# Patient Record
Sex: FEMALE | Race: WHITE | NOT HISPANIC OR LATINO | Employment: OTHER | URBAN - METROPOLITAN AREA
[De-identification: names, ages, dates, MRNs, and addresses within clinical notes are randomized per-mention and may not be internally consistent; named-entity substitution may affect disease eponyms.]

---

## 2017-11-29 ENCOUNTER — CONVERSION ENCOUNTER (OUTPATIENT)
Dept: MAMMOGRAPHY | Facility: HOSPITAL | Age: 77
End: 2017-11-29

## 2018-01-24 ENCOUNTER — OFFICE (OUTPATIENT)
Dept: URBAN - METROPOLITAN AREA CLINIC 66 | Facility: CLINIC | Age: 78
End: 2018-01-24
Payer: COMMERCIAL

## 2018-01-24 VITALS
WEIGHT: 5 LBS | DIASTOLIC BLOOD PRESSURE: 77 MMHG | HEIGHT: 72 IN | SYSTOLIC BLOOD PRESSURE: 117 MMHG | HEART RATE: 74 BPM | TEMPERATURE: 97.7 F

## 2018-01-24 DIAGNOSIS — K59.00 CONSTIPATION, UNSPECIFIED: ICD-10-CM

## 2018-01-24 DIAGNOSIS — R11.0 NAUSEA: ICD-10-CM

## 2018-01-24 PROCEDURE — 99214 OFFICE O/P EST MOD 30 MIN: CPT

## 2018-01-24 RX ORDER — ONDANSETRON 4 MG/1
12 TABLET, ORALLY DISINTEGRATING ORAL
Qty: 20 | Refills: 0 | Status: ACTIVE
Start: 2018-01-24

## 2018-01-29 ENCOUNTER — OFFICE (OUTPATIENT)
Dept: URBAN - METROPOLITAN AREA PATHOLOGY 4 | Facility: PATHOLOGY | Age: 78
End: 2018-01-29
Payer: COMMERCIAL

## 2018-01-29 ENCOUNTER — AMBULATORY SURGICAL CENTER (OUTPATIENT)
Dept: URBAN - METROPOLITAN AREA SURGERY 20 | Facility: SURGERY | Age: 78
End: 2018-01-29
Payer: COMMERCIAL

## 2018-01-29 DIAGNOSIS — R19.7 DIARRHEA, UNSPECIFIED: ICD-10-CM

## 2018-01-29 DIAGNOSIS — K64.1 SECOND DEGREE HEMORRHOIDS: ICD-10-CM

## 2018-01-29 DIAGNOSIS — D12.0 BENIGN NEOPLASM OF CECUM: ICD-10-CM

## 2018-01-29 DIAGNOSIS — K63.89 OTHER SPECIFIED DISEASES OF INTESTINE: ICD-10-CM

## 2018-01-29 PROCEDURE — 88305 TISSUE EXAM BY PATHOLOGIST: CPT

## 2018-01-29 PROCEDURE — 45380 COLONOSCOPY AND BIOPSY: CPT

## 2019-03-18 ENCOUNTER — HOSPITAL ENCOUNTER (OUTPATIENT)
Dept: MAMMOGRAPHY | Facility: HOSPITAL | Age: 79
Discharge: HOME | End: 2019-03-18
Attending: OBSTETRICS & GYNECOLOGY

## 2019-03-26 ENCOUNTER — HOSPITAL ENCOUNTER (OUTPATIENT)
Dept: ULTRASOUND IMAGING | Facility: HOSPITAL | Age: 79
Discharge: HOME | End: 2019-03-26
Attending: OBSTETRICS & GYNECOLOGY

## 2019-04-30 ENCOUNTER — HOSPITAL ENCOUNTER (OUTPATIENT)
Dept: NUCLEAR MEDICINE | Facility: HOSPITAL | Age: 79
Discharge: HOME | End: 2019-04-30

## 2020-06-10 ENCOUNTER — OFFICE VISIT CONVERTED (OUTPATIENT)
Dept: OTOLARYNGOLOGY | Facility: CLINIC | Age: 80
End: 2020-06-10
Attending: OTOLARYNGOLOGY

## 2020-06-10 ENCOUNTER — CONVERSION ENCOUNTER (OUTPATIENT)
Dept: OTOLARYNGOLOGY | Facility: CLINIC | Age: 80
End: 2020-06-10

## 2020-07-18 ENCOUNTER — HOSPITAL ENCOUNTER (OUTPATIENT)
Dept: MAMMOGRAPHY | Facility: HOSPITAL | Age: 80
Discharge: HOME | End: 2020-07-18
Attending: OBSTETRICS & GYNECOLOGY

## 2020-07-23 ENCOUNTER — HOSPITAL ENCOUNTER (OUTPATIENT)
Dept: MAMMOGRAPHY | Facility: HOSPITAL | Age: 80
Discharge: HOME | End: 2020-07-23
Attending: OBSTETRICS & GYNECOLOGY

## 2021-03-15 ENCOUNTER — OFFICE VISIT CONVERTED (OUTPATIENT)
Dept: PULMONOLOGY | Facility: CLINIC | Age: 81
End: 2021-03-15
Attending: INTERNAL MEDICINE

## 2021-03-17 ENCOUNTER — HOSPITAL ENCOUNTER (OUTPATIENT)
Dept: SLEEP MEDICINE | Facility: HOSPITAL | Age: 81
Discharge: HOME | End: 2021-03-17
Attending: PSYCHIATRY & NEUROLOGY

## 2021-03-29 ENCOUNTER — HOSPITAL ENCOUNTER (OUTPATIENT)
Dept: PET IMAGING | Facility: HOSPITAL | Age: 81
Discharge: HOME | End: 2021-03-29
Attending: INTERNAL MEDICINE

## 2021-04-05 ENCOUNTER — HOSPITAL ENCOUNTER (OUTPATIENT)
Dept: SLEEP MEDICINE | Facility: HOSPITAL | Age: 81
Discharge: HOME | End: 2021-04-05
Attending: PSYCHIATRY & NEUROLOGY

## 2021-05-10 NOTE — H&P
"   History and Physical      Patient Name: Kathryn More   Patient ID: [id number]   Sex: Female   YOB: 1940    Primary Care Provider: Ren Calhoun MD   Referring Provider: Ren Calhoun MD    Visit Date: Shirley 10, 2020    Provider: Victor Manuel Cisse MD   Location: [facility]   Location Address: [address] [place] [address], [address]  [id number]   Location Phone: [phone]          Chief Complaint     \"Dr. Weinberg retired.\"       History Of Present Illness  Kathryn More is a 80 year old /White female with past medical history significant for hyperparathyroidism, hypertension, hyperlipidemia, and GERD who presents to the office today as a consult from Ren Calhoun MD for evaluation of hyperparathyroidism. She tells me that she has been seeing an endocrinologist, Dr. Weinberg, for a number of years. She tells me that her calcium was initially noted to be elevated on routine laboratory evaluation. She has been getting this followed since. Her most recent testing occurred on 1/17/2020 and revealed an intact PTH of 80 with a calcium of 10.6 and a creatinine of 0.92. She has previously undergone 24-hour urine calcium on 1/1/2017 which was 296. She denies any prior issues with her vitamin D level. She is not taking any thiazide diuretics. She denies any issues with hip or vertebral fractures. She does report a history of kidney stones. She denies any issues with fatigue, brain fog, or constipation. She does report a history of thyroid nodules but denies any family history of thyroid cancer or personal history of radiation exposure. She was last seen by Dr. Farley on 12/17/2019 at which time they continue to pursue observation. The record shows that she underwent a DEXA scan in February 2019 which demonstrated worsening osteopenia. I do not have those results available to me today.       Past Medical History  Cataract; Chest Pain; Coronary artery disease; Edema; GERD; High " cholesterol; Hypertension; Kidney stone; Macular degeneration; Parathyroid abnormality; Skin cancer; Sleep apnea; Vertigo         Past Surgical History  Breast biopsy, both breasts; Cholecystecomy; Cryosurgery of lesion; Dilatation and curettage; Skin Cancer Excision         Medication List  Aspirin Low Dose 81 mg oral tablet,delayed release (DR/EC); Crestor 5 mg oral tablet; Fioricet -40 mg oral capsule; magnesium oxide 400 mg magnesium oral tablet; meclizine 25 mg oral tablet; nitroglycerin 50 mg/10 mL (5 mg/mL) intravenous solution; omeprazole 20 mg oral capsule,delayed release(DR/EC); PreserVision AREDS 14,320-226-200 unit-mg-unit oral capsule; Probiotic 15 billion cell oral capsule; sertraline 50 mg oral tablet; spironolactone 25 mg oral tablet; tramadol 50 mg oral tablet; valsartan 320 mg oral tablet         Allergy List  Biaxin; Doxazosin-induced syncope; epinephrine; Lidocaine         Family Medical History  Family history of skin cancer; Family history of brain cancer; Family history of stroke; Family history of heart disease; Family history of throat cancer         Social History  Tobacco (Never)         Review of Systems  · Constitutional  o Denies  o : fever, night sweats, weight loss  · Eyes  o Admits  o : discharge from eye, impaired vision  · HENT  o Admits  o : *See HPI  · Cardiovascular  o Denies  o : chest pain, irregular heart beats  · Respiratory  o Admits  o : shortness of breath  o Denies  o : wheezing, coughing up blood  · Gastrointestinal  o Admits  o : reflux  o Denies  o : heartburn, vomiting blood  · Genitourinary  o Denies  o : frequency  · Integument  o Admits  o : skin dryness  o Denies  o : rash  · Neurologic  o Admits  o : loss of balance  o Denies  o : seizures, loss of consciousness, dizziness  · Endocrine  o Denies  o : cold intolerance, heat intolerance  · Heme-Lymph  o Denies  o : easy bleeding, anemia      Vitals  Date Time BP Position Site L\R Cuff Size HR RR TEMP (F) WT   "HT  BMI kg/m2 BSA m2 O2 Sat        06/10/2020 10:02 AM        97.5 166lbs 6oz 5'  2\" 30.43 1.82           Physical Examination  · Constitutional  o Appearance  o : well developed, well-nourished, alert and in no acute distress, voice clear and strong  · Head and Face  o Head  o :   § Inspection  § : no deformities or lesions  o Face  o :   § Inspection  § : No facial lesions; House-Brackmann I/VI bilaterally  § Palpation  § : No TMJ crepitus nor  muscle tenderness bilaterally  · Eyes  o Vision  o :   § Visual Fields  § : Extraocular movements are intact. No spontaneous or gaze-induced nystagmus.  o Conjunctivae  o : clear  o Sclerae  o : clear  o Pupils and Irises  o : pupils equal, round, and reactive to light.   · Ears, Nose, Mouth and Throat  o Ears  o :   § External Ears  § : appearance within normal limits, no lesions present  § Otoscopic Examination  § : tympanic membrane appearance within normal limits bilaterally without perforations, well-aerated middle ears  § Hearing  § : intact to conversational voice both ears  o Nose  o :   § External Nose  § : appearance normal  § Intranasal Exam  § : mucosa within normal limits, vestibules normal, no intranasal lesions present, septum midline, sinuses non tender to percussion  o Oral Cavity  o :   § Oral Mucosa  § : oral mucosa normal without pallor or cyanosis  § Lips  § : lip appearance normal  § Teeth  § : normal dentition for age  § Gums  § : gums pink, non-swollen, no bleeding present  § Tongue  § : tongue appearance normal; normal mobility  § Palate  § : hard palate normal, soft palate appearance normal with symmetric mobility  o Throat  o :   § Oropharynx  § : no inflammation or lesions present, tonsils within normal limits  § Hypopharynx  § : Deferred secondary to gag reflex  § Larynx  § : Deferred secondary to gag reflex  · Neck  o Inspection/Palpation  o : normal appearance, no masses or tenderness, trachea midline; thyroid size normal, " nontender, no nodules or masses present on palpation  · Respiratory  o Respiratory Effort  o : breathing unlabored  o Inspection of Chest  o : normal appearance, no retractions  · Cardiovascular  o Heart  o : regular rate and rhythm  · Lymphatic  o Neck  o : no lymphadenopathy present  o Supraclavicular Nodes  o : no lymphadenopathy present  o Preauricular Nodes  o : no lymphadenopathy present  · Skin and Subcutaneous Tissue  o General Inspection  o : Regarding face and neck - there are no rashes present, no lesions present, and no areas of discoloration  · Neurologic  o Cranial Nerves  o : cranial nerves II-XII are grossly intact bilaterally  o Gait and Station  o : normal gait, able to stand without diffculty  · Psychiatric  o Judgement and Insight  o : judgment and insight intact  o Mood and Affect  o : mood normal, affect appropriate              Assessment  · Hypercalcemia     275.42/E83.52  · Hyperparathyroidism     252.00/E21.3    Problems Reconciled  Plan  · Orders  o PTH intact + calcium (12507, 22241) - 275.42/E83.52, 252.00/E21.3 - 01/19/2021  · Medications  o Medications have been Reconciled  o Transition of Care or Provider Policy  · Instructions  o Impressions and findings were discussed with Mrs. More at great length. Currently, she is seen for evaluation of hyperparathyroidism and hypercalcemia. We reviewed her most recent laboratory evaluation from 1/17/2020 which revealed an elevated intact PTH at 80 and calcium at 10.6. Her creatinine was normal at 0.92. We discussed that this is likely consistent with primary hyperparathyroidism. Currently, she is asymptomatic aside from rare kidney stones although her most recent DEXA in February 2019 demonstrated worsening of her osteopenia. We discussed the pathophysiology and natural history of this condition. Options for management were discussed including continued observation versus parathyroidectomy. At this time, we will continue with observation and  will therefore see her back in 6 months with a repeat PTH and calcium.  · Correspondence  o ENT Letter to Referring MD (Ren Calhoun MD) - 06/19/2020            Electronically Signed by: Victor Manuel Cisse MD -Author on June 19, 2020 08:47:01 AM

## 2021-05-15 VITALS — TEMPERATURE: 97.5 F | WEIGHT: 166.37 LBS | HEIGHT: 62 IN | BODY MASS INDEX: 30.61 KG/M2

## 2021-05-17 ENCOUNTER — OFFICE VISIT (OUTPATIENT)
Dept: ENDOCRINOLOGY | Age: 81
End: 2021-05-17

## 2021-05-17 VITALS
HEIGHT: 64 IN | HEART RATE: 68 BPM | BODY MASS INDEX: 28.89 KG/M2 | WEIGHT: 169.2 LBS | DIASTOLIC BLOOD PRESSURE: 80 MMHG | OXYGEN SATURATION: 97 % | SYSTOLIC BLOOD PRESSURE: 142 MMHG

## 2021-05-17 DIAGNOSIS — E21.3 HYPERPARATHYROIDISM (HCC): Primary | ICD-10-CM

## 2021-05-17 DIAGNOSIS — M85.89 OSTEOPENIA OF MULTIPLE SITES: ICD-10-CM

## 2021-05-17 PROCEDURE — 99204 OFFICE O/P NEW MOD 45 MIN: CPT | Performed by: INTERNAL MEDICINE

## 2021-05-17 NOTE — PROGRESS NOTES
"Chief Complaint  Chief Complaint   Patient presents with   • hyperparathryroidism       Subjective          History of Present Illness    Kathryn More,81 y.o.  is here for elevated Ca levels. Consulted by Dr. Calhoun    Patient used to see Dr. Weinberg in the past, she was diagnosed with calcium and parathyroid abnormality 23 years ago.    Pt ca was noted to be elevated on routine blood work up with ?  Elevated PTH levels.   No hx of kidney stones, no hx of fragility fractures, does have hx of osteopenia. No family hx of kidney stones and no family hx of osteoporosis.   Not on Ca and Vitamin D supplementation.  Does take multivitamin  No c/o occasional abdominal pain, does c/o bone pains and joint pain, no increased urination and no increased thirst.   Pt is not on HCTZ . Currently on no steroids. Drinks milk twice a week, eats cheese daily and doesn't eat yogurt.   Does not take regular Tums.    No DEXA scan in the system and no 24 hr urine calcium levels.     Thyroid ultrasound-showed multiple tiny subcentimeter nodules.    Reviewed primary care physician's/consulting physician documentation and lab results         I have reviewed the patient's allergies, medicines, past medical hx, family hx and social hx in detail.    Objective   Vital Signs:   /80 (BP Location: Left arm, Patient Position: Sitting, Cuff Size: Large Adult)   Pulse 68   Ht 161.9 cm (63.74\")   Wt 76.7 kg (169 lb 3.2 oz)   SpO2 97%   BMI 29.28 kg/m²   Physical Exam   General appearance - no distress  Eyes- anicteric sclera  Ear nose and throat-external ears and nose normal.    Respiratory-normal chest on inspection.  No respiratory distress noted.  Skin-no rashes.  Neuro-alert and oriented x3          Result Review :   The following data was reviewed by: Herve Srivastava MD on 05/17/2021:  No results found for any previous visit.     Data reviewed: Referral documentation and outside lab reports       Results Review:    I reviewed the " patient's new clinical results.     Assessment and Plan {CC Problem List  Visit Diagnosis  ROS  Review (Popup)  Health Maintenance  Quality  BestPractice  Medications  SmartSets  SnapShot Encounters  Media :23}   Problem List Items Addressed This Visit     None      Visit Diagnoses     Hyperparathyroidism (CMS/[facility])    -  Primary    Relevant Orders    DEXA Bone Density Axial    TSH    T4, Free    Basic Metabolic Panel    PTH, Intact & Calcium    Protein Elec + Interp, Serum    Phosphorus    Vitamin D 25 Hydroxy    Magnesium    Osteopenia of multiple sites        Relevant Orders    DEXA Bone Density Axial    TSH    T4, Free    Basic Metabolic Panel    PTH, Intact & Calcium    Protein Elec + Interp, Serum    Phosphorus    Vitamin D 25 Hydroxy    Magnesium        Primary hyperparathyroidism  Patient is not very comfortable with parathyroid surgery.  She does not even want to consider bone medications given the risk of the side effects.    Proceed with a bone density scan.  Check calcium, parathyroid levels.    Based on these findings would either continue to monitor her levels or might consider oral bisphosphonates to help improve the calcium levels.    Explained to the patient that the surgery is not aggressive but patient at this time does not want to proceed with that plan.    Osteopenia  Proceed with a bone density scan.    Reviewed the ultrasound report of the patient for the thyroid-was noted to have subcentimeter thyroid nodules.    Interpreted the blood work-up/imaging results performed by the primary care/consulting physician -    Refills sent to pharmacy    Follow Up     Patient was given instructions and counseling regarding her condition or for health maintenance advice. Please see specific information pulled into the AVS if appropriate.       Thank you for asking me to see your patient, Kathryn More in consultation.         Herve Srivastava MD  05/17/21      EMR Dragon / transcription  "disclaimer:     \"Dictated utilizing Dragon dictation\".                     "

## 2021-05-20 LAB
25(OH)D3+25(OH)D2 SERPL-MCNC: 42.8 NG/ML (ref 30–100)
ALBUMIN SERPL ELPH-MCNC: 3.7 G/DL (ref 2.9–4.4)
ALBUMIN/GLOB SERPL: 1.2 {RATIO} (ref 0.7–1.7)
ALPHA1 GLOB SERPL ELPH-MCNC: 0.3 G/DL (ref 0–0.4)
ALPHA2 GLOB SERPL ELPH-MCNC: 0.9 G/DL (ref 0.4–1)
B-GLOBULIN SERPL ELPH-MCNC: 1.2 G/DL (ref 0.7–1.3)
BUN SERPL-MCNC: 20 MG/DL (ref 8–23)
BUN/CREAT SERPL: 20.8 (ref 7–25)
CALCIUM SERPL-MCNC: 11.1 MG/DL (ref 8.6–10.5)
CHLORIDE SERPL-SCNC: 105 MMOL/L (ref 98–107)
CO2 SERPL-SCNC: 24.8 MMOL/L (ref 22–29)
CREAT SERPL-MCNC: 0.96 MG/DL (ref 0.57–1)
GAMMA GLOB SERPL ELPH-MCNC: 0.6 G/DL (ref 0.4–1.8)
GLOBULIN SER CALC-MCNC: 3 G/DL (ref 2.2–3.9)
GLUCOSE SERPL-MCNC: 91 MG/DL (ref 65–99)
INTACT PTH: ABNORMAL
LABORATORY COMMENT REPORT: NORMAL
M PROTEIN SERPL ELPH-MCNC: NORMAL G/DL
MAGNESIUM SERPL-MCNC: 2.1 MG/DL (ref 1.6–2.4)
PHOSPHATE SERPL-MCNC: 3.5 MG/DL (ref 2.5–4.5)
POTASSIUM SERPL-SCNC: 5.1 MMOL/L (ref 3.5–5.2)
PROT PATTERN SERPL ELPH-IMP: NORMAL
PROT SERPL-MCNC: 6.7 G/DL (ref 6–8.5)
PTH-INTACT SERPL-MCNC: 78 PG/ML (ref 15–65)
SODIUM SERPL-SCNC: 141 MMOL/L (ref 136–145)
T4 FREE SERPL-MCNC: 1.03 NG/DL (ref 0.93–1.7)
TSH SERPL DL<=0.005 MIU/L-ACNC: 1.73 UIU/ML (ref 0.27–4.2)

## 2021-05-21 ENCOUNTER — TELEPHONE (OUTPATIENT)
Dept: ENDOCRINOLOGY | Age: 81
End: 2021-05-21

## 2021-05-21 DIAGNOSIS — E21.3 HYPERPARATHYROIDISM (HCC): Primary | ICD-10-CM

## 2021-05-22 ENCOUNTER — TRANSCRIBE ORDERS (OUTPATIENT)
Dept: ADMINISTRATIVE | Facility: HOSPITAL | Age: 81
End: 2021-05-22

## 2021-05-22 DIAGNOSIS — R91.1 PULMONARY NODULE: Primary | ICD-10-CM

## 2021-05-28 VITALS
WEIGHT: 166.5 LBS | DIASTOLIC BLOOD PRESSURE: 68 MMHG | SYSTOLIC BLOOD PRESSURE: 145 MMHG | BODY MASS INDEX: 29.5 KG/M2 | HEART RATE: 71 BPM | OXYGEN SATURATION: 97 % | TEMPERATURE: 97 F | RESPIRATION RATE: 16 BRPM | HEIGHT: 63 IN

## 2021-05-28 NOTE — PROGRESS NOTES
Patient: MIRLANDE ECHOLS     Acct: [id number]     Report: #[id number]  UNIT #: [id number]     : 1940    Encounter Date:03/15/2021  PRIMARY CARE: BRIANA GAINES  ***Signed***  --------------------------------------------------------------------------------------------------------------------  Chief Complaint      Encounter Date      Mar 15, 2021            Primary Care Provider      BRIANA GAINES            Referring Provider      BRIANA GAINES            Patient Complaint      Patient is complaining of      New Pt here for 1cm RLL. Hypertension            VITALS      Height 5 ft 3 in / 160.02 cm      Weight 166 lbs 8 oz / 75.799065 kg      BSA 1.79 m2      BMI 29.5 kg/m2      Temperature 97.0 F / 36.11 C - Tympanic      Pulse 71      Respirations 16      Blood Pressure 145/68 Sitting, Left Arm      Pulse Oximetry 97%, room air      Initial Exhaled Nitrous Oxide      Date:  Mar 15, 2021      Exhaled Nitrous Oxide Results:  17            HPI      The patient is a pleasant 80 year old female here today to be evaluated for an     abnormal CT scan of the chest.  The patient had been having some cough now which    has been present over the course of the past several months. The patient     subsequently underwent a chest CT scan to further assess the cough. CT scan was     performed on 2021. CT scan showed the presence of a 1 cm spiculated lesion    in the right lower lobe recommending a PET scan for follow up.  The patient's     course is worse at night. It is associated with acid reflux and is not     associated with any sputum production.  No other aggravating factors or     relieving factors, severe at times, very sparsely present during the day. No     chest pains, heart palpitations or lower extremity edema. Denies any swollen or     enlarged lymph nodes and no weight loss. She denies having any hemoptysis.  The     patient has no significant smoking history.            ROS       Constitutional:  Complains of: Fatigue; Denies: Fever, Weight gain, Weight loss,    Chills, Insomnia, Other      Respiratory/Breathing:  Complains of: Shortness of air, Wheezing, Cough; Denies:    Hemoptysis, Pleuritic pain, Other      Endocrine:  Denies: Polydipsia, Polyuria, Heat/cold intolerance, Abnorml me    nstrual pattern, Diabetes, Other      Eyes:  Denies: Blurred vision, Vision Changes, Other      Ears, nose, mouth, throat:  Complains of: Headaches, Dry Mouth, Nasal discharge,    Nasal congestion; Denies: Mouth lesions, Thrush, Throat pain, Hoarseness,     Allergies/Hay Fever, Post Nasal Drip, Recent Head Injury, Nose Bleeding, Neck     Stiffness, Thyroid Mass, Hearing Loss, Ear Fullness, Nasal or Sinus Pain, Dry     Lips, Other      Cardiovascular:  Complains of: Chest Pain, Dyspnea on Exertion, Other (Coronary     heart disease); Denies: Palpitations, Syncope, Claudication, Wake up Gasping for    air, Leg Swelling, Irregular Heart Rate, Cyanosis      Gastrointestinal:  Complains of: Reflux/Heartburn; Denies: Nausea, Constipation,    Diarrhea, Abdominal pain, Vomiting, Difficulty Swallowing, Dysphagia, Jaundice,     Bloating, Melena, Bloody stools, Other      Genitourinary:  Denies: Urinary frequency, Incontinence, Hematuria, Urgency,     Nocturia, Dysuria, Testicular problems, Other      Musculoskeletal:  Complains of: Joint Pain, Joint Stiffness; Denies: Joint     Swelling, Myalgias, Other      Hematologic/lymphatic:  DENIES: Lymphadenopathy, Bruising, Bleeding tendencies,     Other      Neurological:  Complains of: Weakness; Denies: Headache, Numbness, Seizures,     Other      Psychiatric:  Denies: Anxiety, Appropriate Effect, Depression, Other      Sleep:  Yes: Other (Sleep apnea); No: Excessive daytime sleep, Morning     Headache?, Snoring, Insomnia?, Stop breathing at sleep?      Integumentary:  Complains of: Dry skin; Denies: Rash, Skin Warm to Touch, Other      Immunologic/Allergic:  Denies:  "Latex allergy, Seasonal allergies, Asthma,     Urticaria, Eczema, Other      Immunization status:  Up to date            FAMILY/SOCIAL/MEDICAL HX      Surgical History:  Yes: Breast Surgery (Jonatan braest biopsy), Cholecystectomy, Eye    Surgery (Bilateral Cataract), Other Surgeries (DNC x 2,,, skin cancer removed     off face)      Stroke - Family Hx:  Mother      Heart - Family Hx:  Father      Cancer/Type - Family Hx:  Mother (skin cancer), Father (throat cancer), Brother     (Brain cancer)      Is Father Still Living?:  No      Is Mother Still Living?:  No      Social History:  No Tobacco Use, No Alcohol Use, No Recreational Drug use      Smoking status:  Never smoker       Section:  No      Tubal Ligation:  No      Hysterectomy:  No      Anticoagulation Therapy:  No      Antibiotic Prophylaxis:  Yes      Medical History:  Yes: Chemotherapy/Cancer (SKIN CANCER REMOVED),     Hemorrhoids/Rectal Prob (REFLUX), High Blood Pressure (CONTROLLED WITH MEDS ),     High Cholesterol, Reflux Disease, Shortness Of Breath, Thyroid Problem     (hyperparathyroidism); No: Blood Disease, Deafness or Ringing Ears,     Miscellaneous Medical/oth      Psychiatric History      None            PREVENTION      Hx Influenza Vaccination:  Yes      Date Influenza Vaccine Given:  2020      Influenza Vaccine Declined:  No      2 or More Falls in Past Year?:  No      Fall Past Year with Injury?:  Yes (right shoulder)      Hx Pneumococcal Vaccination:  Yes      Encouraged to follow-up with:  PCP regarding preventative exams.      Chart initiated by      Theresa Prado MA            ALLERGIES/MEDICATIONS      Allergies:        Coded Allergies:             EPINEPHRINE (Verified  Allergy, Unknown, \"MAKES MY HEART FEEL FUNNY,     SENSATION OF PASSING OUT\", 3/15/21)           CLARITHROMYCIN (Verified  Adverse Reaction, Unknown, UPSET STOMACH,     3/15/21)      Medications      Gluc Bautista/Chondro Bautista A/Vit C/Mn (Osteo Biflex) 1 Each Tablet    "   1 TAB PO QDAY, #30 TAB         Reported         3/15/21       Ubidecarenone (Co Q-10) 1 Each Capsule      100 MG PO QDAY, CAP         Reported         3/15/21       Lactobac Cmb #3/Fos/Pantethine (Probiotic   1 EACH PO, CAP         Reported         3/15/21       (eylea)   No Conflict Check               Reported         3/15/21       Nitroglycerin (NITROSTAT) 0.4 Mg Tablet      0.4 MG SL ASDIR, #25 TAB.SL         Reported         3/15/21       traMADol HCl (traMADol HCl) 50 Mg Tablet      50 MG PO Q4H PRN for PAIN, TAB 0 Refills         Reported         3/15/21       Meclizine Hcl (Meclizine*) 25 Mg Tablet      25 MG PO prn, #60 TAB 0 Refills         Reported         3/15/21       (persvision areds)   No Conflict Check               Reported         3/15/21       Ofloxacin (Ofloxacin Op Drops) 5 Ml Drops      1 DROPS EYE LT ASDIR, BOTTLE         Reported         11/4/16       prednisoLONE Acetate (Pred-Forte 1% Ophth) 10 Ml Drops.susp      1 DROPS EYE EACH ASDIR, #1 BOTTLE 0 Refills         Reported         11/4/16       Magnesium Oxide (Magnesium Oxide*) 400 Mg Tablet      400 MG PO BID, #60 TAB 0 Refills         Reported         11/4/16       Multivitamins (Multi-Vitamin) 1 Each Tablet      1 TAB PO QDAY, #30 TAB 0 Refills         Reported         11/4/16       Omeprazole (priLOSEC) 20 Mg Capsule.dr      20 MG PO Q2D, #60 CAP 0 Refills         Reported         11/4/16       Sertraline HCl (Sertraline*) 50 Mg Tablet      50 MG PO HS, #30 TAB 0 Refills         Reported         11/4/16       Rosuvastatin Calcium (Crestor*) 5 Mg Tablet      5 MG PO Q2D, #30 TAB 0 Refills         Reported         11/4/16       Spironolactone (Spironolactone*) 25 Mg Tablet      25 MG PO HS, #60 TAB 0 Refills         Reported         11/4/16       Aspirin Chew (Aspirin Chew) 81 Mg Tab.chew      81 MG PO QDAY, #30 TAB.CHEW 0 Refills         Reported         11/4/16       Valsartan (Diovan) 160 Mg Tablet      240 MG PO HS, TAB          Reported         11/4/16       Butalbital/APAP/Caffeine 50/325/40 MG (Fioricet) 1 Each Capsule      1 EACH PO Q4-6H PRN for HEADACHE, TAB 0 Refills         Reported         11/4/16      Current Medications      Current Medications Reviewed 3/15/21            EXAM      Vital Signs Reviewed.      General:  WDWN, Alert, NAD.      HEENT: PERRL, EOMI.  OP, nares clear, no sinus tenderness.      Neck: Supple, no JVD, no thyromegaly.      Lymph: No axillary, cervical, supraclavicular lymphadenopathy noted bilaterally.      Chest: Good aeration, clear to auscultation bilaterally, tympanic to percussion     bilaterally, no work of breathing noted.      CV: RRR, no MGR, pulses 2+, equal.        Abd: Soft, NT, ND, +BS, no HSM.      EXT: No clubbing, no cyanosis, no edema, no joint tenderness.        Neuro:  A  Skin: No rashes or lesions.      Vtials      Vitals:             Height 5 ft 3 in / 160.02 cm           Weight 166 lbs 8 oz / 75.321032 kg           BSA 1.79 m2           BMI 29.5 kg/m2           Temperature 97.0 F / 36.11 C - Tympanic           Pulse 71           Respirations 16           Blood Pressure 145/68 Sitting, Left Arm           Pulse Oximetry 97%, room air            REVIEW      Results Reviewed      PCCS Results Reviewed?:  Yes Prev Lab Results, Yes Prev Radiology Results, Yes     Previous [facility] Records            Assessment      Solitary pulmonary nodule - R91.1            Notes      New Medications      * (persvision areds):       * Meclizine Hcl (Meclizine*) 25 MG TABLET: 25 MG PO prn #60      * traMADol HCl 50 MG TABLET: 50 MG PO Q4H PRN PAIN      * NITROGLYCERIN (NITROSTAT) 0.4 MG TABLET: 0.4 MG SL ASDIR #25         Instructions: Take 1 tab      to 3 tabs in a 15-min period.      * (eylea):          Instructions: Injection into right eye every 2 months      * LACTOBAC CMB #3/FOS/PANTETHINE (Probiotic      EACH PO      * Ubidecarenone (Co Q-10) 1 EACH CAPSULE: 100 MG PO QDAY      * Gluc Bautista/Chondro Bautista  A/Vit C/Mn (Osteo Biflex) 1 EACH TABLET: 1 TAB PO QDAY #30      Changed Medications      * Valsartan (Diovan) 160 MG TABLET: 240 MG PO HS         Instructions: OTC      New Diagnostics      * PET [facility] IN [facility], SCHEDULED PROCEDURE         Dx: Solitary pulmonary nodule - R91.1      * PFT-Comp, PrePost,DLCO,BodyBox         Dx: Solitary pulmonary nodule - R91.1      ASSESSMENT:       1.  Right lower lobe pulmonary nodule.      2. Chronic cough.      3. Untreated sleep apnea.            PLAN:      1. Suspect that this nodule is scarring in the right lower lobe.  We will obtain     PET scan given the worrisome morphology.      2. Discussed with the patient that I believe her cough at this time to b     secondary to uncontrolled reflux which is worse at night because her sleep apnea     is untreated. FENO obtained today in the office was not suggestive of asthma.      3. Recommend continuing PPI.  The patient has a follow up appointment with a     sleep specialist.      4. No need for bronchodilator therapies at this time.      5. I have personally reviewed laboratory data, imaging as well as previous     medical records.       6. We will see patient back in the office after PET scan to discuss results. We     will also obtain pulmonary function tests if the PET scan is abnormal.            Patient Education      Education resources provided:  Yes      Patient Education Provided:  Sleep Apnea            Electronically signed by Braxton Church  03/24/2021 14:16       Disclaimer: Converted document may not contain table formatting or lab diagrams. Please see [facility] for the authenticated document.

## 2021-06-01 ENCOUNTER — TELEPHONE (OUTPATIENT)
Dept: ENDOCRINOLOGY | Age: 81
End: 2021-06-01

## 2021-06-01 NOTE — TELEPHONE ENCOUNTER
Patient left voice mail  She is suppose to have a parathyroid scan done  But has not heard anything  Please call patient to let her know what going on with scheduling

## 2021-06-04 ENCOUNTER — TELEPHONE (OUTPATIENT)
Dept: ENDOCRINOLOGY | Age: 81
End: 2021-06-04

## 2021-06-04 NOTE — TELEPHONE ENCOUNTER
Patient stated that she still has not heard anything about her scheduling  Her parathyroid scan at [facility]

## 2021-06-22 ENCOUNTER — TELEPHONE (OUTPATIENT)
Dept: ENDOCRINOLOGY | Age: 81
End: 2021-06-22

## 2021-06-22 NOTE — TELEPHONE ENCOUNTER
Patient stated that she never got  A call from [facility] to scheduled her parathyroid scan please call patient

## 2021-06-24 DIAGNOSIS — E21.3 HYPERPARATHYROIDISM (HCC): Primary | ICD-10-CM

## 2021-06-25 ENCOUNTER — TELEPHONE (OUTPATIENT)
Dept: ENDOCRINOLOGY | Age: 81
End: 2021-06-25

## 2021-06-25 NOTE — TELEPHONE ENCOUNTER
Called and spoke with patient let patient know that she has a appt schedule for a parathyroid scan on July 6 th at 8:00 she had s to be at [facility] at 7:30 am   Patient voice understanding  And was given the number to scheduling if she needed to change the date  [phone]

## 2021-07-02 ENCOUNTER — TELEPHONE (OUTPATIENT)
Dept: ENDOCRINOLOGY | Age: 81
End: 2021-07-02

## 2021-07-02 NOTE — TELEPHONE ENCOUNTER
[facility]    refaxed  Order to  The number [phone]

## 2021-07-06 ENCOUNTER — TELEPHONE (OUTPATIENT)
Dept: ENDOCRINOLOGY | Age: 81
End: 2021-07-06

## 2021-07-06 NOTE — TELEPHONE ENCOUNTER
Patient left voice mail stating that  She had to cancel her appt for her parathyroid scan for [facility]  Because they did not get the order that was faxed on 7/02/21 to the phone number  [phone]      Spoke with [facility] scheduling   Patient has appt at [facility] on 7/14/21 at 7:00 am checked to make sure that they has order she did have them in the system    Scheduling gave me gave me 2 number to fax the order to   [phone] and [phone] Patient notified

## 2021-07-20 ENCOUNTER — TELEPHONE (OUTPATIENT)
Dept: ENDOCRINOLOGY | Age: 81
End: 2021-07-20

## 2021-07-20 NOTE — TELEPHONE ENCOUNTER
SPOKE WITH PATIENT IN REGARDS TO HER PARATHYROID SCAN SHE HAD DONE AT [facility]   SHE VOICE UNDERSTANDING OF THE RESULTS

## 2021-07-21 ENCOUNTER — TELEPHONE (OUTPATIENT)
Dept: ENDOCRINOLOGY | Age: 81
End: 2021-07-21

## 2021-07-21 DIAGNOSIS — E21.3 HYPERPARATHYROIDISM (HCC): Primary | ICD-10-CM

## 2021-07-21 NOTE — TELEPHONE ENCOUNTER
Patient called and stated that she had a parathyroid scan on 7/17/21 and wanted call about the results

## 2021-07-21 NOTE — TELEPHONE ENCOUNTER
Spoke with Patient on 7/20/21  in regards to patient parathyroid scan from [facility]

## 2021-07-23 ENCOUNTER — TRANSCRIBE ORDERS (OUTPATIENT)
Dept: ADMINISTRATIVE | Facility: HOSPITAL | Age: 81
End: 2021-07-23

## 2021-07-23 DIAGNOSIS — Z12.31 ENCOUNTER FOR SCREENING MAMMOGRAM FOR MALIGNANT NEOPLASM OF BREAST: Primary | ICD-10-CM

## 2021-07-29 DIAGNOSIS — E21.3 HYPERPARATHYROIDISM (HCC): Primary | ICD-10-CM

## 2021-08-11 ENCOUNTER — HOSPITAL ENCOUNTER (OUTPATIENT)
Dept: MAMMOGRAPHY | Facility: HOSPITAL | Age: 81
Discharge: HOME | End: 2021-08-11
Admitting: NURSE PRACTITIONER

## 2021-08-11 DIAGNOSIS — Z12.31 ENCOUNTER FOR SCREENING MAMMOGRAM FOR MALIGNANT NEOPLASM OF BREAST: ICD-10-CM

## 2021-08-11 PROCEDURE — 77063 BREAST TOMOSYNTHESIS BI: CPT

## 2021-08-11 PROCEDURE — 77067 SCR MAMMO BI INCL CAD: CPT

## 2021-08-11 PROCEDURE — 77067 SCR MAMMO BI INCL CAD: CPT | Performed by: RADIOLOGY

## 2021-08-11 PROCEDURE — 77063 BREAST TOMOSYNTHESIS BI: CPT | Performed by: RADIOLOGY

## 2021-09-23 ENCOUNTER — PREP FOR SURGERY (OUTPATIENT)
Dept: OTHER | Facility: HOSPITAL | Age: 81
End: 2021-09-23

## 2021-09-23 DIAGNOSIS — E04.2 MULTIPLE THYROID NODULES: ICD-10-CM

## 2021-09-23 DIAGNOSIS — D68.9 COAGULATION DEFECT, UNSPECIFIED (HCC): ICD-10-CM

## 2021-09-23 DIAGNOSIS — E83.52 HYPERCALCEMIA: ICD-10-CM

## 2021-09-23 DIAGNOSIS — Z01.818 PREOP TESTING: Primary | ICD-10-CM

## 2021-09-23 DIAGNOSIS — D35.1 PARATHYROID ADENOMA: ICD-10-CM

## 2021-09-23 DIAGNOSIS — D68.8 OTHER SPECIFIED COAGULATION DEFECTS (HCC): ICD-10-CM

## 2021-09-23 DIAGNOSIS — E21.0 PRIMARY HYPERPARATHYROIDISM (HCC): Primary | ICD-10-CM

## 2021-09-30 ENCOUNTER — HOSPITAL ENCOUNTER (OUTPATIENT)
Dept: CT IMAGING | Facility: HOSPITAL | Age: 81
Discharge: HOME | End: 2021-09-30
Admitting: INTERNAL MEDICINE

## 2021-09-30 DIAGNOSIS — R91.1 PULMONARY NODULE: ICD-10-CM

## 2021-09-30 PROBLEM — E21.0 PRIMARY HYPERPARATHYROIDISM: Status: ACTIVE | Noted: 2021-09-30

## 2021-09-30 PROBLEM — E04.2 MULTIPLE THYROID NODULES: Status: ACTIVE | Noted: 2021-09-30

## 2021-09-30 PROBLEM — D35.1 PARATHYROID ADENOMA: Status: ACTIVE | Noted: 2021-09-30

## 2021-09-30 PROBLEM — E83.52 HYPERCALCEMIA: Status: ACTIVE | Noted: 2021-09-30

## 2021-09-30 PROCEDURE — 71250 CT THORAX DX C-: CPT

## 2021-10-04 ENCOUNTER — OFFICE VISIT (OUTPATIENT)
Dept: PULMONOLOGY | Facility: CLINIC | Age: 81
End: 2021-10-04

## 2021-10-04 VITALS
RESPIRATION RATE: 14 BRPM | SYSTOLIC BLOOD PRESSURE: 127 MMHG | DIASTOLIC BLOOD PRESSURE: 64 MMHG | HEART RATE: 79 BPM | HEIGHT: 62 IN | WEIGHT: 168 LBS | TEMPERATURE: 97.7 F | BODY MASS INDEX: 30.91 KG/M2

## 2021-10-04 DIAGNOSIS — R91.1 LUNG NODULE: Primary | ICD-10-CM

## 2021-10-04 DIAGNOSIS — R05.3 CHRONIC COUGH: ICD-10-CM

## 2021-10-04 DIAGNOSIS — G47.30 SLEEP APNEA, UNSPECIFIED TYPE: ICD-10-CM

## 2021-10-04 DIAGNOSIS — R93.89 ABNORMAL CHEST CT: ICD-10-CM

## 2021-10-04 PROBLEM — F41.9 ANXIETY: Status: ACTIVE | Noted: 2021-08-18

## 2021-10-04 PROBLEM — I10 ESSENTIAL HYPERTENSION: Status: ACTIVE | Noted: 2021-06-29

## 2021-10-04 PROBLEM — K21.9 GASTROESOPHAGEAL REFLUX DISEASE: Status: ACTIVE | Noted: 2021-10-04

## 2021-10-04 PROCEDURE — 99214 OFFICE O/P EST MOD 30 MIN: CPT | Performed by: NURSE PRACTITIONER

## 2021-10-04 RX ORDER — DOXYCYCLINE HYCLATE 100 MG/1
100 CAPSULE ORAL 2 TIMES DAILY
Qty: 20 CAPSULE | Refills: 0 | Status: SHIPPED | OUTPATIENT
Start: 2021-10-04 | End: 2021-10-14

## 2021-10-07 ENCOUNTER — PRE-ADMISSION TESTING (OUTPATIENT)
Dept: PREADMISSION TESTING | Facility: HOSPITAL | Age: 81
End: 2021-10-07

## 2021-10-07 ENCOUNTER — HOSPITAL ENCOUNTER (OUTPATIENT)
Dept: GENERAL RADIOLOGY | Facility: HOSPITAL | Age: 81
Discharge: HOME | End: 2021-10-07

## 2021-10-07 VITALS
HEART RATE: 68 BPM | DIASTOLIC BLOOD PRESSURE: 76 MMHG | BODY MASS INDEX: 31.68 KG/M2 | OXYGEN SATURATION: 96 % | WEIGHT: 172.18 LBS | HEIGHT: 62 IN | TEMPERATURE: 98 F | SYSTOLIC BLOOD PRESSURE: 132 MMHG

## 2021-10-07 DIAGNOSIS — D68.9 COAGULATION DEFECT, UNSPECIFIED (HCC): ICD-10-CM

## 2021-10-07 DIAGNOSIS — Z01.818 PREOP TESTING: ICD-10-CM

## 2021-10-07 DIAGNOSIS — D68.8 OTHER SPECIFIED COAGULATION DEFECTS (HCC): ICD-10-CM

## 2021-10-07 LAB
ANION GAP SERPL CALCULATED.3IONS-SCNC: 10.2 MMOL/L (ref 5–15)
APTT PPP: 23.2 SECONDS (ref 22.2–34.2)
BASOPHILS # BLD AUTO: 0.07 10*3/MM3 (ref 0–0.2)
BASOPHILS NFR BLD AUTO: 0.9 % (ref 0–1.5)
BUN SERPL-MCNC: 25 MG/DL (ref 8–23)
BUN/CREAT SERPL: 24.8 (ref 7–25)
CALCIUM SPEC-SCNC: 10.7 MG/DL (ref 8.6–10.5)
CHLORIDE SERPL-SCNC: 102 MMOL/L (ref 98–107)
CO2 SERPL-SCNC: 22.8 MMOL/L (ref 22–29)
CREAT SERPL-MCNC: 1.01 MG/DL (ref 0.57–1)
DEPRECATED RDW RBC AUTO: 42.3 FL (ref 37–54)
EOSINOPHIL # BLD AUTO: 0.14 10*3/MM3 (ref 0–0.4)
EOSINOPHIL NFR BLD AUTO: 1.8 % (ref 0.3–6.2)
ERYTHROCYTE [DISTWIDTH] IN BLOOD BY AUTOMATED COUNT: 13.3 % (ref 12.3–15.4)
GFR SERPL CREATININE-BSD FRML MDRD: 53 ML/MIN/1.73
GLUCOSE SERPL-MCNC: 99 MG/DL (ref 65–99)
HCT VFR BLD AUTO: 39.2 % (ref 34–46.6)
HGB BLD-MCNC: 12.9 G/DL (ref 12–15.9)
IMM GRANULOCYTES # BLD AUTO: 0.02 10*3/MM3 (ref 0–0.05)
IMM GRANULOCYTES NFR BLD AUTO: 0.3 % (ref 0–0.5)
INR PPP: 0.94 (ref 2–3)
LYMPHOCYTES # BLD AUTO: 1.85 10*3/MM3 (ref 0.7–3.1)
LYMPHOCYTES NFR BLD AUTO: 24 % (ref 19.6–45.3)
MCH RBC QN AUTO: 28.8 PG (ref 26.6–33)
MCHC RBC AUTO-ENTMCNC: 32.9 G/DL (ref 31.5–35.7)
MCV RBC AUTO: 87.5 FL (ref 79–97)
MONOCYTES # BLD AUTO: 0.68 10*3/MM3 (ref 0.1–0.9)
MONOCYTES NFR BLD AUTO: 8.8 % (ref 5–12)
NEUTROPHILS NFR BLD AUTO: 4.95 10*3/MM3 (ref 1.7–7)
NEUTROPHILS NFR BLD AUTO: 64.2 % (ref 42.7–76)
NRBC BLD AUTO-RTO: 0 /100 WBC (ref 0–0.2)
PLATELET # BLD AUTO: 279 10*3/MM3 (ref 140–450)
PMV BLD AUTO: 9.6 FL (ref 6–12)
POTASSIUM SERPL-SCNC: 4.7 MMOL/L (ref 3.5–5.2)
PROTHROMBIN TIME: 10.5 SECONDS (ref 9.4–12)
QT INTERVAL: 375 MS
RBC # BLD AUTO: 4.48 10*6/MM3 (ref 3.77–5.28)
SODIUM SERPL-SCNC: 135 MMOL/L (ref 136–145)
WBC # BLD AUTO: 7.71 10*3/MM3 (ref 3.4–10.8)

## 2021-10-07 PROCEDURE — 85730 THROMBOPLASTIN TIME PARTIAL: CPT

## 2021-10-07 PROCEDURE — 93005 ELECTROCARDIOGRAM TRACING: CPT

## 2021-10-07 PROCEDURE — 85025 COMPLETE CBC W/AUTO DIFF WBC: CPT

## 2021-10-07 PROCEDURE — 93010 ELECTROCARDIOGRAM REPORT: CPT | Performed by: INTERNAL MEDICINE

## 2021-10-07 PROCEDURE — 85610 PROTHROMBIN TIME: CPT

## 2021-10-07 PROCEDURE — 80048 BASIC METABOLIC PNL TOTAL CA: CPT

## 2021-10-07 PROCEDURE — 71046 X-RAY EXAM CHEST 2 VIEWS: CPT

## 2021-10-07 PROCEDURE — 36415 COLL VENOUS BLD VENIPUNCTURE: CPT

## 2021-10-07 RX ORDER — OXYMETAZOLINE HYDROCHLORIDE 0.05 G/100ML
2 SPRAY NASAL 2 TIMES DAILY
COMMUNITY
End: 2021-10-13 | Stop reason: HOSPADM

## 2021-10-07 RX ORDER — IBUPROFEN 600 MG/1
600 TABLET ORAL EVERY 6 HOURS PRN
Status: ON HOLD | COMMUNITY
End: 2021-10-13 | Stop reason: SDUPTHER

## 2021-10-07 RX ORDER — ACETAMINOPHEN 325 MG/1
650 TABLET ORAL EVERY 6 HOURS PRN
COMMUNITY
End: 2021-10-13 | Stop reason: HOSPADM

## 2021-10-11 ENCOUNTER — ANESTHESIA EVENT (OUTPATIENT)
Dept: PERIOP | Facility: HOSPITAL | Age: 81
End: 2021-10-11

## 2021-10-12 ENCOUNTER — HOSPITAL ENCOUNTER (OUTPATIENT)
Facility: HOSPITAL | Age: 81
Discharge: HOME | End: 2021-10-13
Attending: OTOLARYNGOLOGY | Admitting: OTOLARYNGOLOGY

## 2021-10-12 ENCOUNTER — ANESTHESIA (OUTPATIENT)
Dept: PERIOP | Facility: HOSPITAL | Age: 81
End: 2021-10-12

## 2021-10-12 DIAGNOSIS — E04.2 MULTIPLE THYROID NODULES: ICD-10-CM

## 2021-10-12 DIAGNOSIS — E83.52 HYPERCALCEMIA: ICD-10-CM

## 2021-10-12 DIAGNOSIS — E21.0 PRIMARY HYPERPARATHYROIDISM (HCC): ICD-10-CM

## 2021-10-12 DIAGNOSIS — D35.1 PARATHYROID ADENOMA: ICD-10-CM

## 2021-10-12 DIAGNOSIS — G89.18 POSTOPERATIVE PAIN: Primary | ICD-10-CM

## 2021-10-12 LAB
CALCIUM SPEC-SCNC: 10 MG/DL (ref 8.6–10.5)
CALCIUM SPEC-SCNC: 10.7 MG/DL (ref 8.6–10.5)
PTH-INTACT SERPL-MCNC: 115.1 PG/ML (ref 15–65)
PTH-INTACT SERPL-MCNC: 56.5 PG/ML (ref 15–65)

## 2021-10-12 PROCEDURE — 25010000002 HYDROMORPHONE 1 MG/ML SOLUTION: Performed by: NURSE ANESTHETIST, CERTIFIED REGISTERED

## 2021-10-12 PROCEDURE — 63710000001 ACETAMINOPHEN 500 MG TABLET: Performed by: ANESTHESIOLOGY

## 2021-10-12 PROCEDURE — 25010000002 ONDANSETRON PER 1 MG: Performed by: NURSE ANESTHETIST, CERTIFIED REGISTERED

## 2021-10-12 PROCEDURE — A9270 NON-COVERED ITEM OR SERVICE: HCPCS | Performed by: OTOLARYNGOLOGY

## 2021-10-12 PROCEDURE — A9270 NON-COVERED ITEM OR SERVICE: HCPCS | Performed by: ANESTHESIOLOGY

## 2021-10-12 PROCEDURE — 94799 UNLISTED PULMONARY SVC/PX: CPT

## 2021-10-12 PROCEDURE — 63710000001 ROSUVASTATIN 5 MG TABLET: Performed by: OTOLARYNGOLOGY

## 2021-10-12 PROCEDURE — C1889 IMPLANT/INSERT DEVICE, NOC: HCPCS | Performed by: OTOLARYNGOLOGY

## 2021-10-12 PROCEDURE — 82310 ASSAY OF CALCIUM: CPT | Performed by: OTOLARYNGOLOGY

## 2021-10-12 PROCEDURE — 83970 ASSAY OF PARATHORMONE: CPT | Performed by: OTOLARYNGOLOGY

## 2021-10-12 PROCEDURE — 63710000001 DOXYCYCLINE 100 MG CAPSULE: Performed by: OTOLARYNGOLOGY

## 2021-10-12 PROCEDURE — 63710000001 BACITRACIN 500 UNIT/GM OINTMENT 14 G TUBE: Performed by: OTOLARYNGOLOGY

## 2021-10-12 PROCEDURE — 88307 TISSUE EXAM BY PATHOLOGIST: CPT | Performed by: OTOLARYNGOLOGY

## 2021-10-12 PROCEDURE — 88305 TISSUE EXAM BY PATHOLOGIST: CPT | Performed by: OTOLARYNGOLOGY

## 2021-10-12 PROCEDURE — 25010000002 MIDAZOLAM PER 1MG: Performed by: ANESTHESIOLOGY

## 2021-10-12 PROCEDURE — 63710000001 SERTRALINE 50 MG TABLET: Performed by: OTOLARYNGOLOGY

## 2021-10-12 PROCEDURE — 63710000001 SPIRONOLACTONE 25 MG TABLET: Performed by: OTOLARYNGOLOGY

## 2021-10-12 PROCEDURE — 25010000002 FENTANYL CITRATE (PF) 50 MCG/ML SOLUTION: Performed by: NURSE ANESTHETIST, CERTIFIED REGISTERED

## 2021-10-12 PROCEDURE — 25010000002 PROPOFOL 10 MG/ML EMULSION: Performed by: NURSE ANESTHETIST, CERTIFIED REGISTERED

## 2021-10-12 PROCEDURE — 63710000001 VALSARTAN 80 MG TABLET: Performed by: OTOLARYNGOLOGY

## 2021-10-12 PROCEDURE — 88331 PATH CONSLTJ SURG 1 BLK 1SPC: CPT | Performed by: OTOLARYNGOLOGY

## 2021-10-12 DEVICE — LIGACLIP MCA MULTIPLE CLIP APPLIERS, 20 SMALL CLIPS
Type: IMPLANTABLE DEVICE | Site: PARATHYROID | Status: FUNCTIONAL
Brand: LIGACLIP

## 2021-10-12 RX ORDER — SPIRONOLACTONE 25 MG/1
25 TABLET ORAL NIGHTLY
Status: DISCONTINUED | OUTPATIENT
Start: 2021-10-12 | End: 2021-10-13 | Stop reason: HOSPADM

## 2021-10-12 RX ORDER — MEPERIDINE HYDROCHLORIDE 25 MG/ML
12.5 INJECTION INTRAMUSCULAR; INTRAVENOUS; SUBCUTANEOUS
Status: DISCONTINUED | OUTPATIENT
Start: 2021-10-12 | End: 2021-10-12

## 2021-10-12 RX ORDER — PROMETHAZINE HYDROCHLORIDE 12.5 MG/1
25 TABLET ORAL ONCE AS NEEDED
Status: DISCONTINUED | OUTPATIENT
Start: 2021-10-12 | End: 2021-10-12

## 2021-10-12 RX ORDER — ONDANSETRON 2 MG/ML
4 INJECTION INTRAMUSCULAR; INTRAVENOUS ONCE AS NEEDED
Status: DISCONTINUED | OUTPATIENT
Start: 2021-10-12 | End: 2021-10-12

## 2021-10-12 RX ORDER — PROPOFOL 10 MG/ML
VIAL (ML) INTRAVENOUS AS NEEDED
Status: DISCONTINUED | OUTPATIENT
Start: 2021-10-12 | End: 2021-10-12 | Stop reason: SURG

## 2021-10-12 RX ORDER — OXYCODONE HYDROCHLORIDE 5 MG/1
5 TABLET ORAL
Status: DISCONTINUED | OUTPATIENT
Start: 2021-10-12 | End: 2021-10-12

## 2021-10-12 RX ORDER — PHENYLEPHRINE HCL IN 0.9% NACL 1 MG/10 ML
SYRINGE (ML) INTRAVENOUS AS NEEDED
Status: DISCONTINUED | OUTPATIENT
Start: 2021-10-12 | End: 2021-10-12 | Stop reason: SURG

## 2021-10-12 RX ORDER — PROMETHAZINE HYDROCHLORIDE 25 MG/1
25 SUPPOSITORY RECTAL ONCE AS NEEDED
Status: DISCONTINUED | OUTPATIENT
Start: 2021-10-12 | End: 2021-10-12

## 2021-10-12 RX ORDER — NITROGLYCERIN 0.4 MG/1
0.4 TABLET SUBLINGUAL
Status: DISCONTINUED | OUTPATIENT
Start: 2021-10-12 | End: 2021-10-13 | Stop reason: HOSPADM

## 2021-10-12 RX ORDER — VALSARTAN 80 MG/1
240 TABLET ORAL NIGHTLY
Status: DISCONTINUED | OUTPATIENT
Start: 2021-10-13 | End: 2021-10-13 | Stop reason: HOSPADM

## 2021-10-12 RX ORDER — VALSARTAN 80 MG/1
80 TABLET ORAL NIGHTLY
Status: DISCONTINUED | OUTPATIENT
Start: 2021-10-12 | End: 2021-10-12

## 2021-10-12 RX ORDER — SODIUM CHLORIDE, SODIUM LACTATE, POTASSIUM CHLORIDE, CALCIUM CHLORIDE 600; 310; 30; 20 MG/100ML; MG/100ML; MG/100ML; MG/100ML
9 INJECTION, SOLUTION INTRAVENOUS CONTINUOUS PRN
Status: DISCONTINUED | OUTPATIENT
Start: 2021-10-12 | End: 2021-10-12

## 2021-10-12 RX ORDER — MIDAZOLAM HYDROCHLORIDE 2 MG/2ML
1 INJECTION, SOLUTION INTRAMUSCULAR; INTRAVENOUS ONCE
Status: COMPLETED | OUTPATIENT
Start: 2021-10-12 | End: 2021-10-12

## 2021-10-12 RX ORDER — ACETAMINOPHEN 500 MG
1000 TABLET ORAL ONCE
Status: COMPLETED | OUTPATIENT
Start: 2021-10-12 | End: 2021-10-12

## 2021-10-12 RX ORDER — SODIUM CHLORIDE 450 MG/100ML
75 INJECTION, SOLUTION INTRAVENOUS CONTINUOUS
Status: DISCONTINUED | OUTPATIENT
Start: 2021-10-12 | End: 2021-10-13 | Stop reason: HOSPADM

## 2021-10-12 RX ORDER — MAGNESIUM HYDROXIDE 1200 MG/15ML
LIQUID ORAL AS NEEDED
Status: DISCONTINUED | OUTPATIENT
Start: 2021-10-12 | End: 2021-10-12 | Stop reason: HOSPADM

## 2021-10-12 RX ORDER — MORPHINE SULFATE 2 MG/ML
2 INJECTION, SOLUTION INTRAMUSCULAR; INTRAVENOUS EVERY 4 HOURS PRN
Status: DISCONTINUED | OUTPATIENT
Start: 2021-10-12 | End: 2021-10-13 | Stop reason: HOSPADM

## 2021-10-12 RX ORDER — METOPROLOL TARTRATE 5 MG/5ML
INJECTION INTRAVENOUS AS NEEDED
Status: DISCONTINUED | OUTPATIENT
Start: 2021-10-12 | End: 2021-10-12 | Stop reason: SURG

## 2021-10-12 RX ORDER — HYDROCODONE BITARTRATE AND ACETAMINOPHEN 7.5; 325 MG/1; MG/1
1 TABLET ORAL EVERY 4 HOURS PRN
Status: DISCONTINUED | OUTPATIENT
Start: 2021-10-12 | End: 2021-10-13 | Stop reason: HOSPADM

## 2021-10-12 RX ORDER — SODIUM CHLORIDE 0.9 % (FLUSH) 0.9 %
10 SYRINGE (ML) INJECTION EVERY 12 HOURS SCHEDULED
Status: DISCONTINUED | OUTPATIENT
Start: 2021-10-12 | End: 2021-10-13 | Stop reason: HOSPADM

## 2021-10-12 RX ORDER — ROSUVASTATIN CALCIUM 5 MG/1
5 TABLET, COATED ORAL NIGHTLY
Status: DISCONTINUED | OUTPATIENT
Start: 2021-10-12 | End: 2021-10-13 | Stop reason: HOSPADM

## 2021-10-12 RX ORDER — LIDOCAINE HYDROCHLORIDE AND EPINEPHRINE 10; 10 MG/ML; UG/ML
INJECTION, SOLUTION INFILTRATION; PERINEURAL AS NEEDED
Status: DISCONTINUED | OUTPATIENT
Start: 2021-10-12 | End: 2021-10-12 | Stop reason: HOSPADM

## 2021-10-12 RX ORDER — EPHEDRINE SULFATE 50 MG/ML
INJECTION, SOLUTION INTRAVENOUS AS NEEDED
Status: DISCONTINUED | OUTPATIENT
Start: 2021-10-12 | End: 2021-10-12 | Stop reason: SURG

## 2021-10-12 RX ORDER — SUCCINYLCHOLINE/SOD CL,ISO/PF 100 MG/5ML
SYRINGE (ML) INTRAVENOUS AS NEEDED
Status: DISCONTINUED | OUTPATIENT
Start: 2021-10-12 | End: 2021-10-12 | Stop reason: SURG

## 2021-10-12 RX ORDER — VALSARTAN 80 MG/1
160 TABLET ORAL NIGHTLY
Status: DISCONTINUED | OUTPATIENT
Start: 2021-10-12 | End: 2021-10-12

## 2021-10-12 RX ORDER — SODIUM CHLORIDE 0.9 % (FLUSH) 0.9 %
10 SYRINGE (ML) INJECTION AS NEEDED
Status: DISCONTINUED | OUTPATIENT
Start: 2021-10-12 | End: 2021-10-13 | Stop reason: HOSPADM

## 2021-10-12 RX ORDER — DOXYCYCLINE 100 MG/1
100 CAPSULE ORAL EVERY 12 HOURS SCHEDULED
Status: DISCONTINUED | OUTPATIENT
Start: 2021-10-12 | End: 2021-10-13 | Stop reason: HOSPADM

## 2021-10-12 RX ORDER — GINSENG 100 MG
CAPSULE ORAL AS NEEDED
Status: DISCONTINUED | OUTPATIENT
Start: 2021-10-12 | End: 2021-10-12 | Stop reason: HOSPADM

## 2021-10-12 RX ORDER — MECLIZINE HYDROCHLORIDE 25 MG/1
25 TABLET ORAL 3 TIMES DAILY PRN
Status: DISCONTINUED | OUTPATIENT
Start: 2021-10-12 | End: 2021-10-13 | Stop reason: HOSPADM

## 2021-10-12 RX ORDER — ONDANSETRON 2 MG/ML
4 INJECTION INTRAMUSCULAR; INTRAVENOUS EVERY 6 HOURS PRN
Status: DISCONTINUED | OUTPATIENT
Start: 2021-10-12 | End: 2021-10-13 | Stop reason: HOSPADM

## 2021-10-12 RX ORDER — GLYCOPYRROLATE 0.2 MG/ML
0.2 INJECTION INTRAMUSCULAR; INTRAVENOUS
Status: COMPLETED | OUTPATIENT
Start: 2021-10-12 | End: 2021-10-12

## 2021-10-12 RX ORDER — PANTOPRAZOLE SODIUM 40 MG/1
40 TABLET, DELAYED RELEASE ORAL EVERY MORNING
Status: DISCONTINUED | OUTPATIENT
Start: 2021-10-13 | End: 2021-10-13 | Stop reason: HOSPADM

## 2021-10-12 RX ORDER — FENTANYL CITRATE 50 UG/ML
INJECTION, SOLUTION INTRAMUSCULAR; INTRAVENOUS AS NEEDED
Status: DISCONTINUED | OUTPATIENT
Start: 2021-10-12 | End: 2021-10-12 | Stop reason: SURG

## 2021-10-12 RX ADMIN — DOXYCYCLINE 100 MG: 100 CAPSULE ORAL at 20:56

## 2021-10-12 RX ADMIN — VALSARTAN 160 MG: 80 TABLET, FILM COATED ORAL at 22:09

## 2021-10-12 RX ADMIN — SODIUM CHLORIDE, PRESERVATIVE FREE 10 ML: 5 INJECTION INTRAVENOUS at 20:57

## 2021-10-12 RX ADMIN — PROPOFOL 100 MCG/KG/MIN: 10 INJECTION, EMULSION INTRAVENOUS at 14:36

## 2021-10-12 RX ADMIN — ROSUVASTATIN CALCIUM 5 MG: 5 TABLET, FILM COATED ORAL at 20:56

## 2021-10-12 RX ADMIN — GLYCOPYRROLATE 0.2 MG: 0.2 INJECTION INTRAMUSCULAR; INTRAVENOUS at 14:20

## 2021-10-12 RX ADMIN — EPHEDRINE SULFATE 10 MG: 50 INJECTION INTRAVENOUS at 16:24

## 2021-10-12 RX ADMIN — SODIUM CHLORIDE, POTASSIUM CHLORIDE, SODIUM LACTATE AND CALCIUM CHLORIDE: 600; 310; 30; 20 INJECTION, SOLUTION INTRAVENOUS at 16:46

## 2021-10-12 RX ADMIN — MIDAZOLAM HYDROCHLORIDE 1 MG: 1 INJECTION, SOLUTION INTRAMUSCULAR; INTRAVENOUS at 14:20

## 2021-10-12 RX ADMIN — SERTRALINE HYDROCHLORIDE 50 MG: 50 TABLET ORAL at 20:56

## 2021-10-12 RX ADMIN — METOPROLOL TARTRATE 1 MG: 1 INJECTION, SOLUTION INTRAVENOUS at 15:57

## 2021-10-12 RX ADMIN — ACETAMINOPHEN 1000 MG: 500 TABLET ORAL at 14:11

## 2021-10-12 RX ADMIN — VALSARTAN 80 MG: 80 TABLET, FILM COATED ORAL at 22:10

## 2021-10-12 RX ADMIN — PROPOFOL 200 MG: 10 INJECTION, EMULSION INTRAVENOUS at 14:34

## 2021-10-12 RX ADMIN — ONDANSETRON 4 MG: 2 INJECTION INTRAMUSCULAR; INTRAVENOUS at 17:36

## 2021-10-12 RX ADMIN — SODIUM CHLORIDE 75 ML/HR: 4.5 INJECTION, SOLUTION INTRAVENOUS at 18:34

## 2021-10-12 RX ADMIN — FENTANYL CITRATE 100 MCG: 50 INJECTION INTRAMUSCULAR; INTRAVENOUS at 14:34

## 2021-10-12 RX ADMIN — SODIUM CHLORIDE, POTASSIUM CHLORIDE, SODIUM LACTATE AND CALCIUM CHLORIDE: 600; 310; 30; 20 INJECTION, SOLUTION INTRAVENOUS at 14:26

## 2021-10-12 RX ADMIN — SPIRONOLACTONE 25 MG: 25 TABLET ORAL at 20:56

## 2021-10-12 RX ADMIN — Medication 200 MCG: at 15:00

## 2021-10-12 RX ADMIN — Medication 100 MCG: at 15:42

## 2021-10-12 RX ADMIN — EPHEDRINE SULFATE 5 MG: 50 INJECTION INTRAVENOUS at 16:27

## 2021-10-12 RX ADMIN — Medication 100 MG: at 14:34

## 2021-10-12 RX ADMIN — HYDROMORPHONE HYDROCHLORIDE 0.5 MG: 1 INJECTION, SOLUTION INTRAMUSCULAR; INTRAVENOUS; SUBCUTANEOUS at 17:33

## 2021-10-12 RX ADMIN — EPHEDRINE SULFATE 20 MG: 50 INJECTION INTRAVENOUS at 14:55

## 2021-10-12 RX ADMIN — Medication 200 MCG: at 15:32

## 2021-10-12 NOTE — PLAN OF CARE
Goal Outcome Evaluation:  Plan of Care Reviewed With: patient       Patient is a new admit to the flow states she is tired but able to answer all my questions and is accompanied by her friend sheridan at the bedside.     Sadia Valentino RN       Progress: improving

## 2021-10-12 NOTE — H&P
PRIMARY CARE PROVIDER: Masood Braden APRN  REFERRING PROVIDER: Kenji Farley MD    CHIEF COMPLAINT:  Preoperative evaluation for surgery    Subjective   History of Present Illness:  Kathryn More is a  81 y.o.  female who is here for the following problems:    Primary hyperparathyroidism (HCC)    Hypercalcemia    Parathyroid adenoma    Multiple thyroid nodules      She is scheduled for NECK EXPLORATION WITH PARATHYROID ADENOMA EXCISION AND FROZEN SECTION, INTRAOPERATIVE INTACT PTH ASSAY, POSSIBLE THYROIDECTOMY, POSSIBLE TOTAL THYROIDECTOMY, RECURRENT LARYNGEAL NERVE MONITORING (Bilateral). There has been no significant change in the history since the preoperative office evaluation.     Review of Systems:  CONSTITUTIONAL: no fever or chills  PULMONARY: no cough or shortness of breath  GI: no nausea or vomiting    Past History:  Medical History: has a past medical history of Arthritis, Disease of thyroid gland, Emphysema of lung (HCC), GERD (gastroesophageal reflux disease), Hyperlipidemia, Hypertension, Lung nodules, Macular degeneration, PONV (postoperative nausea and vomiting), Skin cancer, Sleep apnea, and Vertigo.   Surgical History: has a past surgical history that includes Cardiac catheterization; Cholecystectomy; Eye surgery; Dilation and curettage of uterus; Breast surgery; and Skin cancer excision.   Family History: family history is not on file.   Social History: reports that she has never smoked. She has never used smokeless tobacco. Drug use questions deferred to the physician. She reports that she does not drink alcohol.  Home Medications:  Aflibercept, Coenzyme Q10, Multiple Vitamins-Minerals, Probiotic Product, acetaminophen, aspirin, butalbital-acetaminophen-caffeine, doxycycline, ibuprofen, magnesium oxide, meclizine, multivitamin, nitroglycerin, omeprazole, oxymetazoline, rosuvastatin, sertraline, spironolactone, traMADol, and valsartan     Allergies: Biaxin [clarithromycin], Epinephrine,  Meperidine, Lidocaine-epinephrine, and Procaine   }  History     Last Reviewed by Isacc Phelan MD on 10/12/2021 at 12:46 PM    Sections Reviewed    Medical, Surgical, Tobacco, Alcohol, Drug Use, Family, Obstetric         Objective     Vital Signs:  Temp:  [97.9 °F (36.6 °C)] 97.9 °F (36.6 °C)  Heart Rate:  [70] 70  Resp:  [18] 18  BP: (143)/(61) 143/61    Physical Exam:  CONSTITUTIONAL: well nourished, well-developed, alert, oriented, in no acute distress   COMMUNICATION AND VOICE: able to communicate normally, normal voice quality  HEAD: normocephalic, no lesions, atraumatic, no tenderness, no masses   FACE: appearance normal, no lesions, no tenderness, no deformities, facial motion symmetric  EYES: ocular motility normal, eyelids normal, orbits normal, no proptosis, conjunctiva normal , pupils equal, round   EARS:  Hearing: hearing to conversational voice intact bilaterally   External Ears: normal bilaterally, no lesions  NOSE:  External Nose: external nasal structure normal, no tenderness on palpation, no nasal discharge, no lesions, no evidence of trauma, nostrils patent   ORAL:  Lips: upper and lower lips without lesion   NECK:  Inspection and Palpation: neck appearance normal, no masses or tenderness  CHEST/RESPIRATORY: normal respiratory effort   CARDIOVASCULAR: no cyanosis or edema   NEUROLOGICAL/PSYCHIATRIC: oriented to time, place and person, mood normal, affect appropriate, CN II-XII intact grossly    ASSESSMENT:    Primary hyperparathyroidism (HCC)    Hypercalcemia    Parathyroid adenoma    Multiple thyroid nodules    PLAN:  NECK EXPLORATION WITH PARATHYROID ADENOMA EXCISION AND FROZEN SECTION, INTRAOPERATIVE INTACT PTH ASSAY, POSSIBLE THYROIDECTOMY, POSSIBLE TOTAL THYROIDECTOMY, RECURRENT LARYNGEAL NERVE MONITORING (Bilateral)    PARATHYROIDECTOMY: A parathyroid exploration and excision was recommended. The risks and benefits were explained including but not limited to bleeding, infection, risks of  the general anesthesia, pain, recurrent laryngeal nerve injury with hoarseness and airway loss, hypocalcemia (temporary or permanent), and persistent hypercalcemia. Operative possibilities including 3/4 parathyroidectomy, parathyroid reimplantation, hemithyroidectomy, total thyroidectomy, and bill dissections were discussed. Alternatives were discussed. Questions were asked appropriately answered.      Kenji Farley MD  10/12/21  14:02 EDT

## 2021-10-12 NOTE — ANESTHESIA PREPROCEDURE EVALUATION
Anesthesia Evaluation     Patient summary reviewed and Nursing notes reviewed   no history of anesthetic complications:  NPO Solid Status: > 8 hours  NPO Liquid Status: > 2 hours           Airway   Mallampati: I  TM distance: >3 FB  Neck ROM: full  No difficulty expected  Dental      Pulmonary - normal exam    breath sounds clear to auscultation  (+) COPD mild,   Cardiovascular - normal exam  Exercise tolerance: good (4-7 METS)    Rhythm: regular    (+) hypertension,     ROS comment:  less than 4 mets, sob related due to recent pneumonia  hx chronic cough, ef 50-60%, cath earlier this year within nl limits  chest xray clear 10/7  ekg nsr rate 67, left anterior fascicular block    Neuro/Psych- negative ROS  GI/Hepatic/Renal/Endo    (+)  GERD,      Musculoskeletal     Abdominal    Substance History - negative use     OB/GYN negative ob/gyn ROS         Other   arthritis,                      Anesthesia Plan    ASA 2     general       Anesthetic plan, all risks, benefits, and alternatives have been provided, discussed and informed consent has been obtained with: patient.

## 2021-10-12 NOTE — ANESTHESIA POSTPROCEDURE EVALUATION
Patient: Kathryn More    Procedure Summary     Date: 10/12/21 Room / Location: [facility] OR 02 / [facility] MAIN OR    Anesthesia Start: 1426 Anesthesia Stop: 1647    Procedure: NECK EXPLORATION WITH LEFT PARATHYROID ADENOMA EXCISION AND FROZEN SECTION, INTRAOPERATIVE INTACT PTH ASSAY, RIGHT THYROIDECTOMY WITH FROZEN SECTION; LEFT PARTIAL THYROIDECTOMY; RECURRENT LARYNGEAL NERVE MONITORING (Bilateral Neck) Diagnosis:       Primary hyperparathyroidism (HCC)      Hypercalcemia      Parathyroid adenoma      Multiple thyroid nodules      Papillary thyroid carcinoma (HCC)      (Primary hyperparathyroidism (HCC) [E21.0])      (Hypercalcemia [E83.52])      (Parathyroid adenoma [D35.1])      (Multiple thyroid nodules [E04.2])    Surgeons: Kenji Farley MD Provider: Pacheco Stephenson MD    Anesthesia Type: general ASA Status: 2          Anesthesia Type: general    Vitals  Vitals Value Taken Time   /86 10/12/21 1716   Temp 36.3 °C (97.3 °F) 10/12/21 1715   Pulse 98 10/12/21 1719   Resp 18 10/12/21 1715   SpO2 98 % 10/12/21 1719   Vitals shown include unvalidated device data.        Post Anesthesia Care and Evaluation    Patient location during evaluation: bedside  Patient participation: complete - patient participated  Level of consciousness: awake and alert  Pain management: adequate  Airway patency: patent  Anesthetic complications: No anesthetic complications  PONV Status: none  Cardiovascular status: acceptable  Respiratory status: acceptable  Hydration status: acceptable

## 2021-10-12 NOTE — OP NOTE
Operative Note    Kathryn More  10/12/2021    Pre-op Diagnosis:   Primary hyperparathyroidism (HCC) [E21.0]  Hypercalcemia [E83.52]  Parathyroid adenoma [D35.1]  Multiple thyroid nodules [E04.2]    Post-op Diagnosis:        * Primary hyperparathyroidism (HCC) [E21.0]     * Hypercalcemia [E83.52]     * Parathyroid adenoma [D35.1]     * Multiple thyroid nodules [E04.2]     * Papillary thyroid carcinoma (HCC) [C73]    Procedure/CPT® Codes:  1.  NECK EXPLORATION WITH LEFT PARATHYROID ADENOMA EXCISION AND FROZEN SECTION  2.  LEFT PARTIAL THYROIDECTOMY WITH FROZEN SECTION  3.  INTRAOPERATIVE INTACT PTH ASSAY  4.  RIGHT THYROIDECTOMY WITH ISTHMUSECTOMY AND FROZEN SECTION  5.  RECURRENT LARYNGEAL NERVE MONITORING (1 HOUR 30 MINUTES)    Surgeon(s):  Kenji Farley MD    Anesthesia: General    Staff:   Circulator: Fidelia Vivas RN  Scrub Person: Graciela Mendoza; Destinee Torres  Assistant: Alyssa Eaton  Other: Meghan Hirsch RN    Estimated Blood Loss:   5 MLS    Specimens:                Specimens     ID Source Type Tests Collected By Collected At Frozen?    A Parathyroid Gland Tissue · TISSUE PATHOLOGY EXAM   Kenji Farley MD 10/12/21 1527 Yes    Description: LEFT INFERIOR PARATHYROID ADENOMA    Comment: CALL RESULTS TO OR2 1315    B Parathyroid Gland Tissue · TISSUE PATHOLOGY EXAM   Kenji Farley MD 10/12/21 1529 Yes    Description: LEFT PARATHYROID ADENOMA #2    C Thyroid Tissue · TISSUE PATHOLOGY EXAM   Kenji Farley MD 10/12/21 1604 Yes    Description: RIGHT THYROID WITH ISTHMUS          Implants:    Implant Name Type Inv. Item Serial No.  Lot No. LRB No. Used Action   CLIPAPPLR M/ ENDO LIGACLIP 9 3/8IN  - [id number] - [id number] Implant CLIPAPPLR M/ ENDO LIGACLIP 9 3/8IN  [id number] [employer]  DIV OF J AND J [id number]  1 Implanted   CLIPAPPLR M/ ENDO LIGACLIP 9 3/8IN  - [id number] - [id number] Implant  CLIPAPPLR M/ ENDO LIGACLIP 9 3/8IN SM [id number] [employer]  DIV OF J AND J [id number]  1 Implanted   CLIPAPPLR M/ ENDO LIGACLIP 9 3/8IN  - [id number] - [id number] Implant CLIPAPPLR M/ ENDO LIGACLIP 9 3/8IN SM [id number] [employer]  DIV OF J AND J [id number]  1 Implanted   CLIPAPPLR M/ ENDO LIGACLIP 9 3/8IN  - [id number] - [id number] Implant CLIPAPPLR M/ ENDO LIGACLIP 9 3/8IN  [id number] [employer]  DIV OF J AND J [id number]  1 Implanted       Drains:   10 Kosovan hemovac drain with grenade suction    Findings:   1.  Left thyroid reflected medially and anteriorly exposing the tracheal esophageal groove area.  However in the posterior inferior thyroid was a nodule which was dissected and removed measuring 1.3 cm x 0.8 cm suspecting possibly left inferior parathyroid adenoma.  Therefore it was sent for frozen section.  Otherwise rest of left thyroid was unremarkable for any nodules.  Therefore remainder of the left thyroid was left alone.  2.  Further dissection on the tracheoesophageal groove but posterior medial aspect of thyroid found another mass which was more likely to be the parathyroid adenoma.  It measured 1.5 cm x 1.1 cm and sent for frozen section labeled as inferior parathyroid adenoma #2.  3.  Since the second specimen was more likely the adenoma of parathyroid, timer was set after second adenoma was removed for drawing intraoperative intact PTH and calcium.  4. As expected, first frozen section returned as benign thyroid adenoma and second specimen confirmed parathyroid adenoma as hypercellular parathyroid.  5.  Preop calcium was 10.7 and intact .  10-minute post adenoma removal calcium dropped to 10.0 and intact PTH 56.53.  6.  Left superior parathyroid gland was left intact without visual confirmation.  Also left recurrent laryngeal nerve was identified,  confirmed with nerve probe and preserved.  7.  Right thyroid was explored for removal since ultrasound revealed TR 3 and TR 4 nodules.  Right thyroid upon removal measured 4 cm x 4 cm with isthmus and the superior nodule was 1 cm x 1.5 cm and the inferior thyroid nodule was 2 cm x 1.5 cm.  8.  Right thyroid frozen section revealed 2 mm papillary thyroid carcinoma well-contained.  However details of the report is pending permanents.  Therefore it was decided not to proceed with total thyroidectomy at this time and leave the left side alone.  9.  Right recurrent laryngeal nerve was left intact confirmed with nerve probe and right superior and inferior parathyroid glands were also left intact.  10.  10 Serbian Hemovac drain with grenade suction was placed.  11.  Postop voice was normal.    Complications:   none    Reason for the Operation:  Kathryn More is a 81 y.o. female who presented to the office for evaluation of hypercalcemia and hyperparathyroidism. A parathyroid exploration and excision was recommended. The risks and benefits were explained including but not limited to bleeding, infection, risks of the general anesthesia, pain, recurrent laryngeal nerve injury with hoarseness and airway loss, hypocalcemia (temporary or permanent), and persistent hypercalcemia. Operative possibilities including 3/4 parathyroidectomy, parathyroid reimplantation, hemithyroidectomy,and  total thyroidectomy were discussed. Alternatives were discussed. Questions were asked appropriately answered and a consent was obtained.     Procedure Description:     The patient was taken to the operating room and general endotracheal    anesthesia was performed in the supine position using special endotracheal    tube with electrodes in place for continuous nerve monitoring.  After the    patient had been adequately anesthetized and endotracheal tube secured, the    patient was placed in hyperextension with shoulder roll in place.  Neck was     then prepped with Betadine and draped in the usual sterile manner.  Ground    electrodes for the nerve monitoring were placed and then hooked up to nerve    monitor for continuous nerve monitoring.  At this point incision line that    was drawn preoperatively was marked again and infiltrated with 1% Xylocaine    with 1:100,000 epinephrine.  Incision was then carried out with a 15-blade,    followed by electrocautery for hemostasis.  Subplatysmal flap was elevated    superiorly and inferiorly, followed by [place] retractor placement with    retraction devices in place.  Deep fascia was divided in the midline through    the median raphe and strap muscles were retracted on the left side.  At that    point the trachea was visible with isthmus of thyroid present.   Left thyroid was  retracted medially exposing tracheoesophageal groove.   Then subsequent dissection was identified recurrent laryngeal nerve with confirmation   using nerve probe.   Inferiorly and posteriorly on the left thyroid was a separate nodule or mass which mimicked parathyroid adenoma and therefore this was dissected and removed measuring 1.3 cm x 0.8 cm.  However this nodule did not appear to be a parathyroid adenoma as this texture was little bit different and more firm.  However it was indeed sent for frozen section.  In the meantime further dissection along the tracheoesophageal groove anterior to the recurrent laryngeal nerve was performed and at this point fairly similar size tumor which appeared to be more likely the parathyroid adenoma based on its texture and color.  Measure 1.5 cm x 1.1 cm and it was also sent for frozen section labeled as inferior parathyroid adenoma #2.  Also at this point clock was started for drawing intact PTH and calcium for 10-minute post removal of adenoma.  Left inferior parathyroid adenoma #1 on frozen section confirmed benign thyroid adenoma but the second specimen confirmed parathyroid adenoma revealing hyper  cellular parathyroid gland.  While waiting for intact PTH and calcium level, right thyroid was explored since ultrasound revealed TR 3 and TR 4 nodules.  Right thyroid measured 4 cm x 4 cm with isthmus upon removal and there were two nodules which the superior nodule was 1 cm x 1.5 cm and the inferior nodule was 2 cm x 1.5 cm.  Upon removal of the right thyroid with isthmus, it was immediately sent for frozen section.  In the meantime, the wound was copiously irrigated and    cleaned.  Nerve was confirmed again with the probe on the right side as well.    What was thought to be right superior and inferior parathyroid gland was left intact. Therefore, a 10-Palestinian    drain was placed in the cavity and brought out through a separate stab    incision.  Midline fascia was closed with 4-0 Vicryl in a running fashion and    subcutaneous closure was made with 4-0 Vicryl and 5-0 Vicryl in an    interrupted manner.  Skin was closed with 6-0 Prolene in    a running fashion and drain was sutured in place with 6-0 Prolene and hooked    up to grenade suction. While waiting for frozen section on the right thyroid with isthmus,10-minute    post-removal calcium and PTH level returned with calcium dropped to 10.0 from preop of 10.7 and intact PTH dropped to 56.53 from 112 preop.  Frozen section on right thyroid with isthmus revealed 2 mm focus of papillary thyroid carcinoma well-contained within the capsule.  Therefore, it was    decided not to explore other parathyroid glands for adenoma or remove the remaining left thyroid gland. And of course bacitracin was applied along the incision.  Subsequently, the patient was extubated and transferred  to the recovery room in good condition.  Postop voice was normal.      Kenji Farley MD     Date: 10/12/2021  Time: 17:17 EDT

## 2021-10-13 VITALS
HEIGHT: 62 IN | RESPIRATION RATE: 18 BRPM | SYSTOLIC BLOOD PRESSURE: 105 MMHG | WEIGHT: 168.21 LBS | HEART RATE: 80 BPM | OXYGEN SATURATION: 97 % | DIASTOLIC BLOOD PRESSURE: 53 MMHG | BODY MASS INDEX: 30.95 KG/M2 | TEMPERATURE: 98.78 F

## 2021-10-13 LAB
CALCIUM SPEC-SCNC: 8.8 MG/DL (ref 8.6–10.5)
PTH-INTACT SERPL-MCNC: 22.6 PG/ML (ref 15–65)

## 2021-10-13 PROCEDURE — 83970 ASSAY OF PARATHORMONE: CPT | Performed by: OTOLARYNGOLOGY

## 2021-10-13 PROCEDURE — 63710000001 PANTOPRAZOLE 40 MG TABLET DELAYED-RELEASE: Performed by: OTOLARYNGOLOGY

## 2021-10-13 PROCEDURE — 25010000002 ONDANSETRON PER 1 MG: Performed by: OTOLARYNGOLOGY

## 2021-10-13 PROCEDURE — 63710000001 HYDROCODONE-ACETAMINOPHEN 7.5-325 MG TABLET: Performed by: OTOLARYNGOLOGY

## 2021-10-13 PROCEDURE — A9270 NON-COVERED ITEM OR SERVICE: HCPCS | Performed by: OTOLARYNGOLOGY

## 2021-10-13 PROCEDURE — 63710000001 DOXYCYCLINE 100 MG CAPSULE: Performed by: OTOLARYNGOLOGY

## 2021-10-13 PROCEDURE — 63710000001 MAGNESIUM OXIDE 400 (240 MG) MG TABLET: Performed by: OTOLARYNGOLOGY

## 2021-10-13 PROCEDURE — 82310 ASSAY OF CALCIUM: CPT | Performed by: OTOLARYNGOLOGY

## 2021-10-13 PROCEDURE — 25010000002 MORPHINE PER 10 MG: Performed by: OTOLARYNGOLOGY

## 2021-10-13 PROCEDURE — 94799 UNLISTED PULMONARY SVC/PX: CPT

## 2021-10-13 RX ORDER — HYDROCODONE BITARTRATE AND ACETAMINOPHEN 7.5; 325 MG/1; MG/1
1-2 TABLET ORAL EVERY 4 HOURS PRN
Qty: 20 TABLET | Refills: 0 | OUTPATIENT
Start: 2021-10-13 | End: 2022-06-13

## 2021-10-13 RX ORDER — ASPIRIN 81 MG/1
81 TABLET, CHEWABLE ORAL DAILY
Start: 2021-10-20

## 2021-10-13 RX ORDER — IBUPROFEN 600 MG/1
600 TABLET ORAL EVERY 6 HOURS PRN
Start: 2021-10-20 | End: 2022-08-08

## 2021-10-13 RX ORDER — CEPHALEXIN 250 MG/1
250 CAPSULE ORAL 4 TIMES DAILY
Qty: 28 CAPSULE | Refills: 0 | Status: SHIPPED | OUTPATIENT
Start: 2021-10-13 | End: 2021-10-20

## 2021-10-13 RX ADMIN — HYDROCODONE BITARTRATE AND ACETAMINOPHEN 1 TABLET: 7.5; 325 TABLET ORAL at 01:40

## 2021-10-13 RX ADMIN — PANTOPRAZOLE SODIUM 40 MG: 40 TABLET, DELAYED RELEASE ORAL at 06:18

## 2021-10-13 RX ADMIN — DOXYCYCLINE 100 MG: 100 CAPSULE ORAL at 09:41

## 2021-10-13 RX ADMIN — MORPHINE SULFATE 2 MG: 2 INJECTION, SOLUTION INTRAMUSCULAR; INTRAVENOUS at 00:10

## 2021-10-13 RX ADMIN — SODIUM CHLORIDE 75 ML/HR: 4.5 INJECTION, SOLUTION INTRAVENOUS at 05:18

## 2021-10-13 RX ADMIN — ONDANSETRON 4 MG: 2 INJECTION INTRAMUSCULAR; INTRAVENOUS at 00:10

## 2021-10-13 NOTE — DISCHARGE SUMMARY
Date of Discharge:  10/13/2021    Discharge Diagnosis:   Same as below    Problem List:  Active Hospital Problems    Diagnosis  POA   • Primary hyperparathyroidism (HCC) [E21.0]  Yes      Resolved Hospital Problems    Diagnosis Date Resolved POA   • Primary hyperparathyroidism (HCC) [E21.0] 10/12/2021 Unknown   • Hypercalcemia [E83.52] 10/12/2021 Unknown   • Parathyroid adenoma [D35.1] 10/12/2021 Unknown   • Multiple thyroid nodules [E04.2] 10/12/2021 Unknown       Presenting Problem/History of Present Illness  Primary hyperparathyroidism (HCC) [E21.0]  Hypercalcemia [E83.52]  Parathyroid adenoma [D35.1]  Multiple thyroid nodules [E04.2]    Hospital Course  Patient is a 81 y.o. female presented with above problems and she underwent below procedure uneventfully.  Patient first postop day was unremarkable.  Her voice is good.  Incision is unremarkable.  Chvostek sign negative.  Drainage is still bloody.  Calcium is normal at 8.8 and intact PTH down to 22.  Therefore patient is being discharged home with drain in place with instructions medications and follow-up.    Procedures Performed    Procedure(s):  NECK EXPLORATION WITH LEFT PARATHYROID ADENOMA EXCISION AND FROZEN SECTION, INTRAOPERATIVE INTACT PTH ASSAY, RIGHT THYROIDECTOMY WITH FROZEN SECTION; LEFT PARTIAL THYROIDECTOMY; RECURRENT LARYNGEAL NERVE MONITORING  -------------------       Consults:   Consults     No orders found for last 30 day(s).          Pertinent Test Results: See lab results    Condition on Discharge: Stable    Vital Signs  Temp:  [97.7 °F (36.5 °C)-98.78 °F (37.1 °C)] 98.78 °F (37.1 °C)  Heart Rate:  [66-88] 88  Resp:  [13-23] 18  BP: (105-130)/(53-66) 105/53    Discharge Disposition  Home or Self Care    Discharge Medications     Discharge Medications      New Medications      Instructions Start Date   cephalexin 250 MG capsule  Commonly known as: Keflex   250 mg, Oral, 4 Times Daily      HYDROcodone-acetaminophen 7.5-325 MG per  tablet  Commonly known as: NORCO   1-2 tablets, Oral, Every 4 Hours PRN         Changes to Medications      Instructions Start Date   aspirin 81 MG chewable tablet  What changed: These instructions start on October 20, 2021. If you are unsure what to do until then, ask your doctor or other care provider.   81 mg, Oral, Daily, Last dose 10/04/21 per pt   Start Date: October 20, 2021     ibuprofen 600 MG tablet  Commonly known as: ADVDOM NAGY  What changed: These instructions start on October 20, 2021. If you are unsure what to do until then, ask your doctor or other care provider.   600 mg, Oral, Every 6 Hours PRN   Start Date: October 20, 2021        Continue These Medications      Instructions Start Date   COQ-10 PO   1 capsule, Oral, Daily      doxycycline 100 MG capsule  Commonly known as: VIBRAMYCIN   100 mg, Oral, 2 Times Daily      Eylea 2 MG/0.05ML solution  Generic drug: Aflibercept   Intravitreal, Every 2 Months      magnesium oxide 400 (241.3 Mg) MG tablet tablet  Commonly known as: MAGOX   400 mg, Oral, Daily      meclizine 25 MG tablet  Commonly known as: ANTIVERT   25 mg, Oral, 3 Times Daily PRN      multivitamin tablet tablet   Oral, Daily, One a day vitamin       nitroglycerin 0.4 MG SL tablet  Commonly known as: NITROSTAT   0.4 mg, Sublingual, Every 5 Minutes PRN, Take no more than 3 doses in 15 minutes.       omeprazole 20 MG capsule  Commonly known as: priLOSEC   No dose, route, or frequency recorded.      PRESERVISION AREDS 2 PO   Oral      PROBIOTIC ADVANCED PO   1 capsule, Oral, Daily      rosuvastatin 5 MG tablet  Commonly known as: CRESTOR   5 mg, Oral, Nightly      sertraline 50 MG tablet  Commonly known as: ZOLOFT   50 mg, Oral, Nightly      spironolactone 25 MG tablet  Commonly known as: ALDACTONE   25 mg, Oral, Nightly      valsartan 160 MG tablet  Commonly known as: DIOVAN   160 mg, Oral, Nightly      valsartan 80 MG tablet  Commonly known as: DIOVAN   80 mg, Oral, Nightly, Takes along  with 160mg tab         Stop These Medications    acetaminophen 325 MG tablet  Commonly known as: TYLENOL     Esgic -40 MG per tablet  Generic drug: butalbital-acetaminophen-caffeine     oxymetazoline 0.05 % nasal spray  Commonly known as: AFRIN     traMADol 50 MG tablet  Commonly known as: ULTRAM            Discharge Diet   Diet Instructions     Advance Diet as Tolerated            Activity at Discharge  Activity Instructions     Discharge Activity      1) No driving while taking narcotics.   2) Return to school / work as instructed or when ready  3) May shower after drain is removed and drain hole is sealed  4) Do not lift / push / pull more than 10 lbs.          Follow-up Appointments  Future Appointments   Date Time Provider Department Center   10/21/2021 11:15 AM Joby Sheriff MD [facility]   2/4/2022  2:30 PM Herve Srivastava MD [facility]     Additional Instructions for the Follow-ups that You Need to Schedule     Discharge Follow-up with Specified Provider: DR. FARLEY; 1 Week   As directed      To: DR. FARLEY    Follow Up: 1 Week    Follow Up Details: WHEN THE DRAINAGE IS LESS THAN 5 MILLILITERS OVER 24 HOURS, CALL OFFICE FOR DRAIN REMOVAL.  OTHERWISE KEEP THE APPOINTMENT ALREADY SCHEDULED IN ONE WEEK FOR SUTURE REMOVAL         Dressing Change Instructions   As directed      Keep incision dry until drain is removed and drain hole is sealed.  If no drain, keep incision dry for 48 hours    Order Comments: Keep incision dry until drain is removed and drain hole is sealed.  If no drain, keep incision dry for 48 hours          Notify Physician or Go To The ED For the Following Conditions   As directed      Notify Dr. Gino Farley if there is any drain malfunction or facial nerve weakness.    Order Comments: Notify Dr. Gino Farley if there is any drain malfunction or facial nerve weakness.                Test Results Pending at Discharge  Pending Labs     Order Current Status    Tissue Pathology Exam In  process          Time: 30 minutes

## 2021-10-13 NOTE — PLAN OF CARE
Goal Outcome Evaluation: Pt's pain treated per MAR. Pt reported nausea once, treated per MAR. Pt's neck incision clean and approximated. Wound care performed as ordered. JANIE drain to bulb suction. Will continue to monitor. Rose Eugene RN

## 2021-10-13 NOTE — PLAN OF CARE
Goal Outcome Evaluation:         Pt dc  home with friend. Pt verbalized understanding care management of JANIE drain. Pt will follow up with provider as requested. Iv removed.

## 2021-10-15 LAB
CYTO UR: NORMAL
LAB AP CASE REPORT: NORMAL
LAB AP CLINICAL INFORMATION: NORMAL
LAB AP SYNOPTIC CHECKLIST: NORMAL
Lab: NORMAL
PATH REPORT.FINAL DX SPEC: NORMAL
PATH REPORT.GROSS SPEC: NORMAL

## 2021-11-02 ENCOUNTER — HOSPITAL ENCOUNTER (OUTPATIENT)
Dept: PET IMAGING | Facility: HOSPITAL | Age: 81
End: 2021-11-02

## 2021-11-02 ENCOUNTER — HOSPITAL ENCOUNTER (OUTPATIENT)
Dept: PET IMAGING | Facility: HOSPITAL | Age: 81
Discharge: HOME | End: 2021-11-02

## 2021-11-02 DIAGNOSIS — R91.1 LUNG NODULE: ICD-10-CM

## 2021-11-02 DIAGNOSIS — R05.3 CHRONIC COUGH: ICD-10-CM

## 2021-11-02 DIAGNOSIS — R93.89 ABNORMAL CHEST CT: ICD-10-CM

## 2021-12-06 ENCOUNTER — HOSPITAL ENCOUNTER (OUTPATIENT)
Dept: PET IMAGING | Facility: HOSPITAL | Age: 81
Discharge: HOME | End: 2021-12-06

## 2021-12-06 PROCEDURE — 78815 PET IMAGE W/CT SKULL-THIGH: CPT

## 2021-12-06 PROCEDURE — 0 FLUDEOXYGLUCOSE F18 SOLUTION: Performed by: NURSE PRACTITIONER

## 2021-12-06 PROCEDURE — A9552 F18 FDG: HCPCS | Performed by: NURSE PRACTITIONER

## 2021-12-06 RX ADMIN — FLUDEOXYGLUCOSE F18 1 DOSE: 300 INJECTION INTRAVENOUS at 09:50

## 2021-12-07 DIAGNOSIS — R91.1 LUNG NODULE: Primary | ICD-10-CM

## 2022-01-13 ENCOUNTER — OFFICE VISIT (OUTPATIENT)
Dept: PULMONOLOGY | Facility: CLINIC | Age: 82
End: 2022-01-13

## 2022-01-13 VITALS
DIASTOLIC BLOOD PRESSURE: 64 MMHG | BODY MASS INDEX: 30.91 KG/M2 | RESPIRATION RATE: 18 BRPM | WEIGHT: 168 LBS | SYSTOLIC BLOOD PRESSURE: 131 MMHG | HEART RATE: 99 BPM | TEMPERATURE: 97.3 F | HEIGHT: 62 IN | OXYGEN SATURATION: 97 %

## 2022-01-13 DIAGNOSIS — R91.1 PULMONARY NODULE 1 CM OR GREATER IN DIAMETER: Primary | ICD-10-CM

## 2022-01-13 DIAGNOSIS — G47.33 OBSTRUCTIVE SLEEP APNEA: ICD-10-CM

## 2022-01-13 PROCEDURE — 99214 OFFICE O/P EST MOD 30 MIN: CPT | Performed by: INTERNAL MEDICINE

## 2022-01-13 RX ORDER — METHYLPREDNISOLONE 4 MG
TABLET, DOSE PACK ORAL
COMMUNITY

## 2022-01-13 RX ORDER — TRAMADOL HYDROCHLORIDE 50 MG/1
TABLET ORAL
COMMUNITY

## 2022-01-13 RX ORDER — AMOXICILLIN 500 MG
CAPSULE ORAL
COMMUNITY
End: 2022-06-13

## 2022-01-13 RX ORDER — MAGNESIUM OXIDE 400 MG/1
TABLET ORAL
COMMUNITY

## 2022-01-13 RX ORDER — BUTALBITAL, ACETAMINOPHEN AND CAFFEINE 50; 325; 40 MG/1; MG/1; MG/1
TABLET ORAL
COMMUNITY
Start: 2021-12-05

## 2022-01-13 RX ORDER — MAGNESIUM CHLORIDE 64 MG
TABLET, DELAYED RELEASE (ENTERIC COATED) ORAL
COMMUNITY

## 2022-01-13 NOTE — PROGRESS NOTES
Primary Care Provider  Masood Braden APRN   Referring Provider  No ref. provider found    History of Presenting Illness  Kathryn More is a 81 y.o. female here for discussion of the recent PET scan and CT scans done on her left lower lobe density.  The PET scan demonstrated no evidence of FDG uptake of significance in the subhilar density.  Density still appear to be consistent with a possible pneumonic infiltrate.  She reports having had histoplasmosis in the past but this does not present as a diffuse area of multiple small densities consistent with a histoplasmosis infection.  She does however say that she had CT scans done in [place] over 10 to 15 years ago.  They were done for approximately 3 years in a row and they demonstrated stability of a left lower lobe density.  At this point, I believe it would be in the patient's best interest to try to obtain those old records for comparison.  If we can demonstrate stability of the density in the left subhilar region 15 years, then she will no longer require any further CT scans or PET scans.  She reports having had the studies done at [facility] before it became the hospital for the [facility].    I have personally reviewed the review of systems, past family, social, medical and surgical histories; and agree with their findings.    Review of Systems  Constitutional symptoms:  Denied complaints   Ear, nose, throat: Denied complaints  Cardiovascular:  Denied complaints  Respiratory: Denied complaints  Gastrointestinal: Denied complaints  Musculoskeletal: Denied complaints    History reviewed. No pertinent family history.     Social History     Socioeconomic History   • Marital status:    Tobacco Use   • Smoking status: Never Smoker   • Smokeless tobacco: Never Used   Vaping Use   • Vaping Use: Never used   Substance and Sexual Activity   • Alcohol use: Never   • Drug use: Defer   • Sexual activity: Defer        Past Medical History:    Diagnosis Date   • Arthritis    • Disease of thyroid gland     parathyroid nodules   • Emphysema of lung (HCC)    • GERD (gastroesophageal reflux disease)    • Hyperlipidemia    • Hypertension    • Lung nodules    • Macular degeneration    • PONV (postoperative nausea and vomiting)    • Skin cancer    • Sleep apnea     uses c pap   • Vertigo         Immunization History   Administered Date(s) Administered   • Fluad Quad 65+ 09/27/2021   • Pneumococcal Polysaccharide (PPSV23) 10/07/2002, 10/15/2020   • Td 01/13/1999       Allergies   Allergen Reactions   • Biaxin [Clarithromycin] GI Intolerance     Terrible stomach pain   • Epinephrine Unknown - High Severity     syncope   • Meperidine Nausea Only   • Lidocaine-Epinephrine Dizziness   • Procaine Unknown - High Severity          Current Outpatient Medications:   •  Aflibercept (Eylea) 2 MG/0.05ML solution, by Intravitreal route Every 2 (Two) Months., Disp: , Rfl:   •  aspirin 81 MG chewable tablet, Chew 1 tablet Daily. Last dose 10/04/21 per pt, Disp: , Rfl:   •  butalbital-acetaminophen-caffeine (FIORICET, ESGIC) -40 MG per tablet, TAKE 1 TABLET BY MOUTH THREE TIMES DAILY AS NEEDED FOR PAIN OR HEADACHE, Disp: , Rfl:   •  Coenzyme Q10 (COQ-10 PO), Take 1 capsule by mouth Daily., Disp: , Rfl:   •  Glucosamine Sulfate 500 MG tablet, , Disp: , Rfl:   •  Glucosamine-Chondroitin 250-200 MG tablet, , Disp: , Rfl:   •  HYDROcodone-acetaminophen (NORCO) 7.5-325 MG per tablet, Take 1-2 tablets by mouth Every 4 (Four) Hours As Needed for Moderate Pain  (Pain)., Disp: 20 tablet, Rfl: 0  •  ibuprofen (ADVIL,MOTRIN) 600 MG tablet, Take 1 tablet by mouth Every 6 (Six) Hours As Needed for Mild Pain ., Disp: , Rfl:   •  magnesium chloride ER 64 MG DR tablet, Take  by mouth., Disp: , Rfl:   •  magnesium cl-calcium carbonate (SLOW-MAG) 71.5-119 MG tablet delayed-release tablet, , Disp: , Rfl:   •  magnesium oxide (MAG-OX) 400 MG tablet, magnesium oxide 400 mg magnesium oral  "tablet take 1 tablet by oral route daily   Active, Disp: , Rfl:   •  meclizine (ANTIVERT) 25 MG tablet, Take 25 mg by mouth 3 (Three) Times a Day As Needed for Dizziness., Disp: , Rfl:   •  Multiple Vitamins-Minerals (PRESERVISION AREDS 2 PO), Take  by mouth., Disp: , Rfl:   •  multivitamin (THERAGRAN) tablet tablet, Take  by mouth Daily. One a day vitamin, Disp: , Rfl:   •  nitroglycerin (NITROSTAT) 0.4 MG SL tablet, Place 0.4 mg under the tongue Every 5 (Five) Minutes As Needed for Chest Pain. Take no more than 3 doses in 15 minutes., Disp: , Rfl:   •  Omega-3 Fatty Acids (fish oil) 1200 MG capsule capsule, Take  by mouth., Disp: , Rfl:   •  omeprazole (priLOSEC) 20 MG capsule, , Disp: , Rfl:   •  Probiotic Product (PROBIOTIC ADVANCED PO), Take 1 capsule by mouth Daily., Disp: , Rfl:   •  rosuvastatin (CRESTOR) 5 MG tablet, Take 5 mg by mouth Every Night., Disp: , Rfl:   •  sertraline (ZOLOFT) 50 MG tablet, Take 50 mg by mouth Every Night., Disp: , Rfl:   •  spironolactone (ALDACTONE) 25 MG tablet, Take 25 mg by mouth Every Night., Disp: , Rfl:   •  traMADol (ULTRAM) 50 MG tablet, tramadol 50 mg oral tablet take 1 tablet (50 mg) by oral route every 4 hours as needed   Active, Disp: , Rfl:   •  valsartan (DIOVAN) 160 MG tablet, Take 160 mg by mouth Every Night., Disp: , Rfl:   •  valsartan (DIOVAN) 80 MG tablet, Take 80 mg by mouth Every Night. Takes along with 160mg tab, Disp: , Rfl:   •  magnesium oxide (MAGOX) 400 (241.3 Mg) MG tablet tablet, Take 400 mg by mouth Daily., Disp: , Rfl:      Objective     Vital Signs:   /64 (BP Location: Left arm, Patient Position: Sitting, Cuff Size: Adult)   Pulse 99   Temp 97.3 °F (36.3 °C) (Temporal)   Resp 18   Ht 157.5 cm (62\")   Wt 76.2 kg (168 lb)   SpO2 97% Comment: room air  BMI 30.73 kg/m²       Physical Exam  Vital Signs Reviewed   General: She is a very pleasant woman and provided a very clear history.  She is 81 years of age but appears to be " younger.  HEENT: Her facial features were rather gaunt.  She had no unusual lesions.  Eye examination appeared to be unremarkable.  Auditory canals and nasal passages were unremarkable.  Oral mucosa were moist and she had a class II size oropharyngeal airway.  Her neck is thin and somewhat atrophic.  No palpable adenopathy.  Carotid upstrokes are 2+ bilaterally without bruits.  She had a reasonable range of motion of her neck.  There is no palpable adenopathy in her neck or axillary regions.    Chest: Her chest was atrophic as well.  It was mildly hyperresonant to percussion.  Diaphragms were low and somewhat decreased mobility.  Breath sounds however were generally clear without rales wheezes rhonchi or consolidation.  Cardiac: PMI was nonpalpable.  Heart sounds were somewhat distant but regular without S3 S4s murmurs or rubs.  Jugular venous pulsations were not seen to be elevated above 6 to 7 cm.  Carotid upstrokes were 2+ bilaterally without bruits.  Radial pulses were normal.  Abdomen: I carried out a minimal examination of her abdomen.  It was nontender and bowel sounds were heard.  No tenderness or organomegaly was appreciated.  Extremities: Extremities were sent consistent with her age.  Musculature was decreased.  There is no joint erythema or effusions.  There is no cyanosis clubbing or edema in her fingers.  I did not have her remove her shoes to evaluate her toes.  Neuro: Cranial nerves II through XII are intact.  She is awake alert and oriented and provided a clear history  Gait and station were adequate for age.  Cranial nerves II through XII are intact.    Results  I have personally reviewed the the patient's CT scans and PET scans which have been undertaken and evaluated here over the last 6 months.     Assessment and Plan    Patient Active Problem List   Diagnosis   • Anxiety   • Essential hypertension   • Gastroesophageal reflux disease   • Sleep apnea   • Abnormal chest CT   • Chronic cough   •  Lung nodule   • Primary hyperparathyroidism (HCC)       There are no diagnoses linked to this encounter.  Impression:  1) left subhilar density (pulmonary nodule)  2) obstructive sleep apnea syndrome  3) gastroesophageal reflux disease    Plan:  1) we will attempt to obtain CT scans taken at [facility] 10 to 15 years ago for comparison to her current radiographs.  Hopefully this will document that this is a very chronic lesion and is not active and no longer needs any radiographic follow-up.  2) she will be rescheduled for follow-up in approximately 4 weeks to give us time to find her prior radiographs.    Smoking status: Lifelong non-smoker  Vaccination status: She is up-to-date on her flu vaccine, pneumococcal vaccine, and DPT.  She apparently has not had her COVID immunizations.    Medications personally reviewed.    Follow Up   Return in about 8 weeks (around 3/9/2022) for Recheck.  Patient was given instructions and counseling regarding her condition or for health maintenance advice. Please see specific information pulled into the AVS if appropriate.

## 2022-03-08 ENCOUNTER — APPOINTMENT (OUTPATIENT)
Dept: CT IMAGING | Facility: HOSPITAL | Age: 82
End: 2022-03-08

## 2022-03-28 ENCOUNTER — TRANSCRIBE ORDERS (OUTPATIENT)
Dept: ADMINISTRATIVE | Facility: HOSPITAL | Age: 82
End: 2022-03-28

## 2022-03-28 ENCOUNTER — LAB (OUTPATIENT)
Dept: LAB | Facility: HOSPITAL | Age: 82
End: 2022-03-28

## 2022-03-28 DIAGNOSIS — E03.9 HYPOTHYROIDISM, UNSPECIFIED TYPE: Primary | ICD-10-CM

## 2022-03-28 DIAGNOSIS — E03.9 HYPOTHYROIDISM, UNSPECIFIED TYPE: ICD-10-CM

## 2022-03-28 LAB
T4 FREE SERPL-MCNC: 1.27 NG/DL (ref 0.93–1.7)
TSH SERPL DL<=0.05 MIU/L-ACNC: 1.09 UIU/ML (ref 0.27–4.2)

## 2022-03-28 PROCEDURE — 84439 ASSAY OF FREE THYROXINE: CPT

## 2022-03-28 PROCEDURE — 36415 COLL VENOUS BLD VENIPUNCTURE: CPT

## 2022-03-28 PROCEDURE — 84443 ASSAY THYROID STIM HORMONE: CPT

## 2022-03-30 PROCEDURE — U0004 COV-19 TEST NON-CDC HGH THRU: HCPCS | Performed by: NURSE PRACTITIONER

## 2022-03-30 PROCEDURE — 87086 URINE CULTURE/COLONY COUNT: CPT | Performed by: NURSE PRACTITIONER

## 2022-04-01 PROBLEM — U07.1 CLINICAL DIAGNOSIS OF SEVERE ACUTE RESPIRATORY SYNDROME CORONAVIRUS 2 (SARS-COV-2) DISEASE: Status: ACTIVE | Noted: 2022-04-01

## 2022-04-01 RX ORDER — DIPHENHYDRAMINE HCL 25 MG
50 TABLET ORAL ONCE AS NEEDED
Status: CANCELLED | OUTPATIENT
Start: 2022-04-04

## 2022-04-01 RX ORDER — METHYLPREDNISOLONE SODIUM SUCCINATE 125 MG/2ML
125 INJECTION, POWDER, LYOPHILIZED, FOR SOLUTION INTRAMUSCULAR; INTRAVENOUS AS NEEDED
Status: CANCELLED | OUTPATIENT
Start: 2022-04-04

## 2022-04-01 RX ORDER — BEBTELOVIMAB 87.5 MG/ML
175 INJECTION, SOLUTION INTRAVENOUS ONCE
Status: CANCELLED | OUTPATIENT
Start: 2022-04-04

## 2022-04-01 RX ORDER — SODIUM CHLORIDE 9 MG/ML
30 INJECTION, SOLUTION INTRAVENOUS ONCE
Status: CANCELLED | OUTPATIENT
Start: 2022-04-04 | End: 2022-04-04

## 2022-04-01 RX ORDER — DIPHENHYDRAMINE HYDROCHLORIDE 50 MG/ML
50 INJECTION INTRAMUSCULAR; INTRAVENOUS ONCE AS NEEDED
Status: CANCELLED | OUTPATIENT
Start: 2022-04-04

## 2022-04-01 RX ORDER — EPINEPHRINE 1 MG/ML
0.3 INJECTION, SOLUTION INTRAMUSCULAR; SUBCUTANEOUS AS NEEDED
Status: CANCELLED | OUTPATIENT
Start: 2022-04-04

## 2022-04-04 ENCOUNTER — HOSPITAL ENCOUNTER (OUTPATIENT)
Dept: INFUSION THERAPY | Facility: HOSPITAL | Age: 82
Discharge: HOME | End: 2022-04-04

## 2022-04-04 DIAGNOSIS — U07.1 CLINICAL DIAGNOSIS OF SEVERE ACUTE RESPIRATORY SYNDROME CORONAVIRUS 2 (SARS-COV-2) DISEASE: Primary | ICD-10-CM

## 2022-04-04 RX ORDER — DIPHENHYDRAMINE HCL 25 MG
50 CAPSULE ORAL ONCE AS NEEDED
Status: CANCELLED | OUTPATIENT
Start: 2022-04-04

## 2022-04-04 RX ORDER — SODIUM CHLORIDE 9 MG/ML
30 INJECTION, SOLUTION INTRAVENOUS ONCE
Status: CANCELLED | OUTPATIENT
Start: 2022-04-04 | End: 2022-04-04

## 2022-04-04 RX ORDER — BEBTELOVIMAB 87.5 MG/ML
175 INJECTION, SOLUTION INTRAVENOUS ONCE
Status: DISCONTINUED | OUTPATIENT
Start: 2022-04-04 | End: 2022-04-06 | Stop reason: HOSPADM

## 2022-04-04 RX ORDER — SODIUM CHLORIDE 9 MG/ML
30 INJECTION, SOLUTION INTRAVENOUS ONCE
Status: DISCONTINUED | OUTPATIENT
Start: 2022-04-04 | End: 2022-04-06 | Stop reason: HOSPADM

## 2022-04-04 RX ORDER — METHYLPREDNISOLONE SODIUM SUCCINATE 125 MG/2ML
125 INJECTION, POWDER, LYOPHILIZED, FOR SOLUTION INTRAMUSCULAR; INTRAVENOUS AS NEEDED
Status: CANCELLED | OUTPATIENT
Start: 2022-04-04

## 2022-04-04 RX ORDER — EPINEPHRINE 1 MG/ML
0.3 INJECTION, SOLUTION, CONCENTRATE INTRAVENOUS AS NEEDED
Status: CANCELLED | OUTPATIENT
Start: 2022-04-04

## 2022-04-04 RX ORDER — BEBTELOVIMAB 87.5 MG/ML
175 INJECTION, SOLUTION INTRAVENOUS ONCE
Status: CANCELLED | OUTPATIENT
Start: 2022-04-04

## 2022-04-04 RX ORDER — DIPHENHYDRAMINE HYDROCHLORIDE 50 MG/ML
50 INJECTION INTRAMUSCULAR; INTRAVENOUS ONCE AS NEEDED
Status: CANCELLED | OUTPATIENT
Start: 2022-04-04

## 2022-05-25 NOTE — DISCHARGE INSTRUCTIONS
IMPORTANT INSTRUCTIONS - PRE-ADMISSION TESTING  1. DO NOT EAT OR CHEW anything after midnight the night before your procedure.    2. You may have CLEAR liquids up to _2_ hours prior to ARRIVAL time.   3. Take the following medications the morning of your procedure with JUST A SIP OF WATER:  __doxycline______________________________________________________________________________________________________________________________________________________________________    4. DO NOT BRING your medications to the hospital with you, UNLESS something has changed since your PRE-Admission Testing appointment.  5. Hold all vitamins, supplements, and NSAIDS (Non- steroidal anti-inflammatory meds) for one week prior to surgery (you MAY take Tylenol or Acetaminophen).  6. If you are diabetic, check your blood sugar the morning of your procedure. If it is less than 70 or if you are feeling symptomatic, call the following number for further instructions: [phone]__.  7. Use your inhalers/nebulizers as usual, the morning of your procedure. BRING YOUR INHALERS with you.   8. Bring your CPAP or BIPAP to hospital, ONLY IF YOU WILL BE SPENDING THE NIGHT.   9. Make sure you have a ride home and have someone who will stay with you the day of your procedure after you go home.  10. If you have any questions, please call your Pre-Admission Testing Nurse, ALVERTO__ at [phone]___.   11. Per anesthesia request, do not smoke for 24 hours before your procedure or as instructed by your surgeon.      ••••••Clear Liquid Diet        Find out when you need to start a clear liquid diet.   Think of “clear liquids” as anything you could read a newspaper through. This includes things like water, broth, sports drinks, or tea WITHOUT any kind of milk or cream.           Once you are told to start a clear liquid diet, only drink these things until 2 hours before arrival to the hospital or when the hospital says to stop. Total volume limitation: 8  Kerry Edmondson is a 62 year old female here for  Chief Complaint   Patient presents with   • Cancer     Denies latex allergy or sensitivity.    Medication verified, no changes.  PCP and Pharmacy verified.    Social History     Tobacco Use   Smoking Status Current Every Day Smoker   • Packs/day: 0.50   • Start date: 1975   Smokeless Tobacco Never Used     Advance Directives Filed: No    ECOG:   ECOG [05/25/22 1010]   ECOG Performance Status 2       Vitals:    Visit Vitals  BP (!) 180/81   Pulse (!) 115   Temp 98.1 °F (36.7 °C) (Temporal)   Resp 16   Wt 44 kg (97 lb)   SpO2 98%   BMI 17.74 kg/m²       These vital signs are:  Within defined parameters (Per Reference \"Defined Limits Hospital Outpatient Department (HOD)\")    Height: No.  Ht Readings from Last 1 Encounters:   05/23/22 5' 2\" (1.575 m)     Weight:Yes, shoes on.  Wt Readings from Last 3 Encounters:   05/25/22 44 kg (97 lb)   05/24/22 45.3 kg (99 lb 13.9 oz)   05/23/22 47.6 kg (105 lb)       BMI: Body mass index is 17.74 kg/m².    REVIEW OF SYSTEMS  GENERAL:  Patient denies headache, fevers, chills, night sweats, dizziness, but complains of: excessive fatigue, change in appetite and weight loss  ALLERGIC/IMMUNOLOGIC: Verified allergies: Yes  EYES:  Patient denies significant visual difficulties, double vision, blurred vision  ENT/MOUTH: Patient denies problems with hearing, sore throat, sinus drainage, mouth sores  ENDOCRINE:  Patient denies diabetes, thyroid disease, hormone replacement, hot flashes  HEMATOLOGIC/LYMPHATIC: Patient denies easy bruising, bleeding, tender lymph nodes, swollen lymph nodes  BREASTS: Patient denies abnormal masses of breast, nipple discharge, pain  RESPIRATORY:  Patient denies lung pain with breathing, cough, coughing up blood, shortness of breath  CARDIOVASCULAR:  Patient denies anginal chest pain, palpitations, shortness of breath when lying flat, peripheral edema  GASTROINTESTINAL: Patient denies abdominal pain , vomiting,  diarrhea, GI bleeding, constipation, change in bowel habits, heartburn, sensation of feeling full, difficulty swallowing, but complains of: nausea  : Patient denies abnormal genital masses, blood in the urine, frequency, urgency, burning with urination, hesitancy, incontinence, vaginal bleeding, discharge  MUSCULOSKELETAL:  Patient denies joint pain, joint swelling, redness, decreased range of motion, but complains of: bone pain, pain rating: 10, location: back   SKIN:  Patient denies chronic rashes, inflammation, ulcerations, skin changes, itching  NEUROLOGIC:  Patient denies loss of balance, abnormal gait, sensory problems, numbness, tingling, but complains of: areas of focal weakness  PSYCHIATRIC: Patient denies but complains of: insomnia, depression and anxiety    This patient reported abnormal symptoms that needed immediate verbal communication: No     oz.       Clear liquids you CAN drink:   ; Water   ; Clear broth: beef, chicken, vegetable, or bone broth with nothing in it   ; Gatorade   ; Lemonade or Coy-aid   ; Soda   ; Tea, coffee (NO cream or honey)   ; Jell-O (without fruit)   ; Popsicles (without fruit or cream)   ; Italian ices   ; Juice without pulp: apple, white, grape   ; You may use salt, pepper, and sugar    Do NOT drink:   ; Milk or cream   ; Soy milk, almond milk, coconut milk, or other non-dairy drinks and   creamers   ; Milkshakes or smoothies   ; Tomato juice   ; Orange juice   ; Grapefruit juice   ; Cream soups or any other than broth         Clear Liquid Diet:  ? Do NOT eat any solid food.  ? Do NOT eat or suck on mints or candy.  ? Do NOT chew gum.  ? Do NOT drink thick liquids like milk or juice with pulp in it.  ? Do NOT add milk, cream, or anything like soy milk or almond milk to coffee or tea.       PREOPERATIVE (BEFORE SURGERY)              BATHING INSTRUCTIONS  Instructions:   • You will need to shower 1   time utilizing the soap provided; at the times indicated   below:     Morning of surgery       • Wash your hair and face with normal shampoo and soap, rinse it well before using the surgical soap.     • In the shower, wet the skin completely with water from your neck to your feet. Apply the cleanser to your   body ONLY FROM THE NECK TO YOUR FEET.    • Do NOT USE THE CLEANSER ON YOUR FACE, HEAD, OR GENITAL (PRIVATE) AREAS.   Keep it out of your eyes, ears, and mouth because of the risk of injury to those areas.     • Scrub with a clean washcloth for each bath utilizing the soap provided from the top of your body to the   bottom starting at the neck area.     • Pay close attention to your armpits, groin area, and the site of surgery.     • Wash your body gently for 5 minutes. Stand outside the stream or turn off the water while scrubbing your   body. Do NOT wash with your regular soap after the surgical cleanser is used.     • RINSE  THE CLEANSER OFF COMPLETELY with plenty of water. Rinse the area again thoroughly.     • Dry off with a clean towel. The surgical soap can cause dryness; however do NOT APPLY LOTION,   CREAM, POWDER, and/or DEODORANT AFTER SHOWERING.    • Be sure to where clean clothes after showering.     • Ensure CLEAN BED LINENS AFTER FIRST wash with the surgical soap.     • NO PETS ALLOWED IN THE BED with you after utilizing the surgical soap.

## 2022-08-08 PROCEDURE — U0004 COV-19 TEST NON-CDC HGH THRU: HCPCS | Performed by: FAMILY MEDICINE

## 2022-08-12 ENCOUNTER — TRANSCRIBE ORDERS (OUTPATIENT)
Dept: ADMINISTRATIVE | Facility: HOSPITAL | Age: 82
End: 2022-08-12

## 2022-08-12 ENCOUNTER — LAB (OUTPATIENT)
Dept: LAB | Facility: HOSPITAL | Age: 82
End: 2022-08-12

## 2022-08-12 DIAGNOSIS — R53.83 MALAISE AND FATIGUE: ICD-10-CM

## 2022-08-12 DIAGNOSIS — E03.9 HYPOTHYROIDISM, ADULT: ICD-10-CM

## 2022-08-12 DIAGNOSIS — R53.81 MALAISE AND FATIGUE: ICD-10-CM

## 2022-08-12 DIAGNOSIS — E03.9 HYPOTHYROIDISM, ADULT: Primary | ICD-10-CM

## 2022-08-12 LAB
25(OH)D3 SERPL-MCNC: 39.1 NG/ML (ref 30–100)
ALBUMIN SERPL-MCNC: 4.4 G/DL (ref 3.5–5.2)
ALBUMIN/GLOB SERPL: 1.6 G/DL
ALP SERPL-CCNC: 85 U/L (ref 39–117)
ALT SERPL W P-5'-P-CCNC: 23 U/L (ref 1–33)
ANION GAP SERPL CALCULATED.3IONS-SCNC: 10.5 MMOL/L (ref 5–15)
AST SERPL-CCNC: 21 U/L (ref 1–32)
BASOPHILS # BLD AUTO: 0.02 10*3/MM3 (ref 0–0.2)
BASOPHILS NFR BLD AUTO: 0.4 % (ref 0–1.5)
BILIRUB SERPL-MCNC: 0.2 MG/DL (ref 0–1.2)
BUN SERPL-MCNC: 21 MG/DL (ref 8–23)
BUN/CREAT SERPL: 22.3 (ref 7–25)
CALCIUM SPEC-SCNC: 9.8 MG/DL (ref 8.6–10.5)
CHLORIDE SERPL-SCNC: 102 MMOL/L (ref 98–107)
CO2 SERPL-SCNC: 25.5 MMOL/L (ref 22–29)
CREAT SERPL-MCNC: 0.94 MG/DL (ref 0.57–1)
DEPRECATED RDW RBC AUTO: 39.4 FL (ref 37–54)
EGFRCR SERPLBLD CKD-EPI 2021: 60.7 ML/MIN/1.73
EOSINOPHIL # BLD AUTO: 0.17 10*3/MM3 (ref 0–0.4)
EOSINOPHIL NFR BLD AUTO: 3.1 % (ref 0.3–6.2)
ERYTHROCYTE [DISTWIDTH] IN BLOOD BY AUTOMATED COUNT: 12.9 % (ref 12.3–15.4)
FSH SERPL-ACNC: 111 MIU/ML
GLOBULIN UR ELPH-MCNC: 2.8 GM/DL
GLUCOSE SERPL-MCNC: 73 MG/DL (ref 65–99)
HBA1C MFR BLD: 6 % (ref 3.5–5.6)
HCT VFR BLD AUTO: 38.1 % (ref 34–46.6)
HGB BLD-MCNC: 12.7 G/DL (ref 12–15.9)
HOLD SPECIMEN: NORMAL
IMM GRANULOCYTES # BLD AUTO: 0.01 10*3/MM3 (ref 0–0.05)
IMM GRANULOCYTES NFR BLD AUTO: 0.2 % (ref 0–0.5)
LH SERPL-ACNC: 47.3 MIU/ML
LYMPHOCYTES # BLD AUTO: 1.88 10*3/MM3 (ref 0.7–3.1)
LYMPHOCYTES NFR BLD AUTO: 34.2 % (ref 19.6–45.3)
MCH RBC QN AUTO: 28.2 PG (ref 26.6–33)
MCHC RBC AUTO-ENTMCNC: 33.3 G/DL (ref 31.5–35.7)
MCV RBC AUTO: 84.7 FL (ref 79–97)
MONOCYTES # BLD AUTO: 0.59 10*3/MM3 (ref 0.1–0.9)
MONOCYTES NFR BLD AUTO: 10.7 % (ref 5–12)
NEUTROPHILS NFR BLD AUTO: 2.83 10*3/MM3 (ref 1.7–7)
NEUTROPHILS NFR BLD AUTO: 51.4 % (ref 42.7–76)
NRBC BLD AUTO-RTO: 0 /100 WBC (ref 0–0.2)
PLATELET # BLD AUTO: 290 10*3/MM3 (ref 140–450)
PMV BLD AUTO: 9.8 FL (ref 6–12)
POTASSIUM SERPL-SCNC: 4.3 MMOL/L (ref 3.5–5.2)
PROGEST SERPL-MCNC: <0.05 NG/ML
PROLACTIN SERPL-MCNC: 8.6 NG/ML (ref 4.79–23.3)
PROT SERPL-MCNC: 7.2 G/DL (ref 6–8.5)
RBC # BLD AUTO: 4.5 10*6/MM3 (ref 3.77–5.28)
SODIUM SERPL-SCNC: 138 MMOL/L (ref 136–145)
T3FREE SERPL-MCNC: 2.76 PG/ML (ref 2–4.4)
T4 FREE SERPL-MCNC: 1.34 NG/DL (ref 0.93–1.7)
TSH SERPL DL<=0.05 MIU/L-ACNC: 0.68 UIU/ML (ref 0.27–4.2)
WBC NRBC COR # BLD: 5.5 10*3/MM3 (ref 3.4–10.8)

## 2022-08-12 PROCEDURE — 80053 COMPREHEN METABOLIC PANEL: CPT

## 2022-08-12 PROCEDURE — 36415 COLL VENOUS BLD VENIPUNCTURE: CPT

## 2022-08-12 PROCEDURE — 83036 HEMOGLOBIN GLYCOSYLATED A1C: CPT

## 2022-08-12 PROCEDURE — 84402 ASSAY OF FREE TESTOSTERONE: CPT

## 2022-08-12 PROCEDURE — 84439 ASSAY OF FREE THYROXINE: CPT

## 2022-08-12 PROCEDURE — 82306 VITAMIN D 25 HYDROXY: CPT

## 2022-08-12 PROCEDURE — 84481 FREE ASSAY (FT-3): CPT

## 2022-08-12 PROCEDURE — 84144 ASSAY OF PROGESTERONE: CPT

## 2022-08-12 PROCEDURE — 84403 ASSAY OF TOTAL TESTOSTERONE: CPT

## 2022-08-12 PROCEDURE — 84443 ASSAY THYROID STIM HORMONE: CPT

## 2022-08-12 PROCEDURE — 84146 ASSAY OF PROLACTIN: CPT

## 2022-08-12 PROCEDURE — 85025 COMPLETE CBC W/AUTO DIFF WBC: CPT

## 2022-08-12 PROCEDURE — 83002 ASSAY OF GONADOTROPIN (LH): CPT

## 2022-08-12 PROCEDURE — 83001 ASSAY OF GONADOTROPIN (FSH): CPT

## 2022-08-17 LAB
TESTOST FREE SERPL-MCNC: <0.2 PG/ML (ref 0–4.2)
TESTOST SERPL-MCNC: <3 NG/DL (ref 2–45)

## 2022-08-22 ENCOUNTER — TRANSCRIBE ORDERS (OUTPATIENT)
Dept: ADMINISTRATIVE | Facility: HOSPITAL | Age: 82
End: 2022-08-22

## 2022-08-22 ENCOUNTER — OFFICE VISIT (OUTPATIENT)
Dept: PULMONOLOGY | Facility: CLINIC | Age: 82
End: 2022-08-22

## 2022-08-22 VITALS
OXYGEN SATURATION: 93 % | RESPIRATION RATE: 18 BRPM | WEIGHT: 167 LBS | SYSTOLIC BLOOD PRESSURE: 128 MMHG | BODY MASS INDEX: 29.59 KG/M2 | HEIGHT: 63 IN | HEART RATE: 80 BPM | DIASTOLIC BLOOD PRESSURE: 82 MMHG | TEMPERATURE: 97.8 F

## 2022-08-22 DIAGNOSIS — R05.3 CHRONIC COUGH: ICD-10-CM

## 2022-08-22 DIAGNOSIS — R93.89 ABNORMAL CHEST CT: Primary | ICD-10-CM

## 2022-08-22 DIAGNOSIS — C73 MALIGNANT NEOPLASM OF THYROID GLAND: Primary | ICD-10-CM

## 2022-08-22 PROCEDURE — 99213 OFFICE O/P EST LOW 20 MIN: CPT | Performed by: INTERNAL MEDICINE

## 2022-08-22 NOTE — PROGRESS NOTES
"Chief Complaint  Sleep Apnea, Pulmonary nodule 1 cm or greater in diameter , Chronic Cough  (Mucus-light yellow sometimes- getting better then it was/), and Follow-up (8 week )    Subjective        Kathryn More presents to [facility]  History of Present Illness  82-year-old female here for follow-up  Had PET scan that showed resolving and improving consolidative opacities in the medial aspect of the right and left lower lobes  He is having present for several years now  Still with dry cough  However she does endorse reflux  Has undertreated sleep apnea  Objective   Vital Signs:  /82 (BP Location: Left arm, Patient Position: Sitting, Cuff Size: Large Adult)   Pulse 80   Temp 97.8 °F (36.6 °C) (Temporal)   Resp 18   Ht 160 cm (63\")   Wt 75.8 kg (167 lb)   SpO2 93% Comment: room air  BMI 29.58 kg/m²   Estimated body mass index is 29.58 kg/m² as calculated from the following:    Height as of this encounter: 160 cm (63\").    Weight as of this encounter: 75.8 kg (167 lb).          Physical Exam   Vital Signs Reviewed  General WDWN, Alert, NAD.    HEENT:  PERRL, EOMI.  OP, nares clear, no sinus tenderness  Neck:  Supple, no JVD, no thyromegaly  Lymph: no axillary, cervical, supraclavicular lymphadenopathy noted bilaterally  Chest:  good aeration, clear to auscultation bilaterally, tympanic to percussion bilaterally, no work of breathing noted  CV: RRR, no MGR, pulses 2+, equal.  Abd:  Soft, NT, ND, + BS, no HSM  EXT:  no clubbing, no cyanosis, no edema, no joint tenderness  Neuro:  A&Ox3, CN grossly intact, no focal deficits.  Skin: No rashes or lesions noted  Result Review :                Assessment and Plan   Diagnoses and all orders for this visit:    1. Abnormal chest CT (Primary)  Assessment & Plan:  Most recent PET scan showed improvement in the right lower lobe and left lower lobe medial consolidations    Most likely atelectasis and scarring " present and improving over the course the past several years    No further follow-up for this needed      2. Chronic cough  Assessment & Plan:  Has chronic cough with no worsening symptoms    Has persistent reflux and untreated sleep apnea    Suspect that the reflux worsened due to the untreated sleep apnea and reflux likely causing the cough    She describes as mild in nature             Follow Up   No follow-ups on file.  Patient was given instructions and counseling regarding her condition or for health maintenance advice. Please see specific information pulled into the AVS if appropriate.

## 2022-08-22 NOTE — ASSESSMENT & PLAN NOTE
Has chronic cough with no worsening symptoms    Has persistent reflux and untreated sleep apnea    Suspect that the reflux worsened due to the untreated sleep apnea and reflux likely causing the cough    She describes as mild in nature

## 2022-08-22 NOTE — ASSESSMENT & PLAN NOTE
Most recent PET scan showed improvement in the right lower lobe and left lower lobe medial consolidations    Most likely atelectasis and scarring present and improving over the course the past several years    No further follow-up for this needed

## 2022-08-31 ENCOUNTER — APPOINTMENT (OUTPATIENT)
Dept: CT IMAGING | Facility: HOSPITAL | Age: 82
End: 2022-08-31

## 2022-08-31 ENCOUNTER — HOSPITAL ENCOUNTER (EMERGENCY)
Facility: HOSPITAL | Age: 82
Discharge: HOME | End: 2022-08-31
Attending: EMERGENCY MEDICINE | Admitting: EMERGENCY MEDICINE

## 2022-08-31 ENCOUNTER — APPOINTMENT (OUTPATIENT)
Dept: GENERAL RADIOLOGY | Facility: HOSPITAL | Age: 82
End: 2022-08-31

## 2022-08-31 VITALS
HEIGHT: 63 IN | OXYGEN SATURATION: 97 % | TEMPERATURE: 98 F | SYSTOLIC BLOOD PRESSURE: 145 MMHG | RESPIRATION RATE: 16 BRPM | WEIGHT: 168 LBS | DIASTOLIC BLOOD PRESSURE: 79 MMHG | BODY MASS INDEX: 29.77 KG/M2 | HEART RATE: 72 BPM

## 2022-08-31 DIAGNOSIS — M54.12 CERVICAL RADICULOPATHY: ICD-10-CM

## 2022-08-31 DIAGNOSIS — M54.6 ACUTE LEFT-SIDED THORACIC BACK PAIN: Primary | ICD-10-CM

## 2022-08-31 LAB
ALBUMIN SERPL-MCNC: 4.66 G/DL (ref 3.5–5.2)
ALBUMIN/GLOB SERPL: 1.9 G/DL
ALP SERPL-CCNC: 95 U/L (ref 39–117)
ALT SERPL W P-5'-P-CCNC: 17 U/L (ref 1–33)
ANION GAP SERPL CALCULATED.3IONS-SCNC: 13.3 MMOL/L (ref 5–15)
AST SERPL-CCNC: 21 U/L (ref 1–32)
BASOPHILS # BLD AUTO: 0.06 10*3/MM3 (ref 0–0.2)
BASOPHILS NFR BLD AUTO: 0.8 % (ref 0–1.5)
BILIRUB SERPL-MCNC: 0.2 MG/DL (ref 0–1.2)
BUN SERPL-MCNC: 24 MG/DL (ref 8–23)
BUN/CREAT SERPL: 18.2 (ref 7–25)
CALCIUM SPEC-SCNC: 9.9 MG/DL (ref 8.6–10.5)
CHLORIDE SERPL-SCNC: 102 MMOL/L (ref 98–107)
CO2 SERPL-SCNC: 21.7 MMOL/L (ref 22–29)
CREAT SERPL-MCNC: 1.32 MG/DL (ref 0.57–1)
DEPRECATED RDW RBC AUTO: 43 FL (ref 37–54)
EGFRCR SERPLBLD CKD-EPI 2021: 40.4 ML/MIN/1.73
EOSINOPHIL # BLD AUTO: 0.23 10*3/MM3 (ref 0–0.4)
EOSINOPHIL NFR BLD AUTO: 3.2 % (ref 0.3–6.2)
ERYTHROCYTE [DISTWIDTH] IN BLOOD BY AUTOMATED COUNT: 13.5 % (ref 12.3–15.4)
GLOBULIN UR ELPH-MCNC: 2.4 GM/DL
GLUCOSE SERPL-MCNC: 104 MG/DL (ref 65–99)
HCT VFR BLD AUTO: 38.5 % (ref 34–46.6)
HGB BLD-MCNC: 12.6 G/DL (ref 12–15.9)
HOLD SPECIMEN: NORMAL
HOLD SPECIMEN: NORMAL
IMM GRANULOCYTES # BLD AUTO: 0.02 10*3/MM3 (ref 0–0.05)
IMM GRANULOCYTES NFR BLD AUTO: 0.3 % (ref 0–0.5)
LYMPHOCYTES # BLD AUTO: 2.41 10*3/MM3 (ref 0.7–3.1)
LYMPHOCYTES NFR BLD AUTO: 33.1 % (ref 19.6–45.3)
MCH RBC QN AUTO: 28.1 PG (ref 26.6–33)
MCHC RBC AUTO-ENTMCNC: 32.7 G/DL (ref 31.5–35.7)
MCV RBC AUTO: 85.7 FL (ref 79–97)
MONOCYTES # BLD AUTO: 0.66 10*3/MM3 (ref 0.1–0.9)
MONOCYTES NFR BLD AUTO: 9.1 % (ref 5–12)
NEUTROPHILS NFR BLD AUTO: 3.9 10*3/MM3 (ref 1.7–7)
NEUTROPHILS NFR BLD AUTO: 53.5 % (ref 42.7–76)
PLATELET # BLD AUTO: 299 10*3/MM3 (ref 140–450)
PMV BLD AUTO: 9.7 FL (ref 6–12)
POTASSIUM SERPL-SCNC: 4.4 MMOL/L (ref 3.5–5.2)
PROT SERPL-MCNC: 7.1 G/DL (ref 6–8.5)
QT INTERVAL: 378 MS
RBC # BLD AUTO: 4.49 10*6/MM3 (ref 3.77–5.28)
SODIUM SERPL-SCNC: 137 MMOL/L (ref 136–145)
TROPONIN T SERPL-MCNC: <0.01 NG/ML (ref 0–0.03)
WBC NRBC COR # BLD: 7.28 10*3/MM3 (ref 3.4–10.8)
WHOLE BLOOD HOLD COAG: NORMAL
WHOLE BLOOD HOLD SPECIMEN: NORMAL

## 2022-08-31 PROCEDURE — 96374 THER/PROPH/DIAG INJ IV PUSH: CPT

## 2022-08-31 PROCEDURE — 80053 COMPREHEN METABOLIC PANEL: CPT | Performed by: EMERGENCY MEDICINE

## 2022-08-31 PROCEDURE — 84484 ASSAY OF TROPONIN QUANT: CPT | Performed by: EMERGENCY MEDICINE

## 2022-08-31 PROCEDURE — 85025 COMPLETE CBC W/AUTO DIFF WBC: CPT | Performed by: EMERGENCY MEDICINE

## 2022-08-31 PROCEDURE — 93005 ELECTROCARDIOGRAM TRACING: CPT | Performed by: EMERGENCY MEDICINE

## 2022-08-31 PROCEDURE — 25010000002 KETOROLAC TROMETHAMINE PER 15 MG: Performed by: EMERGENCY MEDICINE

## 2022-08-31 PROCEDURE — 99282 EMERGENCY DEPT VISIT SF MDM: CPT | Performed by: EMERGENCY MEDICINE

## 2022-08-31 PROCEDURE — 99283 EMERGENCY DEPT VISIT LOW MDM: CPT

## 2022-08-31 RX ORDER — METHOCARBAMOL 500 MG/1
500 TABLET, FILM COATED ORAL 2 TIMES DAILY PRN
Qty: 20 TABLET | Refills: 0 | OUTPATIENT
Start: 2022-08-31 | End: 2022-09-19

## 2022-08-31 RX ORDER — KETOROLAC TROMETHAMINE 15 MG/ML
15 INJECTION, SOLUTION INTRAMUSCULAR; INTRAVENOUS ONCE
Status: COMPLETED | OUTPATIENT
Start: 2022-08-31 | End: 2022-08-31

## 2022-08-31 RX ORDER — ONDANSETRON 2 MG/ML
4 INJECTION INTRAMUSCULAR; INTRAVENOUS ONCE
Status: DISCONTINUED | OUTPATIENT
Start: 2022-08-31 | End: 2022-08-31

## 2022-08-31 RX ORDER — MORPHINE SULFATE 2 MG/ML
2 INJECTION, SOLUTION INTRAMUSCULAR; INTRAVENOUS ONCE
Status: DISCONTINUED | OUTPATIENT
Start: 2022-08-31 | End: 2022-08-31

## 2022-08-31 RX ORDER — LIDOCAINE 50 MG/G
1 PATCH TOPICAL EVERY 24 HOURS
Qty: 30 EACH | Refills: 0 | OUTPATIENT
Start: 2022-08-31 | End: 2022-09-19

## 2022-08-31 RX ORDER — SODIUM CHLORIDE 0.9 % (FLUSH) 0.9 %
10 SYRINGE (ML) INJECTION AS NEEDED
Status: DISCONTINUED | OUTPATIENT
Start: 2022-08-31 | End: 2022-08-31 | Stop reason: HOSPADM

## 2022-08-31 RX ADMIN — KETOROLAC TROMETHAMINE 15 MG: 15 INJECTION, SOLUTION INTRAMUSCULAR; INTRAVENOUS at 20:36

## 2022-09-01 NOTE — ED PROVIDER NOTES
"Subjective   Chief complaint: Left \"shoulder pain\" started 2 hours ago accompanied with indigestion.    HPI: 82-year-old female who presents to our department tonight complaining of severe 8/10 pain in her left shoulder area.  Specifically she is pointing to the left upper trapezius area.  The pain is not reproducible.  Patient's had no trauma.  Review of her past history indicates that she had a negative heart cath in March 2021, she had a echocardiogram at that time to which is showed mild to trace tricuspid and aortic regurg.  Patient denies palpitations.  Also denies nausea, vomiting, shortness of breath, diaphoresis.  She took ibuprofen and aspirin at home with no relief in his discomfort.          Review of Systems   Constitutional: Positive for fatigue. Negative for diaphoresis.   HENT: Negative for ear discharge, ear pain, nosebleeds, sinus pressure and sinus pain.    Cardiovascular: Negative for chest pain, palpitations and leg swelling.   Gastrointestinal: Negative for abdominal distention and nausea.   Musculoskeletal: Positive for arthralgias (Left trapezius area upper). Negative for myalgias, neck pain and neck stiffness.   Neurological: Negative for dizziness.       Past Medical History:   Diagnosis Date   • Arthritis    • Disease of thyroid gland     parathyroid nodules   • Emphysema of lung (HCC)    • GERD (gastroesophageal reflux disease)    • Hyperlipidemia    • Hypertension    • Lung nodules    • Macular degeneration    • PONV (postoperative nausea and vomiting)    • Skin cancer    • Sleep apnea     uses c pap   • Vertigo        Allergies   Allergen Reactions   • Biaxin [Clarithromycin] GI Intolerance     Terrible stomach pain   • Epinephrine Unknown - High Severity     syncope   • Meperidine Nausea Only   • Lidocaine-Epinephrine Dizziness   • Procaine Unknown - High Severity       Past Surgical History:   Procedure Laterality Date   • BREAST SURGERY      Jonatan breast bx's  benign   • CARDIAC " CATHETERIZATION     • CHOLECYSTECTOMY     • DILATATION AND CURETTAGE      x2   • EYE SURGERY      cataracts   • SKIN CANCER EXCISION      Face   • THYROIDECTOMY Bilateral 10/12/2021    Procedure: NECK EXPLORATION WITH LEFT PARATHYROID ADENOMA EXCISION AND FROZEN SECTION, INTRAOPERATIVE INTACT PTH ASSAY, RIGHT THYROIDECTOMY WITH FROZEN SECTION; LEFT PARTIAL THYROIDECTOMY; RECURRENT LARYNGEAL NERVE MONITORING;  Surgeon: Kenji Farley MD;  Location: [facility] OR;  Service: ENT;  Laterality: Bilateral;       History reviewed. No pertinent family history.    Social History     Socioeconomic History   • Marital status:    Tobacco Use   • Smoking status: Never Smoker   • Smokeless tobacco: Never Used   Vaping Use   • Vaping Use: Never used   Substance and Sexual Activity   • Alcohol use: Never   • Drug use: Defer   • Sexual activity: Defer           Objective   Physical Exam  Vitals and nursing note reviewed.   Constitutional:       Appearance: She is well-developed.   HENT:      Head: Atraumatic.   Eyes:      Extraocular Movements: Extraocular movements intact.      Pupils: Pupils are equal, round, and reactive to light.   Cardiovascular:      Rate and Rhythm: Normal rate and regular rhythm.      Heart sounds: Normal heart sounds. No murmur heard.  Pulmonary:      Breath sounds: Normal breath sounds.   Chest:      Chest wall: No mass.   Abdominal:      General: There is no abdominal bruit.      Palpations: There is no fluid wave.   Musculoskeletal:      Cervical back: Neck supple.      Right lower leg: No edema.      Left lower leg: No edema.   Skin:     Coloration: Skin is not cyanotic.   Neurological:      General: No focal deficit present.      Mental Status: She is alert.      Cranial Nerves: No cranial nerve deficit.   Psychiatric:         Mood and Affect: Mood normal.         Behavior: Behavior is not agitated.         Procedures           ED Course  ED Course as of 09/01/22 0057   Wed Aug 31, 2022   1943  EKG obtained at 1932.  Normal sinus rhythm, rate 68, normal axis, normal intervals, normal ST segments and T waves.  Comparison Is not available or provided.  This EKG was performed contemporaneously by me. []   1958 I reviewed the patient's past medical history that was available on the chart here.  She had a heart catheterization in June 2021 which was free of significant coronary disease.  She is also had carotid Dopplers which showed no evidence of atherosclerotic plaque.  Also had a recent echocardiogram which demonstrated mild diastolic type dysfunction and mild valvular heart disease. []   2021 XR Chest 1 View  Patient refused all imaging studies including chest x-ray and CT scan. []      ED Course User Index  [] Gabriel Salgado MD      Lab Results (last 24 hours)     Procedure Component Value Units Date/Time    CBC & Differential [[id number]]  (Normal) Collected: 08/31/22 1942    Specimen: Blood Updated: 08/31/22 2014    Narrative:      The following orders were created for panel order CBC & Differential.  Procedure                               Abnormality         Status                     ---------                               -----------         ------                     CBC Auto Differential[[id number]]        Normal              Final result                 Please view results for these tests on the individual orders.    Comprehensive Metabolic Panel [[id number]]  (Abnormal) Collected: 08/31/22 1942    Specimen: Blood Updated: 08/31/22 2031     Glucose 104 mg/dL      BUN 24 mg/dL      Creatinine 1.32 mg/dL      Sodium 137 mmol/L      Potassium 4.4 mmol/L      Chloride 102 mmol/L      CO2 21.7 mmol/L      Calcium 9.9 mg/dL      Total Protein 7.1 g/dL      Albumin 4.66 g/dL      ALT (SGPT) 17 U/L      AST (SGOT) 21 U/L      Alkaline Phosphatase 95 U/L      Total Bilirubin 0.2 mg/dL      Globulin 2.4 gm/dL      A/G Ratio 1.9 g/dL      BUN/Creatinine Ratio 18.2     Anion Gap 13.3 mmol/L      eGFR  40.4 mL/min/1.73      Comment: National Kidney Foundation and American Society of Nephrology (ASN) Task Force recommended calculation based on the Chronic Kidney Disease Epidemiology Collaboration (CKD-EPI) equation refit without adjustment for race.       Narrative:      GFR Normal >60  Chronic Kidney Disease <60  Kidney Failure <15      Troponin [[id number]]  (Normal) Collected: 08/31/22 1942    Specimen: Blood Updated: 08/31/22 2031     Troponin T <0.010 ng/mL     Narrative:      Troponin T Reference Range:  <= 0.03 ng/mL-   Negative for AMI  >0.03 ng/mL-     Abnormal for myocardial necrosis.  Clinicians would have to utilize clinical acumen, EKG, Troponin and serial changes to determine if it is an Acute Myocardial Infarction or myocardial injury due to an underlying chronic condition.       Results may be falsely decreased if patient taking Biotin.      CBC Auto Differential [[id number]]  (Normal) Collected: 08/31/22 1942    Specimen: Blood Updated: 08/31/22 2014     WBC 7.28 10*3/mm3      RBC 4.49 10*6/mm3      Hemoglobin 12.6 g/dL      Hematocrit 38.5 %      MCV 85.7 fL      MCH 28.1 pg      MCHC 32.7 g/dL      RDW 13.5 %      RDW-SD 43.0 fl      MPV 9.7 fL      Platelets 299 10*3/mm3      Neutrophil % 53.5 %      Lymphocyte % 33.1 %      Monocyte % 9.1 %      Eosinophil % 3.2 %      Basophil % 0.8 %      Immature Grans % 0.3 %      Neutrophils, Absolute 3.90 10*3/mm3      Lymphocytes, Absolute 2.41 10*3/mm3      Monocytes, Absolute 0.66 10*3/mm3      Eosinophils, Absolute 0.23 10*3/mm3      Basophils, Absolute 0.06 10*3/mm3      Immature Grans, Absolute 0.02 10*3/mm3              No orders to display                                   MDM  Number of Diagnoses or Management Options     Amount and/or Complexity of Data Reviewed  Clinical lab tests: reviewed  Tests in the medicine section of CPT®: reviewed    Risk of Complications, Morbidity, and/or Mortality  Presenting problems: high  Diagnostic procedures:  high  Management options: high    Patient Progress  Patient progress: stable   patient's symptoms are most consistent with a cervical radiculopathy.  She says she did some yard work yesterday which may have flared it up.  Cardiac disease is unlikely because she had a negative heart cath approximately 16 months ago.  She also has a normal EKG here negative troponin.  We gave her an injection of Toradol with some relief.  She is declining all imaging here.  We will discharge her home on a muscle relaxer and a Lidoderm patch.  Recommend she follow-up with her primary doctor if symptoms persist she may need to be referred to a spine surgeon.    Final diagnoses:   Acute left-sided thoracic back pain   Cervical radiculopathy       ED Disposition  ED Disposition     ED Disposition   Discharge    Condition   Stable    Comment   --             Masood Braden, APRN  [address] [facility] [address] [phone]    In 3 days  If not improved         Medication List      New Prescriptions    lidocaine 5 %  Commonly known as: LIDODERM  Place 1 patch on the skin as directed by provider Daily. Remove & Discard patch within 12 hours or as directed by MD     methocarbamol 500 MG tablet  Commonly known as: ROBAXIN  Take 1 tablet by mouth 2 (Two) Times a Day As Needed for Muscle Spasms (upper back/shoulder pain).           Where to Get Your Medications      These medications were sent to [id number] - [place], [place] - [address] [facility] AT [facility] - [phone]  - [phone] fx [address] [place] [address]    Phone: [phone]   · lidocaine 5 %  · methocarbamol 500 MG tablet          Gabriel Salgado MD  09/01/22 0057

## 2022-10-18 ENCOUNTER — TRANSCRIBE ORDERS (OUTPATIENT)
Dept: ADMINISTRATIVE | Facility: HOSPITAL | Age: 82
End: 2022-10-18

## 2022-10-18 DIAGNOSIS — Z12.31 SCREENING MAMMOGRAM FOR BREAST CANCER: Primary | ICD-10-CM

## 2022-11-15 NOTE — PROGRESS NOTES
Chief Complaint  Diabetes (New pt, est care with diabetic provider, abnormal lab work )    Referred By: Kenji Farley MD    Subjective          Kathryn More presents to [facility] for diabetes medication management    History of Present Illness    Visit type:  to establish care  Diabetes type:  Prediabetes  Age at time of dx/Year of dx/Number of years:  She is unsure of how long  Family History of Diabetes: none  Current diabetes status/concerns/issues: She has been referred to our office because of concerns regarding increase in her A1c.  She has been told that her A1c was elevated over the last 6 months to a year.  In review of her medical record the labs show a high A1c of 6.0 indicating prediabetes.  Her most recent A1c is improved and down to 5.7.  She is also concerned because she has changes to her renal function.  Other current health concerns: She recently underwent a thyroidectomy due to parathyroid nodules.  Diabetes symptoms:    Polyuria: No   Polydipsia: No   Polyphagia: No   Blurred vision: No   Excessive fatigue: No  Diabetes complications:  Neuro: None  Renal: Stage IIIa renal disease  Eyes: None  Amputation/Wounds: None  GI: None  Cardiovascular: Hyperlipidemia and hypertension  ED: N/A  Other: None  Hospitalizations/ED/911 secondary to DM?  No  Hypoglycemia:  None reported at this time  Hypoglycemia Symptoms:  No hypoglycemia at this time  Current Diabetes treatment:  none  Prior diabetes treatments:  none  Blood glucose device:  Meter  Blood glucose monitoring frequency:  random  Blood glucose range/average:  She states they are never elevated  Dietary behavior:  Limits high carb/sweet foods, Avoids sugary drinks, she does have a rare coke  Activity/Exercise:  she is active with work at home      Past Medical History:   Diagnosis Date   • Arthritis    • Disease of thyroid gland     parathyroid nodules   • Emphysema of lung (HCC)    • GERD (gastroesophageal  reflux disease)    • Hyperlipidemia    • Hypertension    • Lung nodules    • Macular degeneration     of eyes   • PONV (postoperative nausea and vomiting)    • Skin cancer    • Sleep apnea     uses c pap   • Vertigo      Past Surgical History:   Procedure Laterality Date   • BREAST SURGERY      Jonatan breast bx's  benign   • CARDIAC CATHETERIZATION     • CHOLECYSTECTOMY     • DILATATION AND CURETTAGE      x2   • EYE SURGERY      cataracts   • SKIN CANCER EXCISION      Face   • THYROIDECTOMY Bilateral 10/12/2021    Procedure: NECK EXPLORATION WITH LEFT PARATHYROID ADENOMA EXCISION AND FROZEN SECTION, INTRAOPERATIVE INTACT PTH ASSAY, RIGHT THYROIDECTOMY WITH FROZEN SECTION; LEFT PARTIAL THYROIDECTOMY; RECURRENT LARYNGEAL NERVE MONITORING;  Surgeon: Kenji Farley MD;  Location: [facility] MA[facility] OR;  Service: ENT;  Laterality: Bilateral;     Family History   Problem Relation Age of Onset   • Stroke Mother    • Heart disease Father      Social History     Socioeconomic History   • Marital status:    • Number of children: 1   Tobacco Use   • Smoking status: Never   • Smokeless tobacco: Never   Vaping Use   • Vaping Use: Never used   Substance and Sexual Activity   • Alcohol use: Never   • Drug use: Never   • Sexual activity: Defer     Allergies   Allergen Reactions   • Biaxin [Clarithromycin] GI Intolerance     Terrible stomach pain   • Epinephrine Unknown - High Severity     syncope   • Procaine Unknown - High Severity       Current Outpatient Medications:   •  Aflibercept (Eylea) 2 MG/0.05ML solution, by Intravitreal route Every 2 (Two) Months., Disp: , Rfl:   •  aspirin 81 MG chewable tablet, Chew 1 tablet Daily. Last dose 10/04/21 per pt, Disp: , Rfl:   •  butalbital-acetaminophen-caffeine (FIORICET, ESGIC) -40 MG per tablet, TAKE 1 TABLET BY MOUTH THREE TIMES DAILY AS NEEDED FOR PAIN OR HEADACHE, Disp: , Rfl:   •  Coenzyme Q10 (COQ-10 PO), Take 1 capsule by mouth Daily., Disp: , Rfl:   •  Glucosamine Sulfate  500 MG tablet, , Disp: , Rfl:   •  magnesium cl-calcium carbonate (SLOW-MAG) 71.5-119 MG tablet delayed-release tablet, , Disp: , Rfl:   •  magnesium oxide (MAG-OX) 400 MG tablet, magnesium oxide 400 mg magnesium oral tablet take 1 tablet by oral route daily   Active, Disp: , Rfl:   •  meclizine (ANTIVERT) 25 MG tablet, Take 25 mg by mouth 3 (Three) Times a Day As Needed for Dizziness., Disp: , Rfl:   •  Multiple Vitamins-Minerals (PRESERVISION AREDS 2 PO), Take  by mouth., Disp: , Rfl:   •  multivitamin (THERAGRAN) tablet tablet, Take  by mouth Daily. One a day vitamin, Disp: , Rfl:   •  nitroglycerin (NITROSTAT) 0.4 MG SL tablet, Place 0.4 mg under the tongue Every 5 (Five) Minutes As Needed for Chest Pain. Take no more than 3 doses in 15 minutes., Disp: , Rfl:   •  omeprazole (priLOSEC) 20 MG capsule, , Disp: , Rfl:   •  Uxiztk-BcIpd-BvGowv-FA-Omega (MULTIVITAMIN/MINERALS PO), Take  by mouth., Disp: , Rfl:   •  Probiotic Product (PROBIOTIC ADVANCED PO), Take 1 capsule by mouth Daily., Disp: , Rfl:   •  rosuvastatin (CRESTOR) 5 MG tablet, Take 5 mg by mouth., Disp: , Rfl:   •  sertraline (ZOLOFT) 50 MG tablet, Take 50 mg by mouth Every Night., Disp: , Rfl:   •  spironolactone (ALDACTONE) 25 MG tablet, Take 25 mg by mouth., Disp: , Rfl:   •  Synthroid 100 MCG tablet, Take 100 mcg by mouth Daily., Disp: , Rfl:   •  traMADol (ULTRAM) 50 MG tablet, tramadol 50 mg oral tablet take 1 tablet (50 mg) by oral route every 4 hours as needed   Active, Disp: , Rfl:   •  valsartan (DIOVAN) 160 MG tablet, Take 160 mg by mouth Every Night., Disp: , Rfl:   •  valsartan (DIOVAN) 80 MG tablet, Take 80 mg by mouth Every Night. Takes along with 160mg tab, Disp: , Rfl:   •  butalbital-acetaminophen-caffeine (FIORICET, ESGIC) -40 MG per tablet, Take 1 tablet by mouth., Disp: , Rfl:   •  Glucosamine-Chondroitin 250-200 MG tablet, , Disp: , Rfl:   •  Lancets (Safety Lancet 28G/Pressure Act) misc, 1 each As Needed (test BG as needed  "once daily)., Disp: 50 each, Rfl: 2  •  magnesium chloride ER 64 MG DR tablet, Take  by mouth., Disp: , Rfl:     Review of Systems   Constitutional: Negative for activity change, appetite change, fatigue, unexpected weight gain and unexpected weight loss.   Eyes: Negative for blurred vision and visual disturbance.   Gastrointestinal: Positive for GERD. Negative for abdominal pain, constipation, diarrhea, nausea, vomiting and indigestion.   Endocrine: Negative for polydipsia, polyphagia and polyuria.   Neurological: Negative for numbness.        Objective     Vitals:    11/16/22 0843   BP: 137/71   BP Location: Left arm   Patient Position: Sitting   Cuff Size: Adult   Pulse: 78   Temp: 97.3 °F (36.3 °C)   SpO2: 96%   Weight: 73.1 kg (161 lb 2.5 oz)   Height: 160 cm (63\")   PainSc: 0-No pain     Body mass index is 28.55 kg/m².    Physical Exam  Constitutional:       Appearance: Normal appearance.      Comments: Overweight with BMI of 28.55   HENT:      Head: Normocephalic and atraumatic.      Right Ear: External ear normal.      Left Ear: External ear normal.      Nose: Nose normal.   Eyes:      Extraocular Movements: Extraocular movements intact.      Conjunctiva/sclera: Conjunctivae normal.   Pulmonary:      Effort: Pulmonary effort is normal.   Musculoskeletal:         General: Normal range of motion.      Cervical back: Normal range of motion.   Skin:     General: Skin is warm and dry.   Neurological:      General: No focal deficit present.      Mental Status: She is alert and oriented to person, place, and time. Mental status is at baseline.   Psychiatric:         Mood and Affect: Mood normal.         Behavior: Behavior normal.         Thought Content: Thought content normal.         Judgment: Judgment normal.         Result Review :   The following data was reviewed by: DONNIE Noguera on 11/16/2022:    Most Recent A1C    HGBA1C Most Recent 8/12/22   Hemoglobin A1C 6.0 (A)   (A) Abnormal value        "       A1C Last 3 Results    HGBA1C Last 3 Results 8/12/22   Hemoglobin A1C 6.0 (A)   (A) Abnormal value            Reports recent A1c was collected on 11/11/2022 and was 5.7% indicating prediabetes.  This is down from a prior result of 6.0 collected in August of this year.  A copy of this lab is available in the record.    Glucose   Date Value Ref Range Status   11/16/2022 105 (H) 70 - 99 mg/dL Final     Comment:     Serial Number: [id number]:  [id number]     Point-of-care glucose level is within normal limits for nonfasting glucose    Creatinine   Date Value Ref Range Status   08/31/2022 1.32 (H) 0.57 - 1.00 mg/dL Final   08/12/2022 0.94 0.57 - 1.00 mg/dL Final   06/29/2018 1.0 0.7 - 1.5 mg/dL Final     eGFR   Date Value Ref Range Status   08/31/2022 40.4 (L) >60.0 mL/min/1.73 Final     Comment:     National Kidney Foundation and American Society of Nephrology (ASN) Task Force recommended calculation based on the Chronic Kidney Disease Epidemiology Collaboration (CKD-EPI) equation refit without adjustment for race.   08/12/2022 60.7 >60.0 mL/min/1.73 Final     Comment:     National Kidney Foundation and American Society of Nephrology (ASN) Task Force recommended calculation based on the Chronic Kidney Disease Epidemiology Collaboration (CKD-EPI) equation refit without adjustment for race.     Labs collected on 8/31/2022 show stage IIIb renal disease.  Repeat labs collected on 11/11/2022 show slight improvement with A1c at 1.1 and GFR at 50 indicating stage IIIa renal disease            Assessment: The patient's A1c indicates prediabetes.  In review of her chart it appears she has had prediabetes for an extended period of time as there was an A1c of 6.0 collected approximately 4 years ago.  We discussed a variety of options to prevent diabetes including the use of metformin, diet, and exercise.  We elected not to use metformin given the patient's current renal status.      Diagnoses and all orders for this  visit:    1. Prediabetes (Primary)  -     Lancets (Safety Lancet 28G/Pressure Act) misc; 1 each As Needed (test BG as needed once daily).  Dispense: 50 each; Refill: 2    Other orders  -     POC Glucose        Plan: The patient was advised to monitor her dietary intake as it relates to concentrated sweets.  She is encouraged to increase her physical activity to control her weight and minimize the risk of developing type 2 diabetes later.  She is encouraged to follow-up with her PCP regarding the altered renal function.  She may need a referral to nephrology services for further evaluation.    The patient will monitor her blood glucose levels as needed.  If she develops problematic hyperglycemia or hypoglycemia or adverse drug reactions, she will contact the office for further instructions.        Follow Up     Return in about 6 months (around 5/16/2023) for Medication Management.    Patient was given instructions and counseling regarding her condition or for health maintenance advice. Please see specific information pulled into the AVS if appropriate.     Malu Shelley, APRN  11/16/2022      Dictated Utilizing Dragon Dictation.  Please note that portions of this note were completed with a voice recognition program.  Part of this note may be an electronic transcription/translation of spoken language to printed text using the Dragon Dictation System.

## 2022-11-16 ENCOUNTER — OFFICE VISIT (OUTPATIENT)
Dept: DIABETES SERVICES | Facility: HOSPITAL | Age: 82
End: 2022-11-16

## 2022-11-16 VITALS
SYSTOLIC BLOOD PRESSURE: 137 MMHG | TEMPERATURE: 97.3 F | DIASTOLIC BLOOD PRESSURE: 71 MMHG | HEART RATE: 78 BPM | WEIGHT: 161.16 LBS | BODY MASS INDEX: 28.55 KG/M2 | OXYGEN SATURATION: 96 % | HEIGHT: 63 IN

## 2022-11-16 DIAGNOSIS — R73.03 PREDIABETES: Primary | ICD-10-CM

## 2022-11-16 PROBLEM — R42 DISEQUILIBRIUM: Status: ACTIVE | Noted: 2021-12-10

## 2022-11-16 PROBLEM — H35.30 MACULAR DEGENERATION: Status: ACTIVE | Noted: 2022-11-16

## 2022-11-16 PROBLEM — C44.90 MALIGNANT NEOPLASM OF SKIN: Status: ACTIVE | Noted: 2022-11-16

## 2022-11-16 PROBLEM — Z13.6 SCREENING FOR CARDIOVASCULAR CONDITION: Status: ACTIVE | Noted: 2021-08-18

## 2022-11-16 PROBLEM — I25.10 CORONARY ARTERY DISEASE: Status: ACTIVE | Noted: 2022-11-16

## 2022-11-16 PROBLEM — N20.0 KIDNEY STONE: Status: ACTIVE | Noted: 2022-11-16

## 2022-11-16 PROBLEM — R07.89 ATYPICAL CHEST PAIN: Status: ACTIVE | Noted: 2021-12-10

## 2022-11-16 PROBLEM — R60.9 EDEMA: Status: ACTIVE | Noted: 2022-11-16

## 2022-11-16 PROBLEM — E78.00 HYPERCHOLESTEROLEMIA: Status: ACTIVE | Noted: 2022-11-16

## 2022-11-16 PROBLEM — I37.1: Status: ACTIVE | Noted: 2022-11-16

## 2022-11-16 PROBLEM — R73.9 ELEVATED BLOOD SUGAR: Status: ACTIVE | Noted: 2021-08-18

## 2022-11-16 PROBLEM — H26.9 CATARACT: Status: ACTIVE | Noted: 2022-11-16

## 2022-11-16 PROBLEM — R42 VERTIGO: Status: ACTIVE | Noted: 2022-11-16

## 2022-11-16 PROBLEM — I38 HEART VALVE DISORDER: Status: ACTIVE | Noted: 2021-06-29

## 2022-11-16 PROBLEM — R00.2 PALPITATIONS: Status: ACTIVE | Noted: 2021-06-29

## 2022-11-16 LAB — GLUCOSE BLDC GLUCOMTR-MCNC: 105 MG/DL (ref 70–99)

## 2022-11-16 PROCEDURE — 82962 GLUCOSE BLOOD TEST: CPT | Performed by: NURSE PRACTITIONER

## 2022-11-16 PROCEDURE — G0463 HOSPITAL OUTPT CLINIC VISIT: HCPCS | Performed by: NURSE PRACTITIONER

## 2022-11-16 PROCEDURE — 99203 OFFICE O/P NEW LOW 30 MIN: CPT | Performed by: NURSE PRACTITIONER

## 2022-11-16 RX ORDER — LANCETS 28 GAUGE
1 EACH MISCELLANEOUS AS NEEDED
Qty: 50 EACH | Refills: 2 | Status: SHIPPED | OUTPATIENT
Start: 2022-11-16

## 2022-11-16 RX ORDER — BUTALBITAL, ACETAMINOPHEN AND CAFFEINE 50; 325; 40 MG/1; MG/1; MG/1
1 TABLET ORAL
COMMUNITY
Start: 2022-09-12 | End: 2022-12-12

## 2022-11-18 ENCOUNTER — PATIENT ROUNDING (BHMG ONLY) (OUTPATIENT)
Dept: DIABETES SERVICES | Facility: HOSPITAL | Age: 82
End: 2022-11-18

## 2022-11-18 NOTE — PROGRESS NOTES
November 18, 2022    Hello, may I speak with Kathryn More?    My name is Carmen Luong      I am  with [facility] [address] [place] [address] [phone].    Before we get started may I verify your date of birth? 1940    I am calling to officially welcome you to our practice and ask about your recent visit. Is this a good time to talk? yes    Tell me about your visit with us. What things went well?  Everything was great.       We're always looking for ways to make our patients' experiences even better. Do you have recommendations on ways we may improve?  no    Overall were you satisfied with your first visit to our practice? yes       I appreciate you taking the time to speak with me today. Is there anything else I can do for you? no      Thank you, and have a great day.

## 2022-11-22 ENCOUNTER — APPOINTMENT (OUTPATIENT)
Dept: MAMMOGRAPHY | Facility: HOSPITAL | Age: 82
End: 2022-11-22

## 2022-11-22 ENCOUNTER — APPOINTMENT (OUTPATIENT)
Dept: ULTRASOUND IMAGING | Facility: HOSPITAL | Age: 82
End: 2022-11-22

## 2022-11-29 ENCOUNTER — LAB (OUTPATIENT)
Dept: LAB | Facility: HOSPITAL | Age: 82
End: 2022-11-29

## 2022-11-29 ENCOUNTER — TRANSCRIBE ORDERS (OUTPATIENT)
Dept: ADMINISTRATIVE | Facility: HOSPITAL | Age: 82
End: 2022-11-29

## 2022-11-29 ENCOUNTER — HOSPITAL ENCOUNTER (OUTPATIENT)
Dept: MAMMOGRAPHY | Facility: HOSPITAL | Age: 82
Discharge: HOME | End: 2022-11-29

## 2022-11-29 ENCOUNTER — HOSPITAL ENCOUNTER (OUTPATIENT)
Dept: ULTRASOUND IMAGING | Facility: HOSPITAL | Age: 82
Discharge: HOME | End: 2022-11-29

## 2022-11-29 DIAGNOSIS — E03.9 HYPOTHYROIDISM, ADULT: Primary | ICD-10-CM

## 2022-11-29 DIAGNOSIS — Z12.31 SCREENING MAMMOGRAM FOR BREAST CANCER: ICD-10-CM

## 2022-11-29 DIAGNOSIS — C73 MALIGNANT NEOPLASM OF THYROID GLAND: ICD-10-CM

## 2022-11-29 DIAGNOSIS — E03.9 HYPOTHYROIDISM, ADULT: ICD-10-CM

## 2022-11-29 LAB
T3FREE SERPL-MCNC: 3.24 PG/ML (ref 2–4.4)
T4 FREE SERPL-MCNC: 1.29 NG/DL (ref 0.93–1.7)
TSH SERPL DL<=0.05 MIU/L-ACNC: 0.12 UIU/ML (ref 0.27–4.2)

## 2022-11-29 PROCEDURE — 77067 SCR MAMMO BI INCL CAD: CPT

## 2022-11-29 PROCEDURE — 77063 BREAST TOMOSYNTHESIS BI: CPT

## 2022-11-29 PROCEDURE — 84443 ASSAY THYROID STIM HORMONE: CPT

## 2022-11-29 PROCEDURE — 84439 ASSAY OF FREE THYROXINE: CPT

## 2022-11-29 PROCEDURE — 84481 FREE ASSAY (FT-3): CPT

## 2022-11-29 PROCEDURE — 36415 COLL VENOUS BLD VENIPUNCTURE: CPT

## 2022-11-29 PROCEDURE — 76536 US EXAM OF HEAD AND NECK: CPT

## 2022-12-09 ENCOUNTER — APPOINTMENT (OUTPATIENT)
Dept: MAMMOGRAPHY | Facility: HOSPITAL | Age: 82
End: 2022-12-09

## 2023-01-04 ENCOUNTER — LAB (OUTPATIENT)
Dept: LAB | Facility: HOSPITAL | Age: 83
End: 2023-01-04
Payer: MEDICARE

## 2023-01-04 ENCOUNTER — TRANSCRIBE ORDERS (OUTPATIENT)
Dept: ADMINISTRATIVE | Facility: HOSPITAL | Age: 83
End: 2023-01-04
Payer: MEDICARE

## 2023-01-04 DIAGNOSIS — E03.9 MYXEDEMA HEART DISEASE: Primary | ICD-10-CM

## 2023-01-04 DIAGNOSIS — I51.9 MYXEDEMA HEART DISEASE: ICD-10-CM

## 2023-01-04 DIAGNOSIS — I51.9 MYXEDEMA HEART DISEASE: Primary | ICD-10-CM

## 2023-01-04 DIAGNOSIS — E03.9 MYXEDEMA HEART DISEASE: ICD-10-CM

## 2023-01-04 LAB
T3FREE SERPL-MCNC: 2.66 PG/ML (ref 2–4.4)
T4 FREE SERPL-MCNC: 1.42 NG/DL (ref 0.93–1.7)
TSH SERPL DL<=0.05 MIU/L-ACNC: 0.21 UIU/ML (ref 0.27–4.2)

## 2023-01-04 PROCEDURE — 84481 FREE ASSAY (FT-3): CPT

## 2023-01-04 PROCEDURE — 84443 ASSAY THYROID STIM HORMONE: CPT

## 2023-01-04 PROCEDURE — 84439 ASSAY OF FREE THYROXINE: CPT

## 2023-01-04 PROCEDURE — 36415 COLL VENOUS BLD VENIPUNCTURE: CPT

## 2023-01-05 ENCOUNTER — HOSPITAL ENCOUNTER (EMERGENCY)
Facility: HOSPITAL | Age: 83
Discharge: HOME | End: 2023-01-05
Attending: EMERGENCY MEDICINE | Admitting: EMERGENCY MEDICINE
Payer: MEDICARE

## 2023-01-05 ENCOUNTER — APPOINTMENT (OUTPATIENT)
Dept: CT IMAGING | Facility: HOSPITAL | Age: 83
End: 2023-01-05
Payer: MEDICARE

## 2023-01-05 VITALS
OXYGEN SATURATION: 98 % | WEIGHT: 160 LBS | HEIGHT: 63 IN | HEART RATE: 93 BPM | SYSTOLIC BLOOD PRESSURE: 142 MMHG | DIASTOLIC BLOOD PRESSURE: 94 MMHG | RESPIRATION RATE: 16 BRPM | BODY MASS INDEX: 28.35 KG/M2 | TEMPERATURE: 98.9 F

## 2023-01-05 DIAGNOSIS — R10.31 RIGHT GROIN PAIN: ICD-10-CM

## 2023-01-05 DIAGNOSIS — N30.91 HEMORRHAGIC CYSTITIS: Primary | ICD-10-CM

## 2023-01-05 LAB
BILIRUB UR QL STRIP: ABNORMAL
CLARITY UR: ABNORMAL
COLOR UR: ABNORMAL
GLUCOSE UR STRIP-MCNC: ABNORMAL MG/DL
HGB UR QL STRIP.AUTO: ABNORMAL
KETONES UR QL STRIP: ABNORMAL
LEUKOCYTE ESTERASE UR QL STRIP.AUTO: ABNORMAL
NITRITE UR QL STRIP: POSITIVE
PH UR STRIP.AUTO: >=9 [PH] (ref 5–8)
PROT UR QL STRIP: ABNORMAL
SP GR UR STRIP: <=1.005 (ref 1–1.03)
UROBILINOGEN UR QL STRIP: ABNORMAL

## 2023-01-05 PROCEDURE — 99283 EMERGENCY DEPT VISIT LOW MDM: CPT

## 2023-01-05 PROCEDURE — 99283 EMERGENCY DEPT VISIT LOW MDM: CPT | Performed by: EMERGENCY MEDICINE

## 2023-01-05 PROCEDURE — 81003 URINALYSIS AUTO W/O SCOPE: CPT | Performed by: EMERGENCY MEDICINE

## 2023-01-05 PROCEDURE — 74176 CT ABD & PELVIS W/O CONTRAST: CPT

## 2023-01-05 RX ORDER — CEPHALEXIN 500 MG/1
500 CAPSULE ORAL 3 TIMES DAILY
Qty: 30 CAPSULE | Refills: 0 | Status: SHIPPED | OUTPATIENT
Start: 2023-01-05 | End: 2023-01-15

## 2023-01-05 RX ORDER — CEPHALEXIN 500 MG/1
500 CAPSULE ORAL ONCE
Status: COMPLETED | OUTPATIENT
Start: 2023-01-05 | End: 2023-01-05

## 2023-01-05 RX ADMIN — CEPHALEXIN 500 MG: 500 CAPSULE ORAL at 22:32

## 2023-01-06 NOTE — DISCHARGE INSTRUCTIONS
Drink plenty fluids to stay hydrated.  Take Keflex as prescribed for urinary tract infection.  Please follow-up with your provider or call patient connection to get established with new provider if needed.  Seek immediate medical attention having worsening symptoms, fevers, vomiting, or any concerns.

## 2023-01-06 NOTE — FSED PROVIDER NOTE
Subjective   History of Present Illness  Patient is an 82-year-old woman who presents complaining of dysuria with hematuria and urinary urgency and frequency.  Symptoms all began today.  Patient denies any nausea or vomiting or fevers or back pain.  At times the patient does have right groin pain which she felt 1 day ago but none at this time.  Patient has been told she needed to follow-up with urology as she has cora she has had slow stream when urinating which has been a chronic issue.  Patient also recently followed up with her thyroid provider who reviewed some labs back in August and noted a creatinine of 1.3.  I reviewed these lab tests the patient did have a creatinine was 0.9 and then 1.3.          Review of Systems   Constitutional: Negative for chills and fever.   HENT: Negative for rhinorrhea.    Respiratory: Negative for shortness of breath.    Cardiovascular: Negative for chest pain.   Gastrointestinal: Positive for abdominal pain (Right groin pain.). Negative for diarrhea, nausea and vomiting.   Genitourinary: Positive for difficulty urinating, dysuria, frequency, hematuria and urgency. Negative for flank pain.   Musculoskeletal: Negative for back pain.   Skin: Negative for pallor.   Neurological: Negative for light-headedness.       Past Medical History:   Diagnosis Date   • Arthritis    • Disease of thyroid gland     parathyroid nodules   • Emphysema of lung (HCC)    • GERD (gastroesophageal reflux disease)    • Hyperlipidemia    • Hypertension    • Lung nodules    • Macular degeneration     of eyes   • PONV (postoperative nausea and vomiting)    • Skin cancer    • Sleep apnea     uses c pap   • Vertigo        Allergies   Allergen Reactions   • Biaxin [Clarithromycin] GI Intolerance     Terrible stomach pain   • Epinephrine Unknown - High Severity     syncope   • Procaine Unknown - High Severity       Past Surgical History:   Procedure Laterality Date   • BREAST SURGERY      Jonatan breast bx's  benign    • CARDIAC CATHETERIZATION     • CHOLECYSTECTOMY     • DILATATION AND CURETTAGE      x2   • EYE SURGERY      cataracts   • SKIN CANCER EXCISION      Face   • THYROIDECTOMY Bilateral 10/12/2021    Procedure: NECK EXPLORATION WITH LEFT PARATHYROID ADENOMA EXCISION AND FROZEN SECTION, INTRAOPERATIVE INTACT PTH ASSAY, RIGHT THYROIDECTOMY WITH FROZEN SECTION; LEFT PARTIAL THYROIDECTOMY; RECURRENT LARYNGEAL NERVE MONITORING;  Surgeon: Kenji Farley MD;  Location: [facility] OR;  Service: ENT;  Laterality: Bilateral;       Family History   Problem Relation Age of Onset   • Stroke Mother    • Heart disease Father        Social History     Socioeconomic History   • Marital status:    • Number of children: 1   Tobacco Use   • Smoking status: Never   • Smokeless tobacco: Never   Vaping Use   • Vaping Use: Never used   Substance and Sexual Activity   • Alcohol use: Never   • Drug use: Never   • Sexual activity: Defer           Objective   Physical Exam  Vitals and nursing note reviewed.   Constitutional:       General: She is not in acute distress.     Appearance: Normal appearance. She is not ill-appearing or toxic-appearing.      Comments: Patient sitting on gurney.  She appears to be comfortable in no distress at this time.   HENT:      Head: Normocephalic and atraumatic.      Nose: Nose normal.      Mouth/Throat:      Mouth: Mucous membranes are moist.      Pharynx: Oropharynx is clear.   Eyes:      Extraocular Movements: Extraocular movements intact.      Conjunctiva/sclera: Conjunctivae normal.      Pupils: Pupils are equal, round, and reactive to light.   Cardiovascular:      Rate and Rhythm: Normal rate and regular rhythm.      Pulses: Normal pulses.      Heart sounds: Normal heart sounds.   Pulmonary:      Breath sounds: Normal breath sounds.   Abdominal:      Palpations: Abdomen is soft.      Tenderness: There is no abdominal tenderness.   Musculoskeletal:         General: No swelling or tenderness. Normal  range of motion.      Cervical back: Normal range of motion.      Right lower leg: No edema.      Left lower leg: No edema.   Skin:     General: Skin is warm and dry.      Capillary Refill: Capillary refill takes less than 2 seconds.      Coloration: Skin is not pale.   Neurological:      General: No focal deficit present.      Mental Status: She is alert.      Sensory: No sensory deficit.      Motor: No weakness.         Procedures           ED Course                                           MDM  Patient 82-year-old woman who presents complaining of UTI symptoms with dysuria and hematuria and urinary frequency and urgency.  Symptoms all began today.  Patient also states that she has had chronic issues with difficulty voiding with slow stream.  During those symptoms she has never had hematuria or dysuria.  Patient recently was seen by her thyroid provider who reviewed blood tests from August which showed a creatinine 1.3.  Patient was unaware of this abnormality.  Patient has not had any nausea or vomiting or decreased urine output or swelling or edema.  Patient does not appear to be any distress and has no back or flank or abdominal pain.  Patient did voice having pains to the right groin 1 day ago.  On urinalysis she has positive nitrite and blood and leuk esterase.  Patient likely has a UTI but with the pain that she experienced 1 day ago to the right groin and sure she may have a renal stone.  Patient did undergo CT renal stone protocol.  But also advised patient she should follow-up with urology.  Patient has been given patient connection number to establish with a provider if she needs one.  Patient will return or seek immediate medical attention if having any concerns.    CT shows no acute abnormalities and no renal stones.  Had lengthy conversation the patient regarding her symptoms.  Symptoms likely due to hemorrhagic cystitis which should clear up with antibiotics.  Patient has been given follow-up  information with urology and also with the patient connection.    Final diagnoses:   Hemorrhagic cystitis   Right groin pain       ED Disposition  ED Disposition     ED Disposition   Discharge    Condition   Stable    Comment   --             PATIENT CONNECTION - KATHRIN  [facility] [id number] [phone]  Call in 1 week  Call to get established with a primary provider    [facility] KATHRIN EVETTE [facility]  [address] [facility] [address] [phone]    If symptoms worsen    [facility]  [address] [facility] [id number] [phone]  Call   Call for follow-up with urology to evaluate hematuria.         Medication List      New Prescriptions    cephalexin 500 MG capsule  Commonly known as: KEFLEX  Take 1 capsule by mouth 3 (Three) Times a Day for 10 days.           Where to Get Your Medications      These medications were sent to [facility][id number] [place], [place] - [address] [facility] AT [facility] - [phone]  - [phone] fx [address] [place] [address]    Phone: [phone]   · cephalexin 500 MG capsule

## 2023-01-18 ENCOUNTER — HOSPITAL ENCOUNTER (OUTPATIENT)
Facility: HOSPITAL | Age: 83
Discharge: HOME | End: 2023-01-18
Attending: EMERGENCY MEDICINE | Admitting: EMERGENCY MEDICINE
Payer: MEDICARE

## 2023-01-18 VITALS
TEMPERATURE: 97.5 F | BODY MASS INDEX: 28.35 KG/M2 | HEIGHT: 63 IN | HEART RATE: 78 BPM | OXYGEN SATURATION: 93 % | RESPIRATION RATE: 12 BRPM | SYSTOLIC BLOOD PRESSURE: 136 MMHG | DIASTOLIC BLOOD PRESSURE: 67 MMHG | WEIGHT: 160 LBS

## 2023-01-18 DIAGNOSIS — N30.90 CYSTITIS: ICD-10-CM

## 2023-01-18 DIAGNOSIS — R31.9 HEMATURIA, UNSPECIFIED TYPE: ICD-10-CM

## 2023-01-18 DIAGNOSIS — R30.0 DYSURIA: Primary | ICD-10-CM

## 2023-01-18 LAB
BILIRUB UR QL STRIP: NEGATIVE
CLARITY UR: CLEAR
COLOR UR: YELLOW
GLUCOSE UR STRIP-MCNC: NEGATIVE MG/DL
HGB UR QL STRIP.AUTO: ABNORMAL
KETONES UR QL STRIP: NEGATIVE
LEUKOCYTE ESTERASE UR QL STRIP.AUTO: ABNORMAL
NITRITE UR QL STRIP: NEGATIVE
PH UR STRIP.AUTO: 5.5 [PH] (ref 5–8)
PROT UR QL STRIP: NEGATIVE
SP GR UR STRIP: <=1.005 (ref 1–1.03)
UROBILINOGEN UR QL STRIP: ABNORMAL

## 2023-01-18 PROCEDURE — G0463 HOSPITAL OUTPT CLINIC VISIT: HCPCS | Performed by: EMERGENCY MEDICINE

## 2023-01-18 PROCEDURE — 81003 URINALYSIS AUTO W/O SCOPE: CPT

## 2023-01-18 PROCEDURE — 99214 OFFICE O/P EST MOD 30 MIN: CPT | Performed by: EMERGENCY MEDICINE

## 2023-01-18 RX ORDER — PHENAZOPYRIDINE HYDROCHLORIDE 200 MG/1
200 TABLET, FILM COATED ORAL 3 TIMES DAILY PRN
Qty: 6 TABLET | Refills: 0 | Status: SHIPPED | OUTPATIENT
Start: 2023-01-18 | End: 2023-01-20

## 2023-01-18 RX ORDER — NITROFURANTOIN 25; 75 MG/1; MG/1
100 CAPSULE ORAL 2 TIMES DAILY
Qty: 14 CAPSULE | Refills: 0 | Status: SHIPPED | OUTPATIENT
Start: 2023-01-18 | End: 2023-01-25

## 2023-01-18 RX ORDER — FLUCONAZOLE 150 MG/1
TABLET ORAL
Qty: 2 TABLET | Refills: 0 | Status: SHIPPED | OUTPATIENT
Start: 2023-01-18

## 2023-01-18 NOTE — FSED PROVIDER NOTE
Subjective   History of Present Illness  Patient is an 82-year-old who was seen here on January 5.  The patient had unremarkable CAT scan.       IMPRESSION:  1.No evidence for significant nephrolithiasis. There is no evidence for obstructive uropathy.  2.No evidence for acute abnormality throughout the abdomen or pelvis on this noncontrast exam    The patient had gross hematuria.  The patient follow-up with her primary as well as urology.  The urologist has scheduled her for cystoscopy as well as a specific IV contrast CT scan.  The patient at this point continues to have dysuria.  The patient is worried she may have a yeast infection.  The patient was treated with Keflex.  The patient has bouts of some crampy abdominal pain.  It hurts when she urinates.  The patient at this point denies any severe flank pain.  Once again her hematuria has improved greatly.  She cannot see blood in her urine anymore.        Review of Systems   Constitutional: Negative.    HENT: Negative.  Negative for rhinorrhea and sore throat.    Eyes: Negative.    Respiratory: Negative.  Negative for chest tightness and shortness of breath.    Cardiovascular: Negative.    Gastrointestinal: Positive for abdominal pain. Negative for diarrhea, nausea and vomiting.   Endocrine: Negative.    Genitourinary: Positive for dysuria and flank pain.   Musculoskeletal: Negative for back pain.   Skin: Negative.    Allergic/Immunologic: Negative.    Neurological: Negative.    Hematological: Negative.    Psychiatric/Behavioral: Negative.    All other systems reviewed and are negative.      Past Medical History:   Diagnosis Date   • Arthritis    • Disease of thyroid gland     parathyroid nodules   • Emphysema of lung (HCC)    • GERD (gastroesophageal reflux disease)    • Hyperlipidemia    • Hypertension    • Lung nodules    • Macular degeneration     of eyes   • PONV (postoperative nausea and vomiting)    • Skin cancer    • Sleep apnea     uses c pap   • Vertigo         Allergies   Allergen Reactions   • Biaxin [Clarithromycin] GI Intolerance     Terrible stomach pain   • Epinephrine Unknown - High Severity     syncope   • Procaine Unknown - High Severity       Past Surgical History:   Procedure Laterality Date   • BREAST SURGERY      Jonatan breast bx's  benign   • CARDIAC CATHETERIZATION     • CHOLECYSTECTOMY     • DILATATION AND CURETTAGE      x2   • EYE SURGERY      cataracts   • SKIN CANCER EXCISION      Face   • THYROIDECTOMY Bilateral 10/12/2021    Procedure: NECK EXPLORATION WITH LEFT PARATHYROID ADENOMA EXCISION AND FROZEN SECTION, INTRAOPERATIVE INTACT PTH ASSAY, RIGHT THYROIDECTOMY WITH FROZEN SECTION; LEFT PARTIAL THYROIDECTOMY; RECURRENT LARYNGEAL NERVE MONITORING;  Surgeon: Kenji Farley MD;  [facility]: [facility];  Service: ENT;  Laterality: Bilateral;       Family History   Problem Relation Age of Onset   • Stroke Mother    • Heart disease Father        Social History     Socioeconomic History   • Marital status:    • Number of children: 1   Tobacco Use   • Smoking status: Never   • Smokeless tobacco: Never   Vaping Use   • Vaping Use: Never used   Substance and Sexual Activity   • Alcohol use: Never   • Drug use: Never   • Sexual activity: Defer           Objective   Physical Exam  Vitals and nursing note reviewed.   Constitutional:       Appearance: She is well-developed and normal weight.   HENT:      Head: Normocephalic and atraumatic.      Right Ear: External ear normal.      Left Ear: External ear normal.      Nose: Nose normal.      Mouth/Throat:      Mouth: Mucous membranes are moist.      Pharynx: Oropharynx is clear.   Eyes:      Extraocular Movements: Extraocular movements intact.      Pupils: Pupils are equal, round, and reactive to light.   Cardiovascular:      Rate and Rhythm: Normal rate and regular rhythm.      Pulses: Normal pulses.      Heart sounds: Normal heart sounds.   Pulmonary:      Effort: Pulmonary effort is normal.       Breath sounds: Normal breath sounds.   Abdominal:      General: Abdomen is flat.      Palpations: Abdomen is soft.      Tenderness: There is no abdominal tenderness.   Musculoskeletal:         General: Normal range of motion.      Cervical back: Normal range of motion and neck supple.   Skin:     General: Skin is warm and dry.   Neurological:      General: No focal deficit present.      Mental Status: She is alert and oriented to person, place, and time. Mental status is at baseline.   Psychiatric:         Mood and Affect: Mood normal.         Behavior: Behavior normal.         Thought Content: Thought content normal.         Judgment: Judgment normal.         Procedures           ED Course  ED Course as of 01/18/23 1451   Wed Jan 18, 2023   1444    IMPRESSION:  1.No evidence for significant nephrolithiasis. There is no evidence for obstructive uropathy.  2.No evidence for acute abnormality throughout the abdomen or pelvis on this noncontrast exam      The patient at this point has noncontrast CT scan it was largely unremarkable.  Her blood is actually improved but she still has blood in her urine.  Is now microscopic as opposed to gross hematuria.  The patient has followed up with urology. [CT]   1444 Urinalysis without microscopic (no culture) - Urine, Clean Catch(!) [CT]      ED Course User Index  [CT] Jose Manuel Mckee MD                                           Medical Decision Making  Dysuria    Hematuria    Patient does have follow-up established and work-up plan.  At this point do not feel there is any indication for any acute IV contrast CT scan.  I want the patient to get the correct study.  The patient is also scheduled for cystoscopy.  The patient was treated with Diflucan as well as Macrobid the patient will also be given Pyridium.  At this point there is no signs of pyelonephritis.  There is no signs of a renal mass or obstructing stone as the patient had a recent CAT scan.    This patient presents with  symptoms consistent with acute uncomplicated cystitis. No systemic symptoms. Not septic. Well appearing. Low suspicion for acute pyelonephritis given lack of fever, CVAT, or systemic features. Low suspicion for kidney stone or infected stone.  Low suspicion for ovarian torsion, PID, or appendicitis.    Amount and/or Complexity of Data Reviewed  External Data Reviewed: labs and radiology.  Labs:  Decision-making details documented in ED Course.  Discussion of management or test interpretation with external provider(s): Patient was seen here in the emergency room.  I did review the records.  I reviewed the CT report.       IMPRESSION:  1.No evidence for significant nephrolithiasis. There is no evidence for obstructive uropathy.  2.No evidence for acute abnormality throughout the abdomen or pelvis on this noncontrast exam        Final diagnoses:   Dysuria   Cystitis   Hematuria, unspecified type       ED Disposition  ED Disposition     ED Disposition   Discharge    Condition   Stable    Comment   --             Masood Braden, APRN  [address] [facility] [address]  [phone]    Schedule an appointment as soon as possible for a visit in 2 days      Your urologist as scheduled               Medication List      New Prescriptions    fluconazole 150 MG tablet  Commonly known as: DIFLUCAN  1 tablet p.o. initially then repeat in 1 week.     nitrofurantoin (macrocrystal-monohydrate) 100 MG capsule  Commonly known as: MACROBID  Take 1 capsule by mouth 2 (Two) Times a Day for 7 days.     phenazopyridine 200 MG tablet  Commonly known as: PYRIDIUM  Take 1 tablet by mouth 3 (Three) Times a Day As Needed for Bladder Spasms for up to 2 days.           Where to Get Your Medications      These medications were sent to [facility] #[id number] [place] - [address] [address] AT [facility] [phone] [facility] [phone] fx [address] [place] [address]    Phone:  [phone]   · fluconazole 150 MG tablet  · nitrofurantoin (macrocrystal-monohydrate) 100 MG capsule  · phenazopyridine 200 MG tablet

## 2023-03-06 ENCOUNTER — LAB (OUTPATIENT)
Dept: LAB | Facility: HOSPITAL | Age: 83
End: 2023-03-06
Payer: MEDICARE

## 2023-03-06 ENCOUNTER — TRANSCRIBE ORDERS (OUTPATIENT)
Dept: ADMINISTRATIVE | Facility: HOSPITAL | Age: 83
End: 2023-03-06
Payer: MEDICARE

## 2023-03-06 DIAGNOSIS — I10 HYPERTENSION, ESSENTIAL: ICD-10-CM

## 2023-03-06 DIAGNOSIS — N39.0 URINARY TRACT INFECTION WITHOUT HEMATURIA, SITE UNSPECIFIED: ICD-10-CM

## 2023-03-06 DIAGNOSIS — N18.9 CHRONIC KIDNEY DISEASE, UNSPECIFIED CKD STAGE: ICD-10-CM

## 2023-03-06 DIAGNOSIS — N18.9 CHRONIC KIDNEY DISEASE, UNSPECIFIED CKD STAGE: Primary | ICD-10-CM

## 2023-03-06 DIAGNOSIS — E87.5 HYPERPOTASSEMIA: ICD-10-CM

## 2023-03-06 LAB
ALBUMIN SERPL-MCNC: 4.6 G/DL (ref 3.5–5.2)
ANION GAP SERPL CALCULATED.3IONS-SCNC: 10.7 MMOL/L (ref 5–15)
BACTERIA UR QL AUTO: ABNORMAL /HPF
BILIRUB UR QL STRIP: NEGATIVE
BUN SERPL-MCNC: 24 MG/DL (ref 8–23)
BUN/CREAT SERPL: 23.5 (ref 7–25)
CALCIUM SPEC-SCNC: 9.9 MG/DL (ref 8.6–10.5)
CHLORIDE SERPL-SCNC: 101 MMOL/L (ref 98–107)
CLARITY UR: CLEAR
CO2 SERPL-SCNC: 28.3 MMOL/L (ref 22–29)
COLOR UR: YELLOW
CREAT SERPL-MCNC: 1.02 MG/DL (ref 0.57–1)
EGFRCR SERPLBLD CKD-EPI 2021: 55 ML/MIN/1.73
GLUCOSE SERPL-MCNC: 101 MG/DL (ref 65–99)
GLUCOSE UR STRIP-MCNC: NEGATIVE MG/DL
HGB UR QL STRIP.AUTO: NEGATIVE
HYALINE CASTS UR QL AUTO: ABNORMAL /LPF
KETONES UR QL STRIP: NEGATIVE
LEUKOCYTE ESTERASE UR QL STRIP.AUTO: ABNORMAL
NITRITE UR QL STRIP: NEGATIVE
PH UR STRIP.AUTO: 6.5 [PH] (ref 5–8)
PHOSPHATE SERPL-MCNC: 3.8 MG/DL (ref 2.5–4.5)
POTASSIUM SERPL-SCNC: 4 MMOL/L (ref 3.5–5.2)
PROT UR QL STRIP: NEGATIVE
RBC # UR STRIP: ABNORMAL /HPF
REF LAB TEST METHOD: ABNORMAL
SODIUM SERPL-SCNC: 140 MMOL/L (ref 136–145)
SP GR UR STRIP: 1.01 (ref 1–1.03)
SQUAMOUS #/AREA URNS HPF: ABNORMAL /HPF
UROBILINOGEN UR QL STRIP: ABNORMAL
WBC # UR STRIP: ABNORMAL /HPF

## 2023-03-06 PROCEDURE — 80069 RENAL FUNCTION PANEL: CPT

## 2023-03-06 PROCEDURE — 36415 COLL VENOUS BLD VENIPUNCTURE: CPT

## 2023-03-06 PROCEDURE — 87086 URINE CULTURE/COLONY COUNT: CPT

## 2023-03-06 PROCEDURE — 81001 URINALYSIS AUTO W/SCOPE: CPT

## 2023-03-07 LAB — BACTERIA SPEC AEROBE CULT: ABNORMAL

## 2023-05-31 NOTE — PROGRESS NOTES
"Chief Complaint  Diabetes (Follow up, med mgt, labs scanned in, )    Referred By: DONNIE Soto    Subjective     {Problem List  Visit Diagnosis   Encounters  Notes  Medications  Labs  Result Review Imaging  Media :23}     Kathryn More presents to [facility] for diabetes medication management    History of Present Illness    Visit type:  follow-up  Diabetes type:  Prediabetes  Current diabetes status/concerns/issues:  This patient was last seen in the office in November 2022.  Other health concerns: ***dry burning throat, she has had UTI for 5 months  Current Diabetes symptoms:    Polyuria: {Yes No:86845::\"No\"}   Polydipsia: {Yes No:60732::\"No\"}   Polyphagia: {Yes No:37024::\"No\"}   Blurred vision: {Yes No:98044::\"No\"}   Excessive fatigue: {Yes No:62447::\"No\"}   Known Diabetes complications:  Neuropathy: None; Location: N/A  Renal: Stage IIIa moderate (GFR = 45-59 mL/min  Eyes: None; Location: N/A  Amputation/Wounds: None  GI: None  Cardiovascular: Hypertension and Hyperlipidemia  ED: N/A  Other: None  Hypoglycemia:  {Hypoglycemia:40579}  Hypoglycemia Symptoms:  {Hypoglycemia Symptoms:92775}  Current diabetes treatment: She is not currently using any diabetes medication.  She is managing through diet and activity alone  Blood glucose device:  Meter  Blood glucose monitoring frequency:  Random/occasional  Blood glucose range/average:  ***     Glucose Source: {Glucose Source:90649::\"Patient Reported\"}  Diet:  {Diabetes Diet Plan:39085}  Activity/Exercise:  {DM Physical Activity:56529}    Past Medical History:   Diagnosis Date   • Arthritis    • Disease of thyroid gland     parathyroid nodules   • GERD (gastroesophageal reflux disease)    • Hyperlipidemia    • Hypertension    • Lung nodules    • Macular degeneration     of eyes   • PONV (postoperative nausea and vomiting)    • Skin cancer    • Sleep apnea     uses c pap   • Vertigo      Past Surgical History: "   Procedure Laterality Date   • BREAST SURGERY      Jonatan breast bx's  benign   • CARDIAC CATHETERIZATION     • CHOLECYSTECTOMY     • DILATATION AND CURETTAGE      x2   • EYE SURGERY      cataracts   • SKIN CANCER EXCISION      Face   • THYROIDECTOMY Bilateral 10/12/2021    Procedure: NECK EXPLORATION WITH LEFT PARATHYROID ADENOMA EXCISION AND FROZEN SECTION, INTRAOPERATIVE INTACT PTH ASSAY, RIGHT THYROIDECTOMY WITH FROZEN SECTION; LEFT PARTIAL THYROIDECTOMY; RECURRENT LARYNGEAL NERVE MONITORING;  Surgeon: Kenji Farley MD;  Location: [facility] MAIN OR;  Service: ENT;  Laterality: Bilateral;     Family History   Problem Relation Age of Onset   • Stroke Mother    • Heart disease Father      Social History     Socioeconomic History   • Marital status:    • Number of children: 1   Tobacco Use   • Smoking status: Never   • Smokeless tobacco: Never   Vaping Use   • Vaping Use: Never used   Substance and Sexual Activity   • Alcohol use: Never   • Drug use: Never   • Sexual activity: Defer     Allergies   Allergen Reactions   • Biaxin [Clarithromycin] GI Intolerance     Terrible stomach pain   • Epinephrine Unknown - High Severity     syncope   • Procaine Unknown - High Severity       Current Outpatient Medications:   •  Aflibercept (Eylea) 2 MG/0.05ML solution, by Intravitreal route Every 2 (Two) Months., Disp: , Rfl:   •  aspirin 81 MG chewable tablet, Chew 1 tablet Daily. Last dose 10/04/21 per pt, Disp: , Rfl:   •  Coenzyme Q10 (COQ-10 PO), Take 1 capsule by mouth Daily., Disp: , Rfl:   •  estradiol (VAGIFEM) 10 MCG tablet vaginal tablet, INSERT 1 TABLET VAGINALLY AT BEDTIME FOR 30 DAYS AS DIRECTED., Disp: , Rfl:   •  Glucosamine-Chondroitin 250-200 MG tablet, , Disp: , Rfl:   •  Lancets (Safety Lancet 28G/Pressure Act) misc, 1 each As Needed (test BG as needed once daily)., Disp: 50 each, Rfl: 2  •  magnesium oxide (MAG-OX) 400 MG tablet, magnesium oxide 400 mg magnesium oral tablet take 1 tablet by oral route  "daily   Active, Disp: , Rfl:   •  meclizine (ANTIVERT) 25 MG tablet, Take 1 tablet by mouth 3 (Three) Times a Day As Needed for Dizziness., Disp: , Rfl:   •  nitroglycerin (NITROSTAT) 0.4 MG SL tablet, Place 1 tablet under the tongue., Disp: , Rfl:   •  omeprazole (priLOSEC) 20 MG capsule, Take 1 capsule by mouth., Disp: , Rfl:   •  Probiotic Product (PROBIOTIC ADVANCED PO), Take 1 capsule by mouth Daily., Disp: , Rfl:   •  sertraline (ZOLOFT) 50 MG tablet, Take 1 tablet by mouth Every Night., Disp: , Rfl:   •  Synthroid 100 MCG tablet, Take 1 tablet by mouth Daily., Disp: , Rfl:   •  valsartan-hydrochlorothiazide (DIOVAN-HCT) 320-25 MG per tablet, Take 1 tablet by mouth Daily., Disp: , Rfl:   •  rosuvastatin (CRESTOR) 5 MG tablet, Take 5 mg by mouth., Disp: , Rfl:     Objective     Vitals:    06/02/23 1141   BP: 136/61   BP Location: Left arm   Patient Position: Sitting   Cuff Size: Adult   Pulse: 76   Temp: 97.6 °F (36.4 °C)   SpO2: 99%   Weight: 75.3 kg (166 lb)   Height: 160 cm (63\")   PainSc: 0-No pain     Body mass index is 29.41 kg/m².    Physical Exam  Constitutional:       Appearance: Normal appearance.      Comments: Overweight (BMI 25 - 29.9) Pt Current BMI = 29.41    HENT:      Head: Normocephalic and atraumatic.      Right Ear: External ear normal.      Left Ear: External ear normal.      Nose: Nose normal.   Eyes:      Extraocular Movements: Extraocular movements intact.      Conjunctiva/sclera: Conjunctivae normal.   Pulmonary:      Effort: Pulmonary effort is normal.   Musculoskeletal:         General: Normal range of motion.      Cervical back: Normal range of motion.   Skin:     General: Skin is warm and dry.   Neurological:      General: No focal deficit present.      Mental Status: She is alert and oriented to person, place, and time. Mental status is at baseline.   Psychiatric:         Mood and Affect: Mood normal.         Behavior: Behavior normal.         Thought Content: Thought content " "normal.         Judgment: Judgment normal.         {DIABETIC FOOT EXAM FOR  (Optional):16245::\"Diabetic Foot Exam Performed\":0}    Result Review :{Labs  Result Review  Imaging  Med Tab  Media :23}   The following data was reviewed by: DONNIE Noguera on 06/02/2023:    Most Recent A1C        8/12/2022    10:37   HGBA1C Most Recent   Hemoglobin A1C 6.0         A1C Last 3 Results        8/12/2022    10:37   HGBA1C Last 3 Results   Hemoglobin A1C 6.0       ***    Glucose   Date Value Ref Range Status   06/02/2023 124 (H) 70 - 99 mg/dL Final     Comment:     Serial Number: [id number]:  [id number]     Point-of-care glucose in the office today is within normal limits for nonfasting glucose    Creatinine   Date Value Ref Range Status   03/06/2023 1.02 (H) 0.57 - 1.00 mg/dL Final   08/31/2022 1.32 (H) 0.57 - 1.00 mg/dL Final   06/29/2018 1.0 0.7 - 1.5 mg/dL Final     eGFR   Date Value Ref Range Status   03/06/2023 55.0 (L) >60.0 mL/min/1.73 Final   08/31/2022 40.4 (L) >60.0 mL/min/1.73 Final     Comment:     National Kidney Foundation and American Society of Nephrology (ASN) Task Force recommended calculation based on the Chronic Kidney Disease Epidemiology Collaboration (CKD-EPI) equation refit without adjustment for race.     Labs collected on 3/6/2023 show stage IIIa renal disease     {CC Problem List  Visit Diagnosis  ROS  Review (Popup)  [facility] Maintenance  Quality  BestPractice  Medications  SmartSets  SnapShot Encounters  Media :23}     Assessment: ***      Diagnoses and all orders for this visit:    1. Prediabetes (Primary)  -     Cancel: POC Glycosylated Hemoglobin (Hb A1C)    Other orders  -     POC Glucose        Plan: ***    The patient will monitor her blood glucose levels ***.  If she develops problematic hyperglycemia or hypoglycemia or adverse drug reactions, she will contact the office for further instructions.    {Time Spent (Optional):00612}    Follow Up {Instructions " Charge Capture  Follow-up Communications :23}    No follow-ups on file.    Patient was given instructions and counseling regarding her condition or for health maintenance advice. Please see specific information pulled into the AVS if appropriate.     Malu Shelley, DONNIE  06/02/2023      Dictated Utilizing Dragon Dictation.  Please note that portions of this note were completed with a voice recognition program.  Part of this note may be an electronic transcription/translation of spoken language to printed text using the Dragon Dictation System.

## 2023-06-02 ENCOUNTER — OFFICE VISIT (OUTPATIENT)
Dept: DIABETES SERVICES | Facility: HOSPITAL | Age: 83
End: 2023-06-02
Payer: MEDICARE

## 2023-06-02 VITALS
SYSTOLIC BLOOD PRESSURE: 136 MMHG | HEART RATE: 76 BPM | WEIGHT: 166 LBS | HEIGHT: 63 IN | BODY MASS INDEX: 29.41 KG/M2 | TEMPERATURE: 97.6 F | OXYGEN SATURATION: 99 % | DIASTOLIC BLOOD PRESSURE: 61 MMHG

## 2023-06-02 DIAGNOSIS — R73.03 PREDIABETES: Primary | ICD-10-CM

## 2023-06-02 DIAGNOSIS — N18.32 STAGE 3B CHRONIC KIDNEY DISEASE: ICD-10-CM

## 2023-06-02 LAB — GLUCOSE BLDC GLUCOMTR-MCNC: 124 MG/DL (ref 70–99)

## 2023-06-02 PROCEDURE — G0463 HOSPITAL OUTPT CLINIC VISIT: HCPCS | Performed by: NURSE PRACTITIONER

## 2023-06-02 PROCEDURE — 3078F DIAST BP <80 MM HG: CPT | Performed by: NURSE PRACTITIONER

## 2023-06-02 PROCEDURE — 3075F SYST BP GE 130 - 139MM HG: CPT | Performed by: NURSE PRACTITIONER

## 2023-06-02 PROCEDURE — 1160F RVW MEDS BY RX/DR IN RCRD: CPT | Performed by: NURSE PRACTITIONER

## 2023-06-02 PROCEDURE — 82948 REAGENT STRIP/BLOOD GLUCOSE: CPT | Performed by: NURSE PRACTITIONER

## 2023-06-02 PROCEDURE — 1159F MED LIST DOCD IN RCRD: CPT | Performed by: NURSE PRACTITIONER

## 2023-06-02 PROCEDURE — 99214 OFFICE O/P EST MOD 30 MIN: CPT | Performed by: NURSE PRACTITIONER

## 2023-06-02 RX ORDER — FINERENONE 10 MG/1
10 TABLET, FILM COATED ORAL DAILY
Qty: 30 TABLET | Refills: 1 | Status: SHIPPED | OUTPATIENT
Start: 2023-06-02 | End: 2023-08-01

## 2023-06-05 ENCOUNTER — TELEPHONE (OUTPATIENT)
Dept: DIABETES SERVICES | Facility: HOSPITAL | Age: 83
End: 2023-06-05
Payer: MEDICARE

## 2023-06-05 NOTE — TELEPHONE ENCOUNTER
Kathryn Miles (Key: [id number])  Rx #: [id number]  Kerendia 10MG tablets PA         DENIED- 6-5-23 DENIAL PLACED ON PROVIDERS DESK TO ADVISE

## 2023-06-08 ENCOUNTER — TELEPHONE (OUTPATIENT)
Dept: DIABETES SERVICES | Facility: HOSPITAL | Age: 83
End: 2023-06-08
Payer: MEDICARE

## 2023-06-08 NOTE — TELEPHONE ENCOUNTER
[facility] APPEAL SUBMITTED 6-8-23  72 HOURS TURN AROUND TIME, WILL FAX RESPONSE    [phone]  [id number] NUM  [phone]

## 2023-09-11 ENCOUNTER — LAB (OUTPATIENT)
Dept: LAB | Facility: HOSPITAL | Age: 83
End: 2023-09-11
Payer: MEDICARE

## 2023-09-11 ENCOUNTER — TRANSCRIBE ORDERS (OUTPATIENT)
Dept: ADMINISTRATIVE | Facility: HOSPITAL | Age: 83
End: 2023-09-11
Payer: MEDICARE

## 2023-09-11 DIAGNOSIS — N39.0 URINARY TRACT INFECTION WITHOUT HEMATURIA, SITE UNSPECIFIED: ICD-10-CM

## 2023-09-11 DIAGNOSIS — E87.5 HYPERPOTASSEMIA: ICD-10-CM

## 2023-09-11 DIAGNOSIS — I10 ESSENTIAL HYPERTENSION, MALIGNANT: ICD-10-CM

## 2023-09-11 DIAGNOSIS — N18.30 STAGE 3 CHRONIC KIDNEY DISEASE, UNSPECIFIED WHETHER STAGE 3A OR 3B CKD: Primary | ICD-10-CM

## 2023-09-11 DIAGNOSIS — N18.30 STAGE 3 CHRONIC KIDNEY DISEASE, UNSPECIFIED WHETHER STAGE 3A OR 3B CKD: ICD-10-CM

## 2023-09-11 LAB
ALBUMIN SERPL-MCNC: 4.4 G/DL (ref 3.5–5.2)
ANION GAP SERPL CALCULATED.3IONS-SCNC: 11.5 MMOL/L (ref 5–15)
BACTERIA UR QL AUTO: ABNORMAL /HPF
BILIRUB UR QL STRIP: NEGATIVE
BUN SERPL-MCNC: 19 MG/DL (ref 8–23)
BUN/CREAT SERPL: 18.4 (ref 7–25)
CALCIUM SPEC-SCNC: 9.4 MG/DL (ref 8.6–10.5)
CHLORIDE SERPL-SCNC: 98 MMOL/L (ref 98–107)
CLARITY UR: CLEAR
CO2 SERPL-SCNC: 28.5 MMOL/L (ref 22–29)
COLOR UR: ABNORMAL
CREAT SERPL-MCNC: 1.03 MG/DL (ref 0.57–1)
EGFRCR SERPLBLD CKD-EPI 2021: 54.1 ML/MIN/1.73
GLUCOSE SERPL-MCNC: 97 MG/DL (ref 65–99)
GLUCOSE UR STRIP-MCNC: NEGATIVE MG/DL
HGB UR QL STRIP.AUTO: NEGATIVE
HYALINE CASTS UR QL AUTO: ABNORMAL /LPF
KETONES UR QL STRIP: NEGATIVE
LEUKOCYTE ESTERASE UR QL STRIP.AUTO: ABNORMAL
NITRITE UR QL STRIP: NEGATIVE
PH UR STRIP.AUTO: 7 [PH] (ref 5–8)
PHOSPHATE SERPL-MCNC: 3.3 MG/DL (ref 2.5–4.5)
POTASSIUM SERPL-SCNC: 4.1 MMOL/L (ref 3.5–5.2)
PROT UR QL STRIP: NEGATIVE
RBC # UR STRIP: ABNORMAL /HPF
REF LAB TEST METHOD: ABNORMAL
SODIUM SERPL-SCNC: 138 MMOL/L (ref 136–145)
SP GR UR STRIP: 1.01 (ref 1–1.03)
SQUAMOUS #/AREA URNS HPF: ABNORMAL /HPF
UROBILINOGEN UR QL STRIP: ABNORMAL
WBC # UR STRIP: ABNORMAL /HPF

## 2023-09-11 PROCEDURE — 80069 RENAL FUNCTION PANEL: CPT

## 2023-09-11 PROCEDURE — 36415 COLL VENOUS BLD VENIPUNCTURE: CPT

## 2023-09-11 PROCEDURE — 81001 URINALYSIS AUTO W/SCOPE: CPT

## 2023-10-26 ENCOUNTER — TRANSCRIBE ORDERS (OUTPATIENT)
Dept: LAB | Facility: HOSPITAL | Age: 83
End: 2023-10-26
Payer: MEDICARE

## 2023-10-26 ENCOUNTER — LAB (OUTPATIENT)
Dept: LAB | Facility: HOSPITAL | Age: 83
End: 2023-10-26
Payer: MEDICARE

## 2023-10-26 DIAGNOSIS — F32.A DEPRESSIVE TYPE PSYCHOSIS: ICD-10-CM

## 2023-10-26 DIAGNOSIS — E78.2 MIXED HYPERLIPIDEMIA: ICD-10-CM

## 2023-10-26 DIAGNOSIS — R00.2 PALPITATIONS: ICD-10-CM

## 2023-10-26 DIAGNOSIS — I51.9 MYXEDEMA HEART DISEASE: Primary | ICD-10-CM

## 2023-10-26 DIAGNOSIS — J30.1 ALLERGIC RHINITIS DUE TO POLLEN, UNSPECIFIED SEASONALITY: ICD-10-CM

## 2023-10-26 DIAGNOSIS — I34.1 MITRAL VALVE PROLAPSE: ICD-10-CM

## 2023-10-26 DIAGNOSIS — I10 ESSENTIAL HYPERTENSION, MALIGNANT: ICD-10-CM

## 2023-10-26 DIAGNOSIS — E03.9 MYXEDEMA HEART DISEASE: ICD-10-CM

## 2023-10-26 DIAGNOSIS — B95.5 STREPTOCOCCAL LARYNGITIS: ICD-10-CM

## 2023-10-26 DIAGNOSIS — I51.9 MYXEDEMA HEART DISEASE: ICD-10-CM

## 2023-10-26 DIAGNOSIS — J04.0 STREPTOCOCCAL LARYNGITIS: ICD-10-CM

## 2023-10-26 DIAGNOSIS — N39.0 URINARY TRACT INFECTION WITHOUT HEMATURIA, SITE UNSPECIFIED: ICD-10-CM

## 2023-10-26 DIAGNOSIS — Z71.2 ENCOUNTER TO DISCUSS TEST RESULTS: ICD-10-CM

## 2023-10-26 DIAGNOSIS — E03.9 MYXEDEMA HEART DISEASE: Primary | ICD-10-CM

## 2023-10-26 LAB
ALBUMIN SERPL-MCNC: 4.2 G/DL (ref 3.5–5.2)
ALBUMIN UR-MCNC: 3.1 MG/DL
ALBUMIN/GLOB SERPL: 1.6 G/DL
ALP SERPL-CCNC: 82 U/L (ref 39–117)
ALT SERPL W P-5'-P-CCNC: 18 U/L (ref 1–33)
ANION GAP SERPL CALCULATED.3IONS-SCNC: 13.1 MMOL/L (ref 5–15)
AST SERPL-CCNC: 17 U/L (ref 1–32)
BACTERIA UR QL AUTO: ABNORMAL /HPF
BASOPHILS # BLD AUTO: 0.03 10*3/MM3 (ref 0–0.2)
BASOPHILS NFR BLD AUTO: 0.3 % (ref 0–1.5)
BILIRUB SERPL-MCNC: 0.4 MG/DL (ref 0–1.2)
BILIRUB UR QL STRIP: NEGATIVE
BUN SERPL-MCNC: 32 MG/DL (ref 8–23)
BUN/CREAT SERPL: 30.8 (ref 7–25)
CALCIUM SPEC-SCNC: 9.1 MG/DL (ref 8.6–10.5)
CHLORIDE SERPL-SCNC: 95 MMOL/L (ref 98–107)
CHOLEST SERPL-MCNC: 166 MG/DL (ref 0–200)
CLARITY UR: CLEAR
CO2 SERPL-SCNC: 25.9 MMOL/L (ref 22–29)
COLOR UR: YELLOW
CREAT SERPL-MCNC: 1.04 MG/DL (ref 0.57–1)
CREAT UR-MCNC: 99 MG/DL
DEPRECATED RDW RBC AUTO: 40.3 FL (ref 37–54)
EGFRCR SERPLBLD CKD-EPI 2021: 53.4 ML/MIN/1.73
EOSINOPHIL # BLD AUTO: 0.06 10*3/MM3 (ref 0–0.4)
EOSINOPHIL NFR BLD AUTO: 0.6 % (ref 0.3–6.2)
ERYTHROCYTE [DISTWIDTH] IN BLOOD BY AUTOMATED COUNT: 13.2 % (ref 12.3–15.4)
GLOBULIN UR ELPH-MCNC: 2.7 GM/DL
GLUCOSE SERPL-MCNC: 85 MG/DL (ref 65–99)
GLUCOSE UR STRIP-MCNC: NEGATIVE MG/DL
HBA1C MFR BLD: 6.1 % (ref 4.8–5.6)
HCT VFR BLD AUTO: 37.5 % (ref 34–46.6)
HDLC SERPL-MCNC: 72 MG/DL (ref 40–60)
HGB BLD-MCNC: 12.7 G/DL (ref 12–15.9)
HGB UR QL STRIP.AUTO: NEGATIVE
HOLD SPECIMEN: NORMAL
HYALINE CASTS UR QL AUTO: ABNORMAL /LPF
IMM GRANULOCYTES # BLD AUTO: 0.06 10*3/MM3 (ref 0–0.05)
IMM GRANULOCYTES NFR BLD AUTO: 0.6 % (ref 0–0.5)
KETONES UR QL STRIP: NEGATIVE
LDLC SERPL CALC-MCNC: 79 MG/DL (ref 0–100)
LDLC/HDLC SERPL: 1.08 {RATIO}
LEUKOCYTE ESTERASE UR QL STRIP.AUTO: ABNORMAL
LYMPHOCYTES # BLD AUTO: 2.09 10*3/MM3 (ref 0.7–3.1)
LYMPHOCYTES NFR BLD AUTO: 20.9 % (ref 19.6–45.3)
MCH RBC QN AUTO: 28.5 PG (ref 26.6–33)
MCHC RBC AUTO-ENTMCNC: 33.9 G/DL (ref 31.5–35.7)
MCV RBC AUTO: 84.3 FL (ref 79–97)
MICROALBUMIN/CREAT UR: 31.3 MG/G (ref 0–29)
MONOCYTES # BLD AUTO: 0.82 10*3/MM3 (ref 0.1–0.9)
MONOCYTES NFR BLD AUTO: 8.2 % (ref 5–12)
NEUTROPHILS NFR BLD AUTO: 6.96 10*3/MM3 (ref 1.7–7)
NEUTROPHILS NFR BLD AUTO: 69.4 % (ref 42.7–76)
NITRITE UR QL STRIP: NEGATIVE
NRBC BLD AUTO-RTO: 0 /100 WBC (ref 0–0.2)
PH UR STRIP.AUTO: 6 [PH] (ref 5–8)
PLATELET # BLD AUTO: 426 10*3/MM3 (ref 140–450)
PMV BLD AUTO: 9 FL (ref 6–12)
POTASSIUM SERPL-SCNC: 4.2 MMOL/L (ref 3.5–5.2)
PROT SERPL-MCNC: 6.9 G/DL (ref 6–8.5)
PROT UR QL STRIP: NEGATIVE
RBC # BLD AUTO: 4.45 10*6/MM3 (ref 3.77–5.28)
RBC # UR STRIP: ABNORMAL /HPF
REF LAB TEST METHOD: ABNORMAL
SODIUM SERPL-SCNC: 134 MMOL/L (ref 136–145)
SP GR UR STRIP: 1.02 (ref 1–1.03)
SQUAMOUS #/AREA URNS HPF: ABNORMAL /HPF
TRIGL SERPL-MCNC: 82 MG/DL (ref 0–150)
TSH SERPL DL<=0.05 MIU/L-ACNC: 0.34 UIU/ML (ref 0.27–4.2)
URATE SERPL-MCNC: 6.4 MG/DL (ref 2.4–5.7)
UROBILINOGEN UR QL STRIP: ABNORMAL
VLDLC SERPL-MCNC: 15 MG/DL (ref 5–40)
WBC # UR STRIP: ABNORMAL /HPF
WBC NRBC COR # BLD: 10.02 10*3/MM3 (ref 3.4–10.8)

## 2023-10-26 PROCEDURE — 36415 COLL VENOUS BLD VENIPUNCTURE: CPT

## 2023-10-26 PROCEDURE — 87186 SC STD MICRODIL/AGAR DIL: CPT

## 2023-10-26 PROCEDURE — 81001 URINALYSIS AUTO W/SCOPE: CPT

## 2023-10-26 PROCEDURE — 84443 ASSAY THYROID STIM HORMONE: CPT

## 2023-10-26 PROCEDURE — 82570 ASSAY OF URINE CREATININE: CPT

## 2023-10-26 PROCEDURE — 80061 LIPID PANEL: CPT

## 2023-10-26 PROCEDURE — 87086 URINE CULTURE/COLONY COUNT: CPT

## 2023-10-26 PROCEDURE — 83036 HEMOGLOBIN GLYCOSYLATED A1C: CPT

## 2023-10-26 PROCEDURE — 84550 ASSAY OF BLOOD/URIC ACID: CPT

## 2023-10-26 PROCEDURE — 82043 UR ALBUMIN QUANTITATIVE: CPT

## 2023-10-26 PROCEDURE — 85025 COMPLETE CBC W/AUTO DIFF WBC: CPT

## 2023-10-26 PROCEDURE — 80053 COMPREHEN METABOLIC PANEL: CPT

## 2023-10-26 PROCEDURE — 87077 CULTURE AEROBIC IDENTIFY: CPT

## 2023-10-28 LAB — BACTERIA SPEC AEROBE CULT: ABNORMAL

## 2023-11-21 ENCOUNTER — TRANSCRIBE ORDERS (OUTPATIENT)
Dept: ADMINISTRATIVE | Facility: HOSPITAL | Age: 83
End: 2023-11-21
Payer: MEDICARE

## 2023-11-21 DIAGNOSIS — Z12.31 BREAST CANCER SCREENING BY MAMMOGRAM: Primary | ICD-10-CM

## 2023-12-06 ENCOUNTER — HOSPITAL ENCOUNTER (OUTPATIENT)
Dept: MAMMOGRAPHY | Facility: HOSPITAL | Age: 83
Discharge: HOME | End: 2023-12-06
Admitting: INTERNAL MEDICINE
Payer: MEDICARE

## 2023-12-06 DIAGNOSIS — Z12.31 BREAST CANCER SCREENING BY MAMMOGRAM: ICD-10-CM

## 2023-12-06 PROCEDURE — 77067 SCR MAMMO BI INCL CAD: CPT

## 2023-12-06 PROCEDURE — 77063 BREAST TOMOSYNTHESIS BI: CPT

## 2023-12-28 ENCOUNTER — TRANSCRIBE ORDERS (OUTPATIENT)
Dept: ADMINISTRATIVE | Facility: HOSPITAL | Age: 83
End: 2023-12-28
Payer: MEDICARE

## 2023-12-28 DIAGNOSIS — E03.9 PRIMARY HYPOTHYROIDISM: Primary | ICD-10-CM

## 2024-01-03 ENCOUNTER — HOSPITAL ENCOUNTER (OUTPATIENT)
Dept: ULTRASOUND IMAGING | Facility: HOSPITAL | Age: 84
Discharge: HOME | End: 2024-01-03
Admitting: INTERNAL MEDICINE
Payer: MEDICARE

## 2024-01-03 DIAGNOSIS — E03.9 PRIMARY HYPOTHYROIDISM: ICD-10-CM

## 2024-01-03 PROCEDURE — 76536 US EXAM OF HEAD AND NECK: CPT

## 2024-03-08 ENCOUNTER — TRANSCRIBE ORDERS (OUTPATIENT)
Dept: LAB | Facility: HOSPITAL | Age: 84
End: 2024-03-08
Payer: MEDICARE

## 2024-03-08 ENCOUNTER — LAB (OUTPATIENT)
Dept: LAB | Facility: HOSPITAL | Age: 84
End: 2024-03-08
Payer: MEDICARE

## 2024-03-08 DIAGNOSIS — N18.1 STAGE 1 CHRONIC KIDNEY DISEASE: ICD-10-CM

## 2024-03-08 DIAGNOSIS — N18.1 STAGE 1 CHRONIC KIDNEY DISEASE: Primary | ICD-10-CM

## 2024-03-08 LAB
ALBUMIN SERPL-MCNC: 4.1 G/DL (ref 3.5–5.2)
ALBUMIN UR-MCNC: 19.1 MG/DL
ANION GAP SERPL CALCULATED.3IONS-SCNC: 13.6 MMOL/L (ref 5–15)
BACTERIA UR QL AUTO: ABNORMAL /HPF
BILIRUB UR QL STRIP: NEGATIVE
BUN SERPL-MCNC: 21 MG/DL (ref 8–23)
BUN/CREAT SERPL: 21.4 (ref 7–25)
CALCIUM SPEC-SCNC: 9.6 MG/DL (ref 8.6–10.5)
CHLORIDE SERPL-SCNC: 93 MMOL/L (ref 98–107)
CLARITY UR: CLEAR
CO2 SERPL-SCNC: 27.4 MMOL/L (ref 22–29)
COLOR UR: YELLOW
CREAT SERPL-MCNC: 0.98 MG/DL (ref 0.57–1)
CREAT UR-MCNC: 163.5 MG/DL
EGFRCR SERPLBLD CKD-EPI 2021: 57.4 ML/MIN/1.73
GLUCOSE SERPL-MCNC: 106 MG/DL (ref 65–99)
GLUCOSE UR STRIP-MCNC: NEGATIVE MG/DL
HGB UR QL STRIP.AUTO: NEGATIVE
HOLD SPECIMEN: NORMAL
HYALINE CASTS UR QL AUTO: ABNORMAL /LPF
KETONES UR QL STRIP: ABNORMAL
LEUKOCYTE ESTERASE UR QL STRIP.AUTO: ABNORMAL
MICROALBUMIN/CREAT UR: 116.8 MG/G (ref 0–29)
NITRITE UR QL STRIP: NEGATIVE
PH UR STRIP.AUTO: 6.5 [PH] (ref 5–8)
PHOSPHATE SERPL-MCNC: 4.1 MG/DL (ref 2.5–4.5)
POTASSIUM SERPL-SCNC: 4.1 MMOL/L (ref 3.5–5.2)
PROT UR QL STRIP: ABNORMAL
RBC # UR STRIP: ABNORMAL /HPF
REF LAB TEST METHOD: ABNORMAL
SODIUM SERPL-SCNC: 134 MMOL/L (ref 136–145)
SP GR UR STRIP: 1.02 (ref 1–1.03)
SQUAMOUS #/AREA URNS HPF: ABNORMAL /HPF
UROBILINOGEN UR QL STRIP: ABNORMAL
WBC # UR STRIP: ABNORMAL /HPF

## 2024-03-08 PROCEDURE — 36415 COLL VENOUS BLD VENIPUNCTURE: CPT

## 2024-03-08 PROCEDURE — 81001 URINALYSIS AUTO W/SCOPE: CPT

## 2024-03-08 PROCEDURE — 82043 UR ALBUMIN QUANTITATIVE: CPT

## 2024-03-08 PROCEDURE — 82570 ASSAY OF URINE CREATININE: CPT

## 2024-03-08 PROCEDURE — 80069 RENAL FUNCTION PANEL: CPT

## 2024-07-03 ENCOUNTER — HOSPITAL ENCOUNTER (OUTPATIENT)
Facility: HOSPITAL | Age: 84
Discharge: HOME | End: 2024-07-03
Attending: EMERGENCY MEDICINE | Admitting: EMERGENCY MEDICINE
Payer: MEDICARE

## 2024-07-03 VITALS
SYSTOLIC BLOOD PRESSURE: 168 MMHG | DIASTOLIC BLOOD PRESSURE: 80 MMHG | BODY MASS INDEX: 29.77 KG/M2 | RESPIRATION RATE: 18 BRPM | OXYGEN SATURATION: 98 % | WEIGHT: 167.99 LBS | TEMPERATURE: 97.9 F | HEART RATE: 80 BPM | HEIGHT: 63 IN

## 2024-07-03 DIAGNOSIS — T63.444A BEE STING, UNDETERMINED INTENT, INITIAL ENCOUNTER: Primary | ICD-10-CM

## 2024-07-03 PROCEDURE — 99212 OFFICE O/P EST SF 10 MIN: CPT | Performed by: EMERGENCY MEDICINE

## 2024-07-03 PROCEDURE — G0463 HOSPITAL OUTPT CLINIC VISIT: HCPCS | Performed by: EMERGENCY MEDICINE

## 2024-07-04 NOTE — FSED PROVIDER NOTE
Subjective   History of Present Illness    4-year-old woman presents emergency department with bee sting 3 days ago.  The redness got worse.  Stung twice once on the forehead once in the ear.  The ear no change in hearing.  She has a little swelling.  She thought she needed a cortisone shot because she might of had 1 in the past however she is a prediabetic and has multiple medical comorbidities.    No symptoms consistent with anaphylaxis.  No consistent symptoms consistent with infection.  No fever chills nausea vomiting dizziness    Review of Systems    All systems negative except as otherwise mentioned in the HPI    Past Medical History:   Diagnosis Date    Arthritis     Disease of thyroid gland     parathyroid nodules    GERD (gastroesophageal reflux disease)     Hyperlipidemia     Hypertension     Lung nodules     Macular degeneration     of eyes    PONV (postoperative nausea and vomiting)     Skin cancer     Sleep apnea     uses c pap    Vertigo        Allergies   Allergen Reactions    Biaxin [Clarithromycin] GI Intolerance     Terrible stomach pain    Epinephrine Unknown - High Severity     syncope    Procaine Unknown - High Severity       Past Surgical History:   Procedure Laterality Date    BREAST SURGERY      Jonatan breast bx's  benign    CARDIAC CATHETERIZATION      CHOLECYSTECTOMY      DILATATION AND CURETTAGE      x2    EYE SURGERY      cataracts    SKIN CANCER EXCISION      Face    THYROIDECTOMY Bilateral 10/12/2021    Procedure: NECK EXPLORATION WITH LEFT PARATHYROID ADENOMA EXCISION AND FROZEN SECTION, INTRAOPERATIVE INTACT PTH ASSAY, RIGHT THYROIDECTOMY WITH FROZEN SECTION; LEFT PARTIAL THYROIDECTOMY; RECURRENT LARYNGEAL NERVE MONITORING;  Surgeon: Kenji Farley MD;  Location: [facility] MAIN OR;  Service: ENT;  Laterality: Bilateral;       Family History   Problem Relation Age of Onset    Stroke Mother     Heart disease Father        Social History     Socioeconomic History    Marital status:      "Number of children: 1   Tobacco Use    Smoking status: Never    Smokeless tobacco: Never   Vaping Use    Vaping status: Never Used   Substance and Sexual Activity    Alcohol use: Yes     Alcohol/week: 2.0 standard drinks of alcohol     Types: 2 Shots of liquor per week    Drug use: Never    Sexual activity: Not Currently           Objective   Physical Exam    General: Alert and oriented, conversant  Eye: PERRL, EOMI, nomal conjunctiva  HENT: Normocephalic, normal hearing, moist oral mucosa    Lungs: Nonlabored respiration, no wheezing  Heart: Normal Rate, no mumurs gallops or rubs  Abdomen: Soft, Non tender, no peritoneal signs    Musculoskeletal: Normal range of motion and strength, no tenderness or swelling  Skin: Warm and dry, no alarming rashes  Neurologic: Awake, responsive, moving all extremities, no focal deficits  Psychiatric:  Cooperative, appropriate mood and affect      There is a small area of redness on the forehead and a small area of redness on the ear.  No fluctuance or abscess no signs of infection there is some induration no real signs of infection    Procedures           ED Course                                           Medical Decision Making  Risk  Prescription drug management.    84-year-old woman presents emergency department with 2 bee stings.  She is 48 hours out.  No signs of infection.  She does not want any antibiotics think it is okay.  She was talking about \"injection\" however given her age and her diabetes status I do not want to give her prednisone or Decadron for what is really just inflammatory changes after a bee sting.  She is hemodynamically stable looks well no anaphylaxis and no superseding infection.    She is given hydrocortisone 2% and she will follow-up with her primary doctor    Final diagnoses:   None       ED Disposition  ED Disposition       None            No follow-up provider specified.       Medication List        New Prescriptions      Hydrocortisone 2 % " cream  Apply 1 Application topically Daily.               Where to Get Your Medications        These medications were sent to [id number] - [place], [place]  [address] [address] AT [facility] [address] - [phone]  - [phone]   [address] [place] [address] [place] [address]      Phone: [phone]   Hydrocortisone 2 % cream

## 2024-08-07 ENCOUNTER — TRANSCRIBE ORDERS (OUTPATIENT)
Dept: ADMINISTRATIVE | Facility: HOSPITAL | Age: 84
End: 2024-08-07
Payer: MEDICARE

## 2024-08-07 ENCOUNTER — LAB (OUTPATIENT)
Dept: LAB | Facility: HOSPITAL | Age: 84
End: 2024-08-07
Payer: MEDICARE

## 2024-08-07 DIAGNOSIS — L50.0 ALLERGIC URTICARIA: ICD-10-CM

## 2024-08-07 DIAGNOSIS — L50.0 ALLERGIC URTICARIA: Primary | ICD-10-CM

## 2024-08-07 LAB — IGA1 MFR SER: 206 MG/DL (ref 70–400)

## 2024-08-07 PROCEDURE — 82784 ASSAY IGA/IGD/IGG/IGM EACH: CPT

## 2024-08-07 PROCEDURE — 86364 TISS TRNSGLTMNASE EA IG CLAS: CPT

## 2024-08-07 PROCEDURE — 36415 COLL VENOUS BLD VENIPUNCTURE: CPT

## 2024-08-08 LAB — TTG IGA SER-ACNC: <2 U/ML (ref 0–3)

## 2024-11-26 ENCOUNTER — APPOINTMENT (OUTPATIENT)
Dept: GENERAL RADIOLOGY | Facility: HOSPITAL | Age: 84
End: 2024-11-26
Payer: MEDICARE

## 2024-11-26 ENCOUNTER — HOSPITAL ENCOUNTER (EMERGENCY)
Facility: HOSPITAL | Age: 84
Discharge: HOME | End: 2024-11-26
Attending: EMERGENCY MEDICINE | Admitting: EMERGENCY MEDICINE
Payer: MEDICARE

## 2024-11-26 VITALS
BODY MASS INDEX: 30.12 KG/M2 | TEMPERATURE: 98.8 F | RESPIRATION RATE: 18 BRPM | HEIGHT: 63 IN | WEIGHT: 170 LBS | SYSTOLIC BLOOD PRESSURE: 165 MMHG | OXYGEN SATURATION: 97 % | HEART RATE: 76 BPM | DIASTOLIC BLOOD PRESSURE: 68 MMHG

## 2024-11-26 DIAGNOSIS — J06.9 ACUTE UPPER RESPIRATORY INFECTION: Primary | ICD-10-CM

## 2024-11-26 LAB
ALBUMIN SERPL-MCNC: 4.2 G/DL (ref 3.5–5.2)
ALBUMIN/GLOB SERPL: 1.4 G/DL
ALP SERPL-CCNC: 111 U/L (ref 39–117)
ALT SERPL W P-5'-P-CCNC: 13 U/L (ref 1–33)
ANION GAP SERPL CALCULATED.3IONS-SCNC: 9.2 MMOL/L (ref 5–15)
AST SERPL-CCNC: 14 U/L (ref 1–32)
BASOPHILS # BLD AUTO: 0.06 10*3/MM3 (ref 0–0.2)
BASOPHILS NFR BLD AUTO: 0.5 % (ref 0–1.5)
BILIRUB SERPL-MCNC: 0.3 MG/DL (ref 0–1.2)
BUN SERPL-MCNC: 19 MG/DL (ref 8–23)
BUN/CREAT SERPL: 20 (ref 7–25)
CALCIUM SPEC-SCNC: 9.4 MG/DL (ref 8.6–10.5)
CHLORIDE SERPL-SCNC: 96 MMOL/L (ref 98–107)
CO2 SERPL-SCNC: 29.8 MMOL/L (ref 22–29)
CREAT SERPL-MCNC: 0.95 MG/DL (ref 0.57–1)
DEPRECATED RDW RBC AUTO: 41.7 FL (ref 37–54)
EGFRCR SERPLBLD CKD-EPI 2021: 59.2 ML/MIN/1.73
EOSINOPHIL # BLD AUTO: 0.21 10*3/MM3 (ref 0–0.4)
EOSINOPHIL NFR BLD AUTO: 1.7 % (ref 0.3–6.2)
ERYTHROCYTE [DISTWIDTH] IN BLOOD BY AUTOMATED COUNT: 13.1 % (ref 12.3–15.4)
FLUAV SUBTYP SPEC NAA+PROBE: NOT DETECTED
FLUBV RNA ISLT QL NAA+PROBE: NOT DETECTED
GLOBULIN UR ELPH-MCNC: 2.9 GM/DL
GLUCOSE SERPL-MCNC: 110 MG/DL (ref 65–99)
HCT VFR BLD AUTO: 41.1 % (ref 34–46.6)
HGB BLD-MCNC: 13.2 G/DL (ref 12–15.9)
IMM GRANULOCYTES # BLD AUTO: 0.01 10*3/MM3 (ref 0–0.05)
IMM GRANULOCYTES NFR BLD AUTO: 0.1 % (ref 0–0.5)
LYMPHOCYTES # BLD AUTO: 1.41 10*3/MM3 (ref 0.7–3.1)
LYMPHOCYTES NFR BLD AUTO: 11.7 % (ref 19.6–45.3)
MCH RBC QN AUTO: 27.7 PG (ref 26.6–33)
MCHC RBC AUTO-ENTMCNC: 32.1 G/DL (ref 31.5–35.7)
MCV RBC AUTO: 86.2 FL (ref 79–97)
MONOCYTES # BLD AUTO: 1.27 10*3/MM3 (ref 0.1–0.9)
MONOCYTES NFR BLD AUTO: 10.6 % (ref 5–12)
NEUTROPHILS NFR BLD AUTO: 75.4 % (ref 42.7–76)
NEUTROPHILS NFR BLD AUTO: 9.06 10*3/MM3 (ref 1.7–7)
PLATELET # BLD AUTO: 337 10*3/MM3 (ref 140–450)
PMV BLD AUTO: 9.4 FL (ref 6–12)
POTASSIUM SERPL-SCNC: 3.4 MMOL/L (ref 3.5–5.2)
PROT SERPL-MCNC: 7.1 G/DL (ref 6–8.5)
RBC # BLD AUTO: 4.77 10*6/MM3 (ref 3.77–5.28)
SARS-COV-2 RNA RESP QL NAA+PROBE: NOT DETECTED
SODIUM SERPL-SCNC: 135 MMOL/L (ref 136–145)
STREP A PCR: NOT DETECTED
WBC NRBC COR # BLD AUTO: 12.02 10*3/MM3 (ref 3.4–10.8)

## 2024-11-26 PROCEDURE — 87651 STREP A DNA AMP PROBE: CPT | Performed by: EMERGENCY MEDICINE

## 2024-11-26 PROCEDURE — 99283 EMERGENCY DEPT VISIT LOW MDM: CPT

## 2024-11-26 PROCEDURE — 85025 COMPLETE CBC W/AUTO DIFF WBC: CPT | Performed by: EMERGENCY MEDICINE

## 2024-11-26 PROCEDURE — 25010000002 DEXAMETHASONE PER 1 MG: Performed by: EMERGENCY MEDICINE

## 2024-11-26 PROCEDURE — 80053 COMPREHEN METABOLIC PANEL: CPT | Performed by: EMERGENCY MEDICINE

## 2024-11-26 PROCEDURE — 87636 SARSCOV2 & INF A&B AMP PRB: CPT | Performed by: EMERGENCY MEDICINE

## 2024-11-26 PROCEDURE — 71046 X-RAY EXAM CHEST 2 VIEWS: CPT

## 2024-11-26 RX ORDER — POTASSIUM CHLORIDE 1500 MG/1
40 TABLET, EXTENDED RELEASE ORAL ONCE
Status: COMPLETED | OUTPATIENT
Start: 2024-11-26 | End: 2024-11-26

## 2024-11-26 RX ORDER — AZITHROMYCIN 250 MG/1
TABLET, FILM COATED ORAL
Qty: 6 TABLET | Refills: 0 | Status: SHIPPED | OUTPATIENT
Start: 2024-11-26 | End: 2024-11-26

## 2024-11-26 RX ORDER — AZITHROMYCIN 250 MG/1
TABLET, FILM COATED ORAL
Qty: 6 TABLET | Refills: 0 | Status: SHIPPED | OUTPATIENT
Start: 2024-11-26

## 2024-11-26 RX ADMIN — DEXAMETHASONE SODIUM PHOSPHATE 6 MG: 10 INJECTION, SOLUTION INTRAMUSCULAR; INTRAVENOUS at 21:52

## 2024-11-26 RX ADMIN — POTASSIUM CHLORIDE 40 MEQ: 1500 TABLET, EXTENDED RELEASE ORAL at 21:40

## 2024-11-27 NOTE — FSED PROVIDER NOTE
Subjective   History of Present Illness  84 yof complains of cough, sore throat and not feeling well. The patient states the cough started 1 week ago but became worse last night. 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 She denies nausea, vomiting, dysuria or fever.       Review of Systems   Constitutional:  Positive for fatigue.   HENT:  Positive for congestion and sore throat.    Respiratory:  Positive for cough.    All other systems reviewed and are negative.      Past Medical History:   Diagnosis Date    Arthritis     Disease of thyroid gland     parathyroid nodules    GERD (gastroesophageal reflux disease)     Hyperlipidemia     Hypertension     Lung nodules     Macular degeneration     of eyes    PONV (postoperative nausea and vomiting)     Skin cancer     Sleep apnea     uses c pap    Vertigo        Allergies   Allergen Reactions    Biaxin [Clarithromycin] GI Intolerance     Terrible stomach pain    Epinephrine Unknown - High Severity     syncope    Procaine Unknown - High Severity       Past Surgical History:   Procedure Laterality Date    BREAST SURGERY      Jonatan breast bx's  benign    CARDIAC CATHETERIZATION      CHOLECYSTECTOMY      DILATATION AND CURETTAGE      x2    EYE SURGERY      cataracts    SKIN CANCER EXCISION      Face    THYROIDECTOMY Bilateral 10/12/2021    Procedure: NECK EXPLORATION WITH LEFT PARATHYROID ADENOMA EXCISION AND FROZEN SECTION, INTRAOPERATIVE INTACT PTH ASSAY, RIGHT THYROIDECTOMY WITH FROZEN SECTION; LEFT PARTIAL THYROIDECTOMY; RECURRENT LARYNGEAL NERVE MONITORING;  Surgeon: Kenji Farley MD;  Location: [facility] MAIN OR;  Service: ENT;  Laterality: Bilateral;       Family History   Problem Relation Age of Onset    Stroke Mother     Heart disease Father        Social History     Socioeconomic History    Marital status:     Number of children: 1   Tobacco Use    Smoking status: Never    Smokeless tobacco: Never   Vaping Use    Vaping status:  Never Used   Substance and Sexual Activity    Alcohol use: Yes     Alcohol/week: 2.0 standard drinks of alcohol     Types: 2 Shots of liquor per week    Drug use: Never    Sexual activity: Not Currently           Objective   Physical Exam  Vitals reviewed.   Constitutional:       Appearance: Normal appearance.   HENT:      Head: Normocephalic and atraumatic.      Nose: Nose normal.      Mouth/Throat:      Mouth: Mucous membranes are moist.      Pharynx: Oropharynx is clear.   Eyes:      Extraocular Movements: Extraocular movements intact.      Pupils: Pupils are equal, round, and reactive to light.   Cardiovascular:      Rate and Rhythm: Normal rate.      Pulses: Normal pulses.      Heart sounds: Normal heart sounds.   Pulmonary:      Effort: Pulmonary effort is normal.      Breath sounds: Normal breath sounds.   Abdominal:      Palpations: Abdomen is soft.      Tenderness: There is no abdominal tenderness.   Skin:     General: Skin is warm and dry.   Neurological:      Mental Status: She is alert.         Procedures           ED Course  ED Course as of 11/27/24 0144   Tue Nov 26, 2024 2141 Discussed test results and treatment plan.  [BM]      ED Course User Index  [BM] Priyanka Sparrow MD                                           Medical Decision Making  This patient presents with symptoms suspicious for likely viral upper respiratory infection. Based on history and physical doubt sinusitis. COVID test was sent off and pending. Do not suspect underlying cardiopulmonary process. I considered, but think unlikely, dangerous causes of this patient's symptoms to include ACS, CHF or COPD exacerbations, pneumonia, pneumothorax. Plan to treat with Azithromycin. Return precautions discussed.     Problems Addressed:  Acute upper respiratory infection: complicated acute illness or injury    Amount and/or Complexity of Data Reviewed  Labs: ordered.  Radiology: ordered.    Risk  Prescription drug management.        Final  diagnoses:   Acute upper respiratory infection       ED Disposition  ED Disposition       ED Disposition   Discharge    Condition   Stable    Comment   --               Marcus Field MD  [address] [place] [address] [place] [address] [phone]    Schedule an appointment as soon as possible for a visit on 12/2/2024           Medication List        New Prescriptions      azithromycin 250 MG tablet  Commonly known as: ZITHROMAX  Take 2 tabs by mouth today, then take 1 tab daily for 4 more days               Where to Get Your Medications        These medications were sent to [id number] - [place], [place] - [address] [place] [address] - [phone]  - [phone] [address] [place] [address]      Hours: 24-hours Phone: [phone]   azithromycin 250 MG tablet

## 2024-12-03 ENCOUNTER — TRANSCRIBE ORDERS (OUTPATIENT)
Dept: ADMINISTRATIVE | Facility: HOSPITAL | Age: 84
End: 2024-12-03
Payer: MEDICARE

## 2024-12-03 DIAGNOSIS — Z12.31 BREAST CANCER SCREENING BY MAMMOGRAM: Primary | ICD-10-CM

## 2024-12-11 ENCOUNTER — APPOINTMENT (OUTPATIENT)
Dept: GENERAL RADIOLOGY | Facility: HOSPITAL | Age: 84
End: 2024-12-11
Payer: MEDICARE

## 2024-12-11 ENCOUNTER — HOSPITAL ENCOUNTER (OUTPATIENT)
Facility: HOSPITAL | Age: 84
Setting detail: OBSERVATION
Discharge: HOME | End: 2024-12-12
Attending: EMERGENCY MEDICINE | Admitting: EMERGENCY MEDICINE
Payer: MEDICARE

## 2024-12-11 ENCOUNTER — APPOINTMENT (OUTPATIENT)
Dept: CT IMAGING | Facility: HOSPITAL | Age: 84
End: 2024-12-11
Payer: MEDICARE

## 2024-12-11 DIAGNOSIS — R07.9 CHEST PAIN, UNSPECIFIED TYPE: Primary | ICD-10-CM

## 2024-12-11 DIAGNOSIS — R00.2 PALPITATIONS: ICD-10-CM

## 2024-12-11 LAB
ALBUMIN SERPL-MCNC: 4.3 G/DL (ref 3.5–5.2)
ALBUMIN/GLOB SERPL: 1.4 G/DL
ALP SERPL-CCNC: 93 U/L (ref 39–117)
ALT SERPL W P-5'-P-CCNC: 16 U/L (ref 1–33)
ANION GAP SERPL CALCULATED.3IONS-SCNC: 12.3 MMOL/L (ref 5–15)
AST SERPL-CCNC: 24 U/L (ref 1–32)
B PARAPERT DNA SPEC QL NAA+PROBE: NOT DETECTED
B PERT DNA SPEC QL NAA+PROBE: NOT DETECTED
BASOPHILS # BLD AUTO: 0.04 10*3/MM3 (ref 0–0.2)
BASOPHILS NFR BLD AUTO: 0.4 % (ref 0–1.5)
BILIRUB SERPL-MCNC: 0.2 MG/DL (ref 0–1.2)
BUN SERPL-MCNC: 25 MG/DL (ref 8–23)
BUN/CREAT SERPL: 24.8 (ref 7–25)
C PNEUM DNA NPH QL NAA+NON-PROBE: NOT DETECTED
CALCIUM SPEC-SCNC: 10.4 MG/DL (ref 8.6–10.5)
CHLORIDE SERPL-SCNC: 96 MMOL/L (ref 98–107)
CO2 SERPL-SCNC: 29.7 MMOL/L (ref 22–29)
CREAT SERPL-MCNC: 1.01 MG/DL (ref 0.57–1)
D DIMER PPP FEU-MCNC: 0.93 MCGFEU/ML (ref 0–0.84)
DEPRECATED RDW RBC AUTO: 40.4 FL (ref 37–54)
EGFRCR SERPLBLD CKD-EPI 2021: 55 ML/MIN/1.73
EOSINOPHIL # BLD AUTO: 0.11 10*3/MM3 (ref 0–0.4)
EOSINOPHIL NFR BLD AUTO: 1.1 % (ref 0.3–6.2)
ERYTHROCYTE [DISTWIDTH] IN BLOOD BY AUTOMATED COUNT: 13.1 % (ref 12.3–15.4)
FLUAV SUBTYP SPEC NAA+PROBE: NOT DETECTED
FLUBV RNA ISLT QL NAA+PROBE: NOT DETECTED
GEN 5 1HR TROPONIN T REFLEX: 19 NG/L
GLOBULIN UR ELPH-MCNC: 3 GM/DL
GLUCOSE SERPL-MCNC: 101 MG/DL (ref 65–99)
HADV DNA SPEC NAA+PROBE: NOT DETECTED
HCOV 229E RNA SPEC QL NAA+PROBE: NOT DETECTED
HCOV HKU1 RNA SPEC QL NAA+PROBE: NOT DETECTED
HCOV NL63 RNA SPEC QL NAA+PROBE: NOT DETECTED
HCOV OC43 RNA SPEC QL NAA+PROBE: NOT DETECTED
HCT VFR BLD AUTO: 39.4 % (ref 34–46.6)
HGB BLD-MCNC: 12.9 G/DL (ref 12–15.9)
HMPV RNA NPH QL NAA+NON-PROBE: NOT DETECTED
HOLD SPECIMEN: NORMAL
HOLD SPECIMEN: NORMAL
HPIV1 RNA ISLT QL NAA+PROBE: NOT DETECTED
HPIV2 RNA SPEC QL NAA+PROBE: NOT DETECTED
HPIV3 RNA NPH QL NAA+PROBE: NOT DETECTED
HPIV4 P GENE NPH QL NAA+PROBE: NOT DETECTED
IMM GRANULOCYTES # BLD AUTO: 0.05 10*3/MM3 (ref 0–0.05)
IMM GRANULOCYTES NFR BLD AUTO: 0.5 % (ref 0–0.5)
LYMPHOCYTES # BLD AUTO: 1.47 10*3/MM3 (ref 0.7–3.1)
LYMPHOCYTES NFR BLD AUTO: 14.9 % (ref 19.6–45.3)
M PNEUMO IGG SER IA-ACNC: NOT DETECTED
MCH RBC QN AUTO: 27.6 PG (ref 26.6–33)
MCHC RBC AUTO-ENTMCNC: 32.7 G/DL (ref 31.5–35.7)
MCV RBC AUTO: 84.2 FL (ref 79–97)
MONOCYTES # BLD AUTO: 0.81 10*3/MM3 (ref 0.1–0.9)
MONOCYTES NFR BLD AUTO: 8.2 % (ref 5–12)
NEUTROPHILS NFR BLD AUTO: 7.39 10*3/MM3 (ref 1.7–7)
NEUTROPHILS NFR BLD AUTO: 74.9 % (ref 42.7–76)
NRBC BLD AUTO-RTO: 0 /100 WBC (ref 0–0.2)
PLATELET # BLD AUTO: 350 10*3/MM3 (ref 140–450)
PMV BLD AUTO: 9.1 FL (ref 6–12)
POTASSIUM SERPL-SCNC: 3.7 MMOL/L (ref 3.5–5.2)
PROT SERPL-MCNC: 7.3 G/DL (ref 6–8.5)
RBC # BLD AUTO: 4.68 10*6/MM3 (ref 3.77–5.28)
RHINOVIRUS RNA SPEC NAA+PROBE: NOT DETECTED
RSV RNA NPH QL NAA+NON-PROBE: NOT DETECTED
SARS-COV-2 RNA RESP QL NAA+PROBE: NOT DETECTED
SODIUM SERPL-SCNC: 138 MMOL/L (ref 136–145)
TROPONIN T % DELTA: -5 %
TROPONIN T NUMERIC DELTA: -1 NG/L
TROPONIN T SERPL HS-MCNC: 20 NG/L
WBC NRBC COR # BLD AUTO: 9.87 10*3/MM3 (ref 3.4–10.8)
WHOLE BLOOD HOLD COAG: NORMAL
WHOLE BLOOD HOLD SPECIMEN: NORMAL

## 2024-12-11 PROCEDURE — G0378 HOSPITAL OBSERVATION PER HR: HCPCS

## 2024-12-11 PROCEDURE — 80053 COMPREHEN METABOLIC PANEL: CPT | Performed by: EMERGENCY MEDICINE

## 2024-12-11 PROCEDURE — 93005 ELECTROCARDIOGRAM TRACING: CPT

## 2024-12-11 PROCEDURE — 71275 CT ANGIOGRAPHY CHEST: CPT

## 2024-12-11 PROCEDURE — 93005 ELECTROCARDIOGRAM TRACING: CPT | Performed by: EMERGENCY MEDICINE

## 2024-12-11 PROCEDURE — 85379 FIBRIN DEGRADATION QUANT: CPT | Performed by: EMERGENCY MEDICINE

## 2024-12-11 PROCEDURE — 84484 ASSAY OF TROPONIN QUANT: CPT | Performed by: EMERGENCY MEDICINE

## 2024-12-11 PROCEDURE — 85025 COMPLETE CBC W/AUTO DIFF WBC: CPT | Performed by: EMERGENCY MEDICINE

## 2024-12-11 PROCEDURE — 25510000001 IOPAMIDOL PER 1 ML: Performed by: EMERGENCY MEDICINE

## 2024-12-11 PROCEDURE — 71045 X-RAY EXAM CHEST 1 VIEW: CPT

## 2024-12-11 PROCEDURE — 0202U NFCT DS 22 TRGT SARS-COV-2: CPT | Performed by: EMERGENCY MEDICINE

## 2024-12-11 PROCEDURE — 99285 EMERGENCY DEPT VISIT HI MDM: CPT

## 2024-12-11 RX ORDER — MORPHINE SULFATE 2 MG/ML
1 INJECTION, SOLUTION INTRAMUSCULAR; INTRAVENOUS EVERY 4 HOURS PRN
Status: DISCONTINUED | OUTPATIENT
Start: 2024-12-11 | End: 2024-12-12 | Stop reason: HOSPADM

## 2024-12-11 RX ORDER — BISACODYL 10 MG
10 SUPPOSITORY, RECTAL RECTAL DAILY PRN
Status: DISCONTINUED | OUTPATIENT
Start: 2024-12-11 | End: 2024-12-12 | Stop reason: HOSPADM

## 2024-12-11 RX ORDER — IOPAMIDOL 755 MG/ML
100 INJECTION, SOLUTION INTRAVASCULAR
Status: COMPLETED | OUTPATIENT
Start: 2024-12-11 | End: 2024-12-11

## 2024-12-11 RX ORDER — ONDANSETRON 2 MG/ML
4 INJECTION INTRAMUSCULAR; INTRAVENOUS EVERY 6 HOURS PRN
Status: DISCONTINUED | OUTPATIENT
Start: 2024-12-11 | End: 2024-12-12 | Stop reason: HOSPADM

## 2024-12-11 RX ORDER — NITROGLYCERIN 0.4 MG/1
0.4 TABLET SUBLINGUAL
Status: DISCONTINUED | OUTPATIENT
Start: 2024-12-11 | End: 2024-12-12 | Stop reason: HOSPADM

## 2024-12-11 RX ORDER — ASPIRIN 81 MG/1
324 TABLET, CHEWABLE ORAL ONCE
Status: COMPLETED | OUTPATIENT
Start: 2024-12-11 | End: 2024-12-11

## 2024-12-11 RX ORDER — NALOXONE HCL 0.4 MG/ML
0.4 VIAL (ML) INJECTION
Status: DISCONTINUED | OUTPATIENT
Start: 2024-12-11 | End: 2024-12-12 | Stop reason: HOSPADM

## 2024-12-11 RX ORDER — SODIUM CHLORIDE 0.9 % (FLUSH) 0.9 %
10 SYRINGE (ML) INJECTION AS NEEDED
Status: DISCONTINUED | OUTPATIENT
Start: 2024-12-11 | End: 2024-12-12 | Stop reason: HOSPADM

## 2024-12-11 RX ORDER — POLYETHYLENE GLYCOL 3350 17 G/17G
17 POWDER, FOR SOLUTION ORAL DAILY PRN
Status: DISCONTINUED | OUTPATIENT
Start: 2024-12-11 | End: 2024-12-12 | Stop reason: HOSPADM

## 2024-12-11 RX ORDER — DUPILUMAB 300 MG/2ML
INJECTION, SOLUTION SUBCUTANEOUS
COMMUNITY

## 2024-12-11 RX ORDER — BISACODYL 5 MG/1
5 TABLET, DELAYED RELEASE ORAL DAILY PRN
Status: DISCONTINUED | OUTPATIENT
Start: 2024-12-11 | End: 2024-12-12 | Stop reason: HOSPADM

## 2024-12-11 RX ORDER — FAMOTIDINE 20 MG/1
20 TABLET, FILM COATED ORAL 2 TIMES DAILY
COMMUNITY
End: 2024-12-12 | Stop reason: HOSPADM

## 2024-12-11 RX ORDER — FAMOTIDINE 20 MG/1
20 TABLET, FILM COATED ORAL ONCE
Status: COMPLETED | OUTPATIENT
Start: 2024-12-11 | End: 2024-12-11

## 2024-12-11 RX ORDER — VALSARTAN 80 MG/1
320 TABLET ORAL ONCE
Status: COMPLETED | OUTPATIENT
Start: 2024-12-11 | End: 2024-12-12

## 2024-12-11 RX ORDER — ANTIOX #8/OM3/DHA/EPA/LUT/ZEAX 250-2.5 MG
1 CAPSULE ORAL 2 TIMES DAILY
COMMUNITY

## 2024-12-11 RX ORDER — ROSUVASTATIN CALCIUM 5 MG/1
5 TABLET, COATED ORAL ONCE
Status: COMPLETED | OUTPATIENT
Start: 2024-12-11 | End: 2024-12-11

## 2024-12-11 RX ORDER — ALUMINA, MAGNESIA, AND SIMETHICONE 2400; 2400; 240 MG/30ML; MG/30ML; MG/30ML
15 SUSPENSION ORAL ONCE
Status: DISCONTINUED | OUTPATIENT
Start: 2024-12-11 | End: 2024-12-12 | Stop reason: HOSPADM

## 2024-12-11 RX ORDER — AMOXICILLIN 250 MG
2 CAPSULE ORAL 2 TIMES DAILY
Status: DISCONTINUED | OUTPATIENT
Start: 2024-12-11 | End: 2024-12-12 | Stop reason: HOSPADM

## 2024-12-11 RX ORDER — ACETAMINOPHEN 500 MG
1000 TABLET ORAL EVERY 6 HOURS PRN
COMMUNITY

## 2024-12-11 RX ADMIN — IOPAMIDOL 100 ML: 755 INJECTION, SOLUTION INTRAVENOUS at 20:12

## 2024-12-11 RX ADMIN — FAMOTIDINE 20 MG: 20 TABLET, FILM COATED ORAL at 22:16

## 2024-12-11 RX ADMIN — SERTRALINE 50 MG: 50 TABLET, FILM COATED ORAL at 22:16

## 2024-12-11 RX ADMIN — ASPIRIN 81 MG CHEWABLE TABLET 324 MG: 81 TABLET CHEWABLE at 18:33

## 2024-12-11 RX ADMIN — ROSUVASTATIN CALCIUM 5 MG: 5 TABLET, COATED ORAL at 22:16

## 2024-12-11 RX ADMIN — SENNOSIDES AND DOCUSATE SODIUM 2 TABLET: 50; 8.6 TABLET ORAL at 22:15

## 2024-12-11 NOTE — ED PROVIDER NOTES
Subjective   History of Present Illness  84-year-old female describes left-sided chest pain that she states felt like a heart spasm or indigestion since yesterday.  She reports no relieving or exacerbating factors.  She states she has had a cough over the last 1 week.  She reports no hemoptysis.  She states she has had issues with her potassium being low in the past.  She called her doctor today and they told her to come to the hospital for her symptoms.  Review of Systems    Past Medical History:   Diagnosis Date    Arthritis     Disease of thyroid gland     parathyroid nodules    GERD (gastroesophageal reflux disease)     Hyperlipidemia     Hypertension     Lung nodules     Macular degeneration     of eyes    PONV (postoperative nausea and vomiting)     Skin cancer     Sleep apnea     uses c pap    Vertigo        Allergies   Allergen Reactions    Biaxin [Clarithromycin] GI Intolerance     Terrible stomach pain    Epinephrine Unknown - High Severity     syncope    Procaine Unknown - High Severity       Past Surgical History:   Procedure Laterality Date    BREAST SURGERY      Jonatan breast bx's  benign    CARDIAC CATHETERIZATION      CHOLECYSTECTOMY      DILATATION AND CURETTAGE      x2    EYE SURGERY      cataracts    SKIN CANCER EXCISION      Face    THYROIDECTOMY Bilateral 10/12/2021    Procedure: NECK EXPLORATION WITH LEFT PARATHYROID ADENOMA EXCISION AND FROZEN SECTION, INTRAOPERATIVE INTACT PTH ASSAY, RIGHT THYROIDECTOMY WITH FROZEN SECTION; LEFT PARTIAL THYROIDECTOMY; RECURRENT LARYNGEAL NERVE MONITORING;  Surgeon: Kenji Farley MD;  Location: [facility] MAIN OR;  Service: ENT;  Laterality: Bilateral;       Family History   Problem Relation Age of Onset    Stroke Mother     Heart disease Father        Social History     Socioeconomic History    Marital status:     Number of children: 1   Tobacco Use    Smoking status: Never    Smokeless tobacco: Never   Vaping Use    Vaping status: Never Used   Substance  and Sexual Activity    Alcohol use: Yes     Alcohol/week: 2.0 standard drinks of alcohol     Types: 2 Shots of liquor per week    Drug use: Never    Sexual activity: Not Currently       Prior to Admission medications    Medication Sig Start Date End Date Taking? Authorizing Provider   Aflibercept (Eylea) 2 MG/0.05ML solution by Intravitreal route Every 2 (Two) Months.    Emergency, Nurse Epic, RN   aspirin 81 MG chewable tablet Chew 1 tablet Daily. Last dose 10/04/21 per pt 10/20/21   Kenji Farley MD   azithromycin (ZITHROMAX) 250 MG tablet Take 2 tabs by mouth today, then take 1 tab daily for 4 more days 11/26/24   Priyanka Sparrow MD   Coenzyme Q10 (COQ-10 PO) Take 1 capsule by mouth Daily.    Emergency, Nurse MONY Nunez   erythromycin (ROMYCIN) 5 MG/GM ophthalmic ointment apply (1CM)  by ophthalmic route before and after injections. 5/25/23   Luis Lucas MD   estradiol (VAGIFEM) 10 MCG tablet vaginal tablet INSERT 1 TABLET VAGINALLY AT BEDTIME FOR 30 DAYS AS DIRECTED. 3/17/23   Luis Lucas MD   Glucosamine-Chondroitin 250-200 MG tablet     Luis Lucas MD   Hydrocortisone 2 % cream Apply 1 Application topically Daily. 7/3/24   Kim Culver APRN   Lancets (Safety Lancet 28G/Pressure Act) misc 1 each As Needed (test BG as needed once daily). 11/16/22   Malu Shelley APRN   magnesium oxide (MAG-OX) 400 MG tablet magnesium oxide 400 mg magnesium oral tablet take 1 tablet by oral route daily   Active    Luis Lucas MD   meclizine (ANTIVERT) 25 MG tablet Take 1 tablet by mouth 3 (Three) Times a Day As Needed for Dizziness.    Emergency, Nurse MONY Nunez   multivitamin (THERAGRAN) tablet tablet Take 1 tablet by mouth.    Luis Lucas MD   nitroglycerin (NITROSTAT) 0.4 MG SL tablet Place 1 tablet under the tongue.    Luis Lucas MD   omeprazole (priLOSEC) 20 MG capsule Take 1 capsule by mouth. 2/17/23   Luis Lucas MD   Probiotic Product  "(PROBIOTIC ADVANCED PO) Take 1 capsule by mouth Daily.    Emergency, Nurse MONY Nunez   rosuvastatin (CRESTOR) 5 MG tablet Take 1 tablet by mouth. 1/25/22 10/10/23  Emergency, Nurse MONY Nunez   sertraline (ZOLOFT) 50 MG tablet Take 1 tablet by mouth Every Night. 2/20/23   Luis Lucas MD   Synthroid 100 MCG tablet Take 1 tablet by mouth Daily. 7/6/22   Emergency, Nurse Epic, RN   tretinoin (RETIN-A) 0.025 % cream APPLY PEA SIZED AMOUNT TOPICALLY TO FACE EVERY NIGHT AT BEDTIME 6/20/23   ProviderLuis MD   valsartan-hydrochlorothiazide (DIOVAN-HCT) 320-25 MG per tablet Take 1 tablet by mouth Daily.    ProviderLuis MD   vitamin B-12 (CYANOCOBALAMIN) 1000 MCG tablet Take 1 tablet by mouth. 6/27/23   Luis Lucas MD     /70   Pulse 70   Temp 98.2 °F (36.8 °C) (Oral)   Resp 19   Ht 157.5 cm (62\")   Wt 78.4 kg (172 lb 13.5 oz)   SpO2 97%   BMI 31.61 kg/m²       Objective   Physical Exam  General: Well-developed well-appearing, no acute distress, alert and appropriate  Eyes: sclera nonicteric  HEENT: Mucous membranes moist, no mucosal swelling  Neck: Supple, no nuchal rigidity,   Respirations: Respirations nonlabored, equal breath sounds bilaterally, clear lungs, chest wall nontender  Heart regular rate and rhythm, no murmurs rubs or gallops, equal pulses bilaterally  Abdomen soft nontender nondistended, no hepatosplenomegaly,   Extremities no clubbing cyanosis or edema, calves are symmetric and nontender  Neuro cranial nerves grossly intact, no focal limb deficits  Psych oriented, pleasant affect  Skin no rash, brisk cap refill  Procedures           ED Course      Results for orders placed or performed during the hospital encounter of 12/11/24   ECG 12 Lead Chest Pain    Collection Time: 12/11/24  5:20 PM   Result Value Ref Range    QT Interval 368 ms    QTC Interval 429 ms   Respiratory Panel PCR w/COVID-19(SARS-CoV-2) KOURTNEY/ALIREZA/SHAWN/PAD/COR/MATTHEW In-House, NP Swab in UTM/VTM, 2 HR " TAT - Swab, Nasopharynx    Collection Time: 12/11/24  6:19 PM    Specimen: Nasopharynx; Swab   Result Value Ref Range    ADENOVIRUS, PCR Not Detected Not Detected    Coronavirus 229E Not Detected Not Detected    Coronavirus HKU1 Not Detected Not Detected    Coronavirus NL63 Not Detected Not Detected    Coronavirus OC43 Not Detected Not Detected    COVID19 Not Detected Not Detected - Ref. Range    Human Metapneumovirus Not Detected Not Detected    Human Rhinovirus/Enterovirus Not Detected Not Detected    Influenza A PCR Not Detected Not Detected    Influenza B PCR Not Detected Not Detected    Parainfluenza Virus 1 Not Detected Not Detected    Parainfluenza Virus 2 Not Detected Not Detected    Parainfluenza Virus 3 Not Detected Not Detected    Parainfluenza Virus 4 Not Detected Not Detected    RSV, PCR Not Detected Not Detected    Bordetella pertussis pcr Not Detected Not Detected    Bordetella parapertussis PCR Not Detected Not Detected    Chlamydophila pneumoniae PCR Not Detected Not Detected    Mycoplasma pneumo by PCR Not Detected Not Detected   Comprehensive Metabolic Panel    Collection Time: 12/11/24  6:19 PM    Specimen: Blood   Result Value Ref Range    Glucose 101 (H) 65 - 99 mg/dL    BUN 25 (H) 8 - 23 mg/dL    Creatinine 1.01 (H) 0.57 - 1.00 mg/dL    Sodium 138 136 - 145 mmol/L    Potassium 3.7 3.5 - 5.2 mmol/L    Chloride 96 (L) 98 - 107 mmol/L    CO2 29.7 (H) 22.0 - 29.0 mmol/L    Calcium 10.4 8.6 - 10.5 mg/dL    Total Protein 7.3 6.0 - 8.5 g/dL    Albumin 4.3 3.5 - 5.2 g/dL    ALT (SGPT) 16 1 - 33 U/L    AST (SGOT) 24 1 - 32 U/L    Alkaline Phosphatase 93 39 - 117 U/L    Total Bilirubin 0.2 0.0 - 1.2 mg/dL    Globulin 3.0 gm/dL    A/G Ratio 1.4 g/dL    BUN/Creatinine Ratio 24.8 7.0 - 25.0    Anion Gap 12.3 5.0 - 15.0 mmol/L    eGFR 55.0 (L) >60.0 mL/min/1.73   High Sensitivity Troponin T    Collection Time: 12/11/24  6:19 PM    Specimen: Blood   Result Value Ref Range    HS Troponin T 20 (H) <14 ng/L    D-dimer, Quantitative    Collection Time: 12/11/24  6:19 PM    Specimen: Blood   Result Value Ref Range    D-Dimer, Quantitative 0.93 (H) 0.00 - 0.84 MCGFEU/mL   CBC Auto Differential    Collection Time: 12/11/24  6:19 PM    Specimen: Blood   Result Value Ref Range    WBC 9.87 3.40 - 10.80 10*3/mm3    RBC 4.68 3.77 - 5.28 10*6/mm3    Hemoglobin 12.9 12.0 - 15.9 g/dL    Hematocrit 39.4 34.0 - 46.6 %    MCV 84.2 79.0 - 97.0 fL    MCH 27.6 26.6 - 33.0 pg    MCHC 32.7 31.5 - 35.7 g/dL    RDW 13.1 12.3 - 15.4 %    RDW-SD 40.4 37.0 - 54.0 fl    MPV 9.1 6.0 - 12.0 fL    Platelets 350 140 - 450 10*3/mm3    Neutrophil % 74.9 42.7 - 76.0 %    Lymphocyte % 14.9 (L) 19.6 - 45.3 %    Monocyte % 8.2 5.0 - 12.0 %    Eosinophil % 1.1 0.3 - 6.2 %    Basophil % 0.4 0.0 - 1.5 %    Immature Grans % 0.5 0.0 - 0.5 %    Neutrophils, Absolute 7.39 (H) 1.70 - 7.00 10*3/mm3    Lymphocytes, Absolute 1.47 0.70 - 3.10 10*3/mm3    Monocytes, Absolute 0.81 0.10 - 0.90 10*3/mm3    Eosinophils, Absolute 0.11 0.00 - 0.40 10*3/mm3    Basophils, Absolute 0.04 0.00 - 0.20 10*3/mm3    Immature Grans, Absolute 0.05 0.00 - 0.05 10*3/mm3    nRBC 0.0 0.0 - 0.2 /100 WBC   Green Top (Gel)    Collection Time: 12/11/24  6:19 PM   Result Value Ref Range    Extra Tube Hold for add-ons.    Lavender Top    Collection Time: 12/11/24  6:19 PM   Result Value Ref Range    Extra Tube hold for add-on    Gold Top - SST    Collection Time: 12/11/24  6:19 PM   Result Value Ref Range    Extra Tube Hold for add-ons.    Light Blue Top    Collection Time: 12/11/24  6:19 PM   Result Value Ref Range    Extra Tube Hold for add-ons.    High Sensitivity Troponin T 1Hr    Collection Time: 12/11/24  7:15 PM    Specimen: Blood   Result Value Ref Range    HS Troponin T 19 (H) <14 ng/L    Troponin T Delta -1 ng/L    Troponin T % Change -5 Abnormal if >/= 20% %     CT Angiogram Chest Pulmonary Embolism    Result Date: 12/11/2024  No acute cardiopulmonary disease. No pulmonary embolus.  Electronically Signed: Palomo Wilkinson MD  12/11/2024 8:19 PM EST  Workstation ID: [id number]    XR Chest 1 View    Result Date: 12/11/2024  Impression: No acute process identified Electronically Signed: Gio Cordero MD  12/11/2024 7:31 PM EST  Workstation ID: [id number]               HEART Score: 5   Shared Decision Making  I discussed the findings with the patient/patient representative who is in agreement with the treatment plan and the final disposition.  Risks and benefits of discharge and/or observation/admission were discussed: Yes                                      Medical Decision Making  Differential diagnosis including acute coronary syndrome, pulmonary embolus, dissection, pneumonia, pneumothorax    Patient has a moderate risk heart score.  CT negative for pulmonary embolus.  She was ordered aspirin.  She was resting comfortably reexamination extent stating that she felt some slight indigestion.  She was ordered GI cocktail for this and is agreeable to the plan of hospital observation for the chest pain valuation including cardiology consultation.    Problems Addressed:  Chest pain, unspecified type: complicated acute illness or injury    Amount and/or Complexity of Data Reviewed  Labs: ordered. Decision-making details documented in ED Course.     Details: High-sensitivity troponin delta normal, D-dimer marginally elevated, CBC normal, comprehensive metabolic panel shows a mild renal insufficiency, respiratory panel negative  Radiology: ordered and independent interpretation performed.     Details: My interpretation of chest x-ray image no apparent acute process  ECG/medicine tests: ordered and independent interpretation performed.     Details: My EKG interpretation sinus rhythm rate of 81, right axis deviation    Risk  OTC drugs.  Prescription drug management.  Decision regarding hospitalization.        Final diagnoses:   Chest pain, unspecified type       ED Disposition  ED Disposition       ED Disposition    Decision to Admit    Condition   --    Comment   --               No follow-up provider specified.       Medication List      No changes were made to your prescriptions during this visit.            Jordy Ordaz MD  12/11/24 2048

## 2024-12-12 ENCOUNTER — APPOINTMENT (OUTPATIENT)
Dept: CARDIOLOGY | Facility: HOSPITAL | Age: 84
End: 2024-12-12
Payer: MEDICARE

## 2024-12-12 ENCOUNTER — APPOINTMENT (OUTPATIENT)
Dept: NUCLEAR MEDICINE | Facility: HOSPITAL | Age: 84
End: 2024-12-12
Payer: MEDICARE

## 2024-12-12 ENCOUNTER — ON CAMPUS - OUTPATIENT (OUTPATIENT)
Dept: URBAN - METROPOLITAN AREA HOSPITAL 85 | Facility: HOSPITAL | Age: 84
End: 2024-12-12
Payer: MEDICARE

## 2024-12-12 VITALS
WEIGHT: 164 LBS | OXYGEN SATURATION: 93 % | RESPIRATION RATE: 16 BRPM | BODY MASS INDEX: 30.18 KG/M2 | HEART RATE: 88 BPM | DIASTOLIC BLOOD PRESSURE: 67 MMHG | SYSTOLIC BLOOD PRESSURE: 121 MMHG | HEIGHT: 62 IN | TEMPERATURE: 98.2 F

## 2024-12-12 DIAGNOSIS — R07.89 OTHER CHEST PAIN: ICD-10-CM

## 2024-12-12 DIAGNOSIS — K44.9 DIAPHRAGMATIC HERNIA WITHOUT OBSTRUCTION OR GANGRENE: ICD-10-CM

## 2024-12-12 DIAGNOSIS — Z90.49 ACQUIRED ABSENCE OF OTHER SPECIFIED PARTS OF DIGESTIVE TRACT: ICD-10-CM

## 2024-12-12 DIAGNOSIS — K21.9 GASTRO-ESOPHAGEAL REFLUX DISEASE WITHOUT ESOPHAGITIS: ICD-10-CM

## 2024-12-12 LAB
ANION GAP SERPL CALCULATED.3IONS-SCNC: 11.6 MMOL/L (ref 5–15)
AORTIC DIMENSIONLESS INDEX: 1 (DI)
BASOPHILS # BLD AUTO: 0.05 10*3/MM3 (ref 0–0.2)
BASOPHILS NFR BLD AUTO: 0.5 % (ref 0–1.5)
BH CV ECHO LEFT VENTRICLE GLOBAL LONGITUDINAL STRAIN: -17.9 %
BH CV ECHO MEAS - ACS: 1.8 CM
BH CV ECHO MEAS - AI P1/2T: 495.7 MSEC
BH CV ECHO MEAS - AO MAX PG: 12.3 MMHG
BH CV ECHO MEAS - AO MEAN PG: 7 MMHG
BH CV ECHO MEAS - AO V2 MAX: 175 CM/SEC
BH CV ECHO MEAS - AO V2 VTI: 29.8 CM
BH CV ECHO MEAS - AVA(I,D): 2.49 CM2
BH CV ECHO MEAS - EDV(CUBED): 68.9 ML
BH CV ECHO MEAS - EDV(MOD-SP4): 60.6 ML
BH CV ECHO MEAS - EF(MOD-BP): 58 %
BH CV ECHO MEAS - EF(MOD-SP4): 58.1 %
BH CV ECHO MEAS - ESV(CUBED): 17.6 ML
BH CV ECHO MEAS - ESV(MOD-SP4): 25.4 ML
BH CV ECHO MEAS - FS: 36.6 %
BH CV ECHO MEAS - IVS/LVPW: 1.33 CM
BH CV ECHO MEAS - IVSD: 1.2 CM
BH CV ECHO MEAS - LA DIMENSION: 2.8 CM
BH CV ECHO MEAS - LAT PEAK E' VEL: 8.1 CM/SEC
BH CV ECHO MEAS - LV DIASTOLIC VOL/BSA (35-75): 34.5 CM2
BH CV ECHO MEAS - LV MASS(C)D: 141.5 GRAMS
BH CV ECHO MEAS - LV MAX PG: 12.3 MMHG
BH CV ECHO MEAS - LV MEAN PG: 7 MMHG
BH CV ECHO MEAS - LV SYSTOLIC VOL/BSA (12-30): 14.5 CM2
BH CV ECHO MEAS - LV V1 MAX: 175 CM/SEC
BH CV ECHO MEAS - LV V1 VTI: 29.2 CM
BH CV ECHO MEAS - LVIDD: 4.1 CM
BH CV ECHO MEAS - LVIDS: 2.6 CM
BH CV ECHO MEAS - LVOT AREA: 2.5 CM2
BH CV ECHO MEAS - LVOT DIAM: 1.8 CM
BH CV ECHO MEAS - LVPWD: 0.9 CM
BH CV ECHO MEAS - MED PEAK E' VEL: 6.3 CM/SEC
BH CV ECHO MEAS - MV A MAX VEL: 94.3 CM/SEC
BH CV ECHO MEAS - MV DEC SLOPE: 917 CM/SEC2
BH CV ECHO MEAS - MV DEC TIME: 0.2 SEC
BH CV ECHO MEAS - MV E MAX VEL: 68.7 CM/SEC
BH CV ECHO MEAS - MV E/A: 0.73
BH CV ECHO MEAS - MV MAX PG: 6.9 MMHG
BH CV ECHO MEAS - MV MEAN PG: 3 MMHG
BH CV ECHO MEAS - MV P1/2T: 43.1 MSEC
BH CV ECHO MEAS - MV V2 VTI: 26.3 CM
BH CV ECHO MEAS - MVA(P1/2T): 5.1 CM2
BH CV ECHO MEAS - MVA(VTI): 2.8 CM2
BH CV ECHO MEAS - PA ACC TIME: 0.08 SEC
BH CV ECHO MEAS - PA V2 MAX: 132 CM/SEC
BH CV ECHO MEAS - RV MAX PG: 4.8 MMHG
BH CV ECHO MEAS - RV V1 MAX: 109 CM/SEC
BH CV ECHO MEAS - RV V1 VTI: 20.9 CM
BH CV ECHO MEAS - RVDD: 2 CM
BH CV ECHO MEAS - SV(LVOT): 74.3 ML
BH CV ECHO MEAS - SV(MOD-SP4): 35.2 ML
BH CV ECHO MEAS - SVI(LVOT): 42.3 ML/M2
BH CV ECHO MEAS - SVI(MOD-SP4): 20 ML/M2
BH CV ECHO MEAS - TAPSE (>1.6): 1.91 CM
BH CV ECHO MEASUREMENTS AVERAGE E/E' RATIO: 9.54
BH CV REST NUCLEAR ISOTOPE DOSE: 11 MCI
BH CV STRESS BP STAGE 1: NORMAL
BH CV STRESS BP STAGE 2: NORMAL
BH CV STRESS COMMENTS STAGE 1: NORMAL
BH CV STRESS COMMENTS STAGE 2: NORMAL
BH CV STRESS DOSE REGADENOSON STAGE 1: 0.4
BH CV STRESS DURATION MIN STAGE 1: 0
BH CV STRESS DURATION MIN STAGE 2: 4
BH CV STRESS DURATION SEC STAGE 1: 10
BH CV STRESS DURATION SEC STAGE 2: 0
BH CV STRESS HR STAGE 1: 74
BH CV STRESS HR STAGE 2: 112
BH CV STRESS NUCLEAR ISOTOPE DOSE: 33 MCI
BH CV STRESS PROTOCOL 1: NORMAL
BH CV STRESS RECOVERY BP: NORMAL MMHG
BH CV STRESS RECOVERY HR: 92 BPM
BH CV STRESS STAGE 1: 1
BH CV STRESS STAGE 2: 2
BH CV XLRA - TDI S': 16.8 CM/SEC
BUN SERPL-MCNC: 26 MG/DL (ref 8–23)
BUN/CREAT SERPL: 26.8 (ref 7–25)
CALCIUM SPEC-SCNC: 9.9 MG/DL (ref 8.6–10.5)
CHLORIDE SERPL-SCNC: 98 MMOL/L (ref 98–107)
CHOLEST SERPL-MCNC: 148 MG/DL (ref 0–200)
CO2 SERPL-SCNC: 27.4 MMOL/L (ref 22–29)
CREAT SERPL-MCNC: 0.97 MG/DL (ref 0.57–1)
DEPRECATED RDW RBC AUTO: 41.2 FL (ref 37–54)
EGFRCR SERPLBLD CKD-EPI 2021: 57.7 ML/MIN/1.73
EOSINOPHIL # BLD AUTO: 0.21 10*3/MM3 (ref 0–0.4)
EOSINOPHIL NFR BLD AUTO: 2.1 % (ref 0.3–6.2)
ERYTHROCYTE [DISTWIDTH] IN BLOOD BY AUTOMATED COUNT: 13.3 % (ref 12.3–15.4)
GLUCOSE SERPL-MCNC: 96 MG/DL (ref 65–99)
HCT VFR BLD AUTO: 38.3 % (ref 34–46.6)
HDLC SERPL-MCNC: 51 MG/DL (ref 40–60)
HGB BLD-MCNC: 12.3 G/DL (ref 12–15.9)
IMM GRANULOCYTES # BLD AUTO: 0.03 10*3/MM3 (ref 0–0.05)
IMM GRANULOCYTES NFR BLD AUTO: 0.3 % (ref 0–0.5)
LDLC SERPL CALC-MCNC: 71 MG/DL (ref 0–100)
LDLC/HDLC SERPL: 1.31 {RATIO}
LV EF NUC BP: 79 %
LYMPHOCYTES # BLD AUTO: 1.79 10*3/MM3 (ref 0.7–3.1)
LYMPHOCYTES NFR BLD AUTO: 17.6 % (ref 19.6–45.3)
MAXIMAL PREDICTED HEART RATE: 136 BPM
MCH RBC QN AUTO: 27.2 PG (ref 26.6–33)
MCHC RBC AUTO-ENTMCNC: 32.1 G/DL (ref 31.5–35.7)
MCV RBC AUTO: 84.7 FL (ref 79–97)
MONOCYTES # BLD AUTO: 1.11 10*3/MM3 (ref 0.1–0.9)
MONOCYTES NFR BLD AUTO: 10.9 % (ref 5–12)
NEUTROPHILS NFR BLD AUTO: 6.99 10*3/MM3 (ref 1.7–7)
NEUTROPHILS NFR BLD AUTO: 68.6 % (ref 42.7–76)
NRBC BLD AUTO-RTO: 0 /100 WBC (ref 0–0.2)
PERCENT MAX PREDICTED HR: 86.03 %
PLATELET # BLD AUTO: 352 10*3/MM3 (ref 140–450)
PMV BLD AUTO: 9.2 FL (ref 6–12)
POTASSIUM SERPL-SCNC: 4 MMOL/L (ref 3.5–5.2)
QT INTERVAL: 368 MS
QTC INTERVAL: 429 MS
RBC # BLD AUTO: 4.52 10*6/MM3 (ref 3.77–5.28)
SINUS: 2.8 CM
SODIUM SERPL-SCNC: 137 MMOL/L (ref 136–145)
STJ: 2.2 CM
STRESS BASELINE BP: NORMAL MMHG
STRESS BASELINE HR: 74 BPM
STRESS PERCENT HR: 101 %
STRESS POST PEAK BP: NORMAL MMHG
STRESS POST PEAK HR: 117 BPM
STRESS TARGET HR: 116 BPM
TRIGL SERPL-MCNC: 152 MG/DL (ref 0–150)
TROPONIN T SERPL HS-MCNC: 24 NG/L
VLDLC SERPL-MCNC: 26 MG/DL (ref 5–40)
WBC NRBC COR # BLD AUTO: 10.18 10*3/MM3 (ref 3.4–10.8)

## 2024-12-12 PROCEDURE — 93017 CV STRESS TEST TRACING ONLY: CPT

## 2024-12-12 PROCEDURE — 99204 OFFICE O/P NEW MOD 45 MIN: CPT | Performed by: INTERNAL MEDICINE

## 2024-12-12 PROCEDURE — G0378 HOSPITAL OBSERVATION PER HR: HCPCS

## 2024-12-12 PROCEDURE — 93356 MYOCRD STRAIN IMG SPCKL TRCK: CPT | Performed by: INTERNAL MEDICINE

## 2024-12-12 PROCEDURE — 78452 HT MUSCLE IMAGE SPECT MULT: CPT

## 2024-12-12 PROCEDURE — 84484 ASSAY OF TROPONIN QUANT: CPT | Performed by: EMERGENCY MEDICINE

## 2024-12-12 PROCEDURE — 80061 LIPID PANEL: CPT | Performed by: NURSE PRACTITIONER

## 2024-12-12 PROCEDURE — 93306 TTE W/DOPPLER COMPLETE: CPT

## 2024-12-12 PROCEDURE — 85025 COMPLETE CBC W/AUTO DIFF WBC: CPT | Performed by: EMERGENCY MEDICINE

## 2024-12-12 PROCEDURE — 34310000005 TECHNETIUM SESTAMIBI: Performed by: EMERGENCY MEDICINE

## 2024-12-12 PROCEDURE — 80048 BASIC METABOLIC PNL TOTAL CA: CPT | Performed by: EMERGENCY MEDICINE

## 2024-12-12 PROCEDURE — 93306 TTE W/DOPPLER COMPLETE: CPT | Performed by: INTERNAL MEDICINE

## 2024-12-12 PROCEDURE — 25010000002 REGADENOSON 0.4 MG/5ML SOLUTION: Performed by: EMERGENCY MEDICINE

## 2024-12-12 PROCEDURE — A9502 TC99M TETROFOSMIN: HCPCS | Performed by: EMERGENCY MEDICINE

## 2024-12-12 PROCEDURE — 93018 CV STRESS TEST I&R ONLY: CPT | Performed by: INTERNAL MEDICINE

## 2024-12-12 PROCEDURE — A9500 TC99M SESTAMIBI: HCPCS | Performed by: EMERGENCY MEDICINE

## 2024-12-12 PROCEDURE — 93356 MYOCRD STRAIN IMG SPCKL TRCK: CPT

## 2024-12-12 PROCEDURE — 78452 HT MUSCLE IMAGE SPECT MULT: CPT | Performed by: INTERNAL MEDICINE

## 2024-12-12 PROCEDURE — 34310000005 TECHNETIUM TETROFOSMIN KIT: Performed by: EMERGENCY MEDICINE

## 2024-12-12 PROCEDURE — 99222 1ST HOSP IP/OBS MODERATE 55: CPT | Performed by: NURSE PRACTITIONER

## 2024-12-12 RX ORDER — HYDROCHLOROTHIAZIDE 25 MG/1
25 TABLET ORAL ONCE
Status: COMPLETED | OUTPATIENT
Start: 2024-12-12 | End: 2024-12-12

## 2024-12-12 RX ORDER — LEVOTHYROXINE SODIUM 88 UG/1
88 TABLET ORAL
Status: DISCONTINUED | OUTPATIENT
Start: 2024-12-12 | End: 2024-12-12 | Stop reason: HOSPADM

## 2024-12-12 RX ORDER — VALSARTAN 80 MG/1
320 TABLET ORAL NIGHTLY
Status: DISCONTINUED | OUTPATIENT
Start: 2024-12-12 | End: 2024-12-12 | Stop reason: HOSPADM

## 2024-12-12 RX ORDER — ROSUVASTATIN CALCIUM 5 MG/1
5 TABLET, COATED ORAL NIGHTLY
Status: DISCONTINUED | OUTPATIENT
Start: 2024-12-12 | End: 2024-12-12 | Stop reason: HOSPADM

## 2024-12-12 RX ORDER — ACETAMINOPHEN 325 MG/1
650 TABLET ORAL EVERY 6 HOURS PRN
Status: DISCONTINUED | OUTPATIENT
Start: 2024-12-12 | End: 2024-12-12 | Stop reason: HOSPADM

## 2024-12-12 RX ORDER — ISOSORBIDE MONONITRATE 30 MG/1
30 TABLET, EXTENDED RELEASE ORAL
Status: DISCONTINUED | OUTPATIENT
Start: 2024-12-12 | End: 2024-12-12 | Stop reason: HOSPADM

## 2024-12-12 RX ORDER — REGADENOSON 0.08 MG/ML
0.4 INJECTION, SOLUTION INTRAVENOUS
Status: COMPLETED | OUTPATIENT
Start: 2024-12-12 | End: 2024-12-12

## 2024-12-12 RX ORDER — METOPROLOL SUCCINATE 25 MG/1
25 TABLET, EXTENDED RELEASE ORAL
Status: DISCONTINUED | OUTPATIENT
Start: 2024-12-12 | End: 2024-12-12

## 2024-12-12 RX ORDER — HYDROCHLOROTHIAZIDE 25 MG/1
25 TABLET ORAL NIGHTLY
Status: DISCONTINUED | OUTPATIENT
Start: 2024-12-12 | End: 2024-12-12 | Stop reason: HOSPADM

## 2024-12-12 RX ORDER — FAMOTIDINE 20 MG/1
20 TABLET, FILM COATED ORAL 2 TIMES DAILY
Status: DISCONTINUED | OUTPATIENT
Start: 2024-12-12 | End: 2024-12-12 | Stop reason: HOSPADM

## 2024-12-12 RX ORDER — AMLODIPINE BESYLATE 5 MG/1
10 TABLET ORAL
Status: DISCONTINUED | OUTPATIENT
Start: 2024-12-12 | End: 2024-12-12 | Stop reason: HOSPADM

## 2024-12-12 RX ORDER — ASPIRIN 81 MG/1
81 TABLET, CHEWABLE ORAL DAILY
Status: DISCONTINUED | OUTPATIENT
Start: 2024-12-12 | End: 2024-12-12 | Stop reason: HOSPADM

## 2024-12-12 RX ORDER — AMLODIPINE BESYLATE 5 MG/1
5 TABLET ORAL
Status: DISCONTINUED | OUTPATIENT
Start: 2024-12-12 | End: 2024-12-12

## 2024-12-12 RX ADMIN — ISOSORBIDE MONONITRATE 30 MG: 30 TABLET, EXTENDED RELEASE ORAL at 09:30

## 2024-12-12 RX ADMIN — VALSARTAN 320 MG: 80 TABLET ORAL at 00:17

## 2024-12-12 RX ADMIN — LEVOTHYROXINE SODIUM 88 MCG: 0.09 TABLET ORAL at 08:07

## 2024-12-12 RX ADMIN — TECHNETIUM TC 99M SESTAMIBI 1 DOSE: 1 INJECTION INTRAVENOUS at 13:53

## 2024-12-12 RX ADMIN — REGADENOSON 0.4 MG: 0.08 INJECTION, SOLUTION INTRAVENOUS at 13:53

## 2024-12-12 RX ADMIN — AMLODIPINE BESYLATE 10 MG: 5 TABLET ORAL at 09:30

## 2024-12-12 RX ADMIN — ACETAMINOPHEN 650 MG: 325 TABLET, FILM COATED ORAL at 15:17

## 2024-12-12 RX ADMIN — HYDROCHLOROTHIAZIDE 25 MG: 25 TABLET ORAL at 00:41

## 2024-12-12 RX ADMIN — TETROFOSMIN 1 DOSE: 1.38 INJECTION, POWDER, LYOPHILIZED, FOR SOLUTION INTRAVENOUS at 11:00

## 2024-12-12 NOTE — NURSING NOTE
"Tech came to let rn know that pts bp was high. RN went took manual and reassured pt that her bp was what it stays around and not what machine read. Pt questioned nurse on why they said two different things. Nurse explained to pt that sometimes bp cuffs do not inflate/deflate properly. Pt did not seem happy with that answer. Pt also questioned nurse about her synthroid medication and it was explained to her that the provider would be by to see her and reorder all home meds. Pt still seemed agitated so nurse inturrupted provider rounding to see the pt, Pt made the remark and stated \"So he is not a MD?\". Nurse administered thyroid medication. Dr. Saravia then in to see patient and ordered new medication. PT questioned nurse on medication and wanted to know why it was being administered. Nurses explained to pt that one was for bp, one was for CP, one was for acid, and one was asa 81mg. Pt refused asa and pepcid. Pt asked who started her on theses meds, when asked pt stated Dr. Saravia told her he was starting her on medication that had the same effect as NITRO. Pt seems to want answers but does not like the answers she is receiving. All f/u test are being done, GI has been consulted, and pt has been rounded on every hour to ensure quality care.   "

## 2024-12-12 NOTE — PLAN OF CARE
Problem: Adult Inpatient Plan of Care  Goal: Absence of Hospital-Acquired Illness or Injury  Intervention: Identify and Manage Fall Risk  Recent Flowsheet Documentation  Taken 12/12/2024 0801 by Jake Kunz RN  Safety Promotion/Fall Prevention:   nonskid shoes/slippers when out of bed   toileting scheduled   safety round/check completed   clutter free environment maintained   assistive device/personal items within reach  Intervention: Prevent Infection  Recent Flowsheet Documentation  Taken 12/12/2024 0801 by Jake Kunz RN  Infection Prevention: environmental surveillance performed  Goal: Optimal Comfort and Wellbeing  Intervention: Provide Person-Centered Care  Recent Flowsheet Documentation  Taken 12/12/2024 0800 by Jake Kunz RN  Trust Relationship/Rapport:   care explained   questions answered     Problem: Comorbidity Management  Goal: Blood Pressure in Desired Range  Intervention: Maintain Blood Pressure Management  Recent Flowsheet Documentation  Taken 12/12/2024 0801 by Jake Kunz RN  Medication Review/Management: medications reviewed   Goal Outcome Evaluation:

## 2024-12-12 NOTE — NURSING NOTE
Performed stress test on patient. Patient tolerated procedure well. After procedure patient blood pressure dropped to 85/45. 500 cc NS bolus given. Ynes HIGUERA notified. Blood pressure 112/72. Nurse notified on floor. Patient states she feels better.

## 2024-12-12 NOTE — CONSULTS
GI CONSULT  NOTE:    Referring Provider:  Dr. Ordaz    Chief complaint: Chest pain    Subjective .     History of present illness: Patient is an 84-year-old female with history of hypertension, partial thyroidectomy, cholecystectomy, hiatal hernia, and colon polyps who presented to the hospital yesterday with complaints of chest pain which she is no longer experiencing currently.  She took an additional medication and reports her blood pressure dropping.  She passed out during her stress test today.  She occasionally has some nausea in the mornings.  No vomiting.  Chest pain does not present after she eats and she denies dysphagia.  She has occasional heartburn but symptoms are mostly controlled with Pepcid.  She used to take Prilosec for years.  She has no complaints of abdominal pain.  She uses stool softeners for constipation.  No bright red blood per rectum or melena.      Endo History:  1/2018 colonoscopy by Dr. Pablo -scarring and abnormal vascularity of terminal ileum with benign biopsy, TA colon polyp, internal hemorrhoids, benign random colon biopsies.  3/2017 EGD by Dr. Whitman -nonbleeding esophageal diverticulum with small opening and no stigmata of bleeding, nonobstructing Schatzki's ring, medium hiatal hernia.    Past Medical History:  Past Medical History:   Diagnosis Date    Arthritis     Disease of thyroid gland     parathyroid nodules    GERD (gastroesophageal reflux disease)     Hyperlipidemia     Hypertension     Lung nodules     Macular degeneration     of eyes    PONV (postoperative nausea and vomiting)     Skin cancer     Sleep apnea     uses c pap    Vertigo        Past Surgical History:  Past Surgical History:   Procedure Laterality Date    BREAST SURGERY      Jonatan breast bx's  benign    CARDIAC CATHETERIZATION      CHOLECYSTECTOMY      DILATATION AND CURETTAGE      x2    EYE SURGERY      cataracts    SKIN CANCER EXCISION      Face    THYROIDECTOMY Bilateral 10/12/2021    Procedure: NECK  EXPLORATION WITH LEFT PARATHYROID ADENOMA EXCISION AND FROZEN SECTION, INTRAOPERATIVE INTACT PTH ASSAY, RIGHT THYROIDECTOMY WITH FROZEN SECTION; LEFT PARTIAL THYROIDECTOMY; RECURRENT LARYNGEAL NERVE MONITORING;  Surgeon: Kenji Farley MD;  Location: [facility] MAIN OR;  Service: ENT;  Laterality: Bilateral;       Social History:  Social History     Tobacco Use    Smoking status: Never    Smokeless tobacco: Never   Vaping Use    Vaping status: Never Used   Substance Use Topics    Alcohol use: Not Currently     Alcohol/week: 2.0 standard drinks of alcohol     Types: 2 Shots of liquor per week    Drug use: Never       Family History:  Family History   Problem Relation Age of Onset    Stroke Mother     Heart disease Father        Medications:  Facility-Administered Medications Prior to Admission   Medication Dose Route Frequency Provider Last Rate Last Admin    hydrocortisone 2.5 % cream 1 Application  1 Application Topical Q12H Pacheco Zhang MD         Medications Prior to Admission   Medication Sig Dispense Refill Last Dose/Taking    acetaminophen (TYLENOL) 500 MG tablet Take 2 tablets by mouth Every 6 (Six) Hours As Needed for Mild Pain.   Taking As Needed    aspirin 81 MG chewable tablet Chew 1 tablet Daily. Last dose 10/04/21 per pt   Taking    Dupilumab (Dupixent) 300 MG/2ML solution auto-injector injection Inject  under the skin into the appropriate area as directed Every 14 (Fourteen) Days.   Taking    famotidine (PEPCID) 20 MG tablet Take 1 tablet by mouth 2 (Two) Times a Day.   Taking    levothyroxine (SYNTHROID, LEVOTHROID) 88 MCG tablet Take 1 tablet by mouth Daily.   Taking    meclizine (ANTIVERT) 25 MG tablet Take 1 tablet by mouth 3 (Three) Times a Day As Needed for Dizziness.   Taking As Needed    multivitamins-minerals (PRESERVISION AREDS 2) capsule capsule Take 1 capsule by mouth 2 (Two) Times a Day.   Taking    nitroglycerin (NITROSTAT) 0.4 MG SL tablet Place 1 tablet under the tongue.    "Taking    Probiotic Product (PROBIOTIC ADVANCED PO) Take 1 capsule by mouth Daily.   Taking    rosuvastatin (CRESTOR) 5 MG tablet Take 1 tablet by mouth.   Taking    sertraline (ZOLOFT) 50 MG tablet Take 1 tablet by mouth Every Night.   Taking    valsartan-hydrochlorothiazide (DIOVAN-HCT) 320-25 MG per tablet Take 1 tablet by mouth Daily.   Taking    Lancets (Safety Lancet 28G/Pressure Act) misc 1 each As Needed (test BG as needed once daily). 50 each 2        Scheduled Meds:aluminum-magnesium hydroxide-simethicone, 15 mL, Oral, Once  amLODIPine, 10 mg, Oral, Q24H  aspirin, 81 mg, Oral, Daily  famotidine, 20 mg, Oral, BID  valsartan, 320 mg, Oral, Nightly   And  hydroCHLOROthiazide, 25 mg, Oral, Nightly  isosorbide mononitrate, 30 mg, Oral, Q24H  levothyroxine, 88 mcg, Oral, Q AM  rosuvastatin, 5 mg, Oral, Nightly  senna-docusate sodium, 2 tablet, Oral, BID  sertraline, 50 mg, Oral, Nightly      Continuous Infusions:   PRN Meds:.  acetaminophen    senna-docusate sodium **AND** polyethylene glycol **AND** bisacodyl **AND** bisacodyl    melatonin    Morphine **AND** naloxone    nitroglycerin    ondansetron    sodium chloride    ALLERGIES:  Biaxin [clarithromycin], Epinephrine, and Procaine    ROS:  Review of Systems   Constitutional:  Negative for chills and fever.   HENT:  Negative for trouble swallowing.    Respiratory:  Negative for cough and shortness of breath.    Cardiovascular:  Positive for chest pain. Negative for palpitations.   Gastrointestinal:  Positive for nausea. Negative for abdominal pain, blood in stool and vomiting.   Neurological:  Positive for weakness. Negative for dizziness.   Psychiatric/Behavioral:  Negative for agitation and confusion.        Objective     Vital Signs:   Visit Vitals  /49   Pulse 71   Temp 98.2 °F (36.8 °C) (Oral)   Resp 16   Ht 157.5 cm (62\")   Wt 74.4 kg (164 lb)   SpO2 91%   BMI 30.00 kg/m²       Physical Exam:      General Appearance:    Awake and alert, in no acute " distress   Head:    Normocephalic, without obvious abnormality, atraumatic   Eyes:            Conjunctivae normal, anicteric sclera   Ears:    Ears appear intact with no abnormalities noted   Throat:   No oral lesions, no thrush, oral mucosa moist   Neck:   No adenopathy, supple, no thyromegaly, no JVD   Lungs:     Respirations regular, even and unlabored       Chest Wall:    No abnormalities observed   Abdomen:     Soft, non-tender, no rebound or guarding, non-distended, no hepatosplenomegaly   Rectal:     Deferred   Extremities:   No edema, no cyanosis, no redness   Pulses:   Pulses palpable and equal bilaterally   Skin:   No bleeding, bruising or rash, no jaundice       Neurologic:   Sensation intact       Results Review:   I reviewed the patient's labs and imaging.  CBC  Results from last 7 days   Lab Units 12/12/24  0256 12/11/24  1819   RBC 10*6/mm3 4.52 4.68   WBC 10*3/mm3 10.18 9.87   HEMOGLOBIN g/dL 12.3 12.9   PLATELETS 10*3/mm3 352 350       CMP  Results from last 7 days   Lab Units 12/12/24  0256 12/11/24  1819   SODIUM mmol/L 137 138   POTASSIUM mmol/L 4.0 3.7   CHLORIDE mmol/L 98 96*   CO2 mmol/L 27.4 29.7*   BUN mg/dL 26* 25*   CREATININE mg/dL 0.97 1.01*   GLUCOSE mg/dL 96 101*   ALBUMIN g/dL  --  4.3   BILIRUBIN mg/dL  --  0.2   ALK PHOS U/L  --  93   AST (SGOT) U/L  --  24   ALT (SGPT) U/L  --  16       Amylase and Lipase        CRP         Imaging Results (Last 24 Hours)       Procedure Component Value Units Date/Time    CT Angiogram Chest Pulmonary Embolism [[id number]] Collected: 12/11/24 2015     Updated: 12/11/24 2021    Narrative:      CT ANGIOGRAM CHEST PULMONARY EMBOLISM    Date of Exam: 12/11/2024 7:50 PM EST    Indication: pain, soa.    Comparison: None available.    Technique: Axial CT images were obtained of the chest after the uneventful intravenous administration of iodinated contrast utilizing pulmonary embolism protocol.  Sagittal and coronal reconstructions were performed.   Automated exposure control and   iterative reconstruction methods were used.    Findings: No consolidation. No pneumothorax or pleural effusion. The heart and pericardium are normal. Prior partial thyroidectomy. No evidence of aortic dissection. No pulmonary embolus. Calcified mediastinal lymph nodes consistent with prior   granulomatous disease. No consolidation. Nonspecific rounded pleural-based density in the left lower lobe measuring 1.9 cm x 1 cm.    Previous cholecystectomy. Otherwise the visualized upper abdomen is normal.    Thoracic vertebral body height and alignment are normal. The intervertebral disc spaces are maintained. The sternum is intact. No rib fractures.      Impression:      No acute cardiopulmonary disease. No pulmonary embolus.      Electronically Signed: Palomo Wilkinson MD    12/11/2024 8:19 PM EST    Workstation ID: [id number]    XR Chest 1 View [[id number]] Collected: 12/11/24 1930     Updated: 12/11/24 1933    Narrative:      XR CHEST 1 VW    Date of Exam: 12/11/2024 6:31 PM EST    Indication: Chest Pain Protocol  Chest Pain Protocol    Comparison: 11/26/2024    Findings:  Cardiomediastinal silhouette is unremarkable.  No airspace disease, pneumothorax, nor pleural effusion. No acute osseous abnormality identified.      Impression:      Impression:  No acute process identified      Electronically Signed: Gio Cordero MD    12/11/2024 7:31 PM EST    Workstation ID: [id number]              ASSESSMENT AND PLAN:  Chest pain  GERD  Hypertension  Hiatal hernia  History of esophageal diverticulum  History of cholecystectomy  Status post partial thyroidectomy    Principal Problem:    Chest pain     Plan:  84-year-old female admitted 12/11/2024 with chest pain.  Had stress test today with results pending.    She does have history of GERD and intermittent heartburn symptoms.  Currently takes Pepcid which controls symptoms fairly well.  Was on Prilosec for years.  She has no complaints of dysphagia.  CBC  is normal.  Liver enzymes normal.  CT PE protocol unremarkable.  Troponin 24.  She is being followed by cardiology.  Awaiting stress test results and she may be discharged home later today.  From a GI standpoint, she can be discharged today and we can arrange outpatient EGD.  If she remains in the hospital overnight, consider proceeding with her EGD tomorrow.  Recommend PPI but patient is unsure if she wants to take this.  Supportive care.    I discussed the patients findings and my recommendations with the patient.  Kassie Rubin, DONNIE  12/12/24  15:58 EST

## 2024-12-12 NOTE — CASE MANAGEMENT/SOCIAL WORK
Discharge Planning Assessment   Herbie     Patient Name: Kathryn More  MRN: [id number]  Today's Date: 12/12/2024    Admit Date: 12/11/2024    Plan: Routine home   Discharge Needs Assessment       Row Name 12/12/24 1322       Living Environment    People in Home alone    Current Living Arrangements home    Potentially Unsafe Housing Conditions none    In the past 12 months has the electric, gas, oil, or water company threatened to shut off services in your home? No    Primary Care Provided by self    Provides Primary Care For no one    Family Caregiver if Needed friend(s);grandchild(kelly), adult    Family Caregiver Names friend Anjel or grandchild Abida    Quality of Family Relationships helpful;involved;supportive    Able to Return to Prior Arrangements yes       Resource/Environmental Concerns    Resource/Environmental Concerns none    Transportation Concerns none       Transportation Needs    In the past 12 months, has lack of transportation kept you from medical appointments or from getting medications? no    In the past 12 months, has lack of transportation kept you from meetings, work, or from getting things needed for daily living? No       Food Insecurity    Within the past 12 months, you worried that your food would run out before you got the money to buy more. Never true    Within the past 12 months, the food you bought just didn't last and you didn't have money to get more. Never true       Transition Planning    Patient/Family Anticipates Transition to home    Patient/Family Anticipated Services at Transition none    Transportation Anticipated car, drives self;family or friend will provide       Discharge Needs Assessment    Readmission Within the Last 30 Days no previous admission in last 30 days    Equipment Currently Used at Home cpap  does not use.    Concerns to be Addressed denies needs/concerns at this time    Anticipated Changes Related to Illness none    Equipment Needed After Discharge  none                   Discharge Plan       Row Name 12/12/24 1326       Plan    Plan Routine home    Plan Comments CM met with patient at the bedside. Confirmed PCP, insurance, and pharmacy. Patient declined M2B as she does not have her wallet on her to pay for meds. Patient denies any difficulty affording medications. Patient is not current with any HHC/OPPT/OT services. Patient lives at home alone, is independent with ADLS/IADLS, and drives. Friend Anjel or Granddaughter Abida able to provide DC transport. DC Barriers: GI and Cardiology following, myoview and echo pending.                  Continued Care and Services - Admitted Since 12/11/2024    No active coordination exists for this encounter.       Expected Discharge Date and Time       Expected Discharge Date Expected Discharge Time    Dec 13, 2024            Demographic Summary       Row Name 12/12/24 1321       General Information    Admission Type observation    Arrived From emergency department    Required Notices Provided Observation Status Notice    Referral Source admission list    Reason for Consult discharge planning    Preferred Language English       Contact Information    Permission Granted to Share Info With     Contact Information Obtained for                    Functional Status       Row Name 12/12/24 1322       Functional Status    Usual Activity Tolerance good    Current Activity Tolerance moderate       Functional Status, IADL    Medications independent    Meal Preparation independent    Housekeeping independent    Laundry independent    Shopping independent           Abbe Zuniga RN     Cell number [phone]  Office number [phone]

## 2024-12-12 NOTE — DISCHARGE SUMMARY
"[facility]    Marcus Field MD    CHIEF COMPLAINT:     Chest pain    HISTORY OF PRESENT ILLNESS:    Obtained from admitting physician HPI on 12/11/2024:  84-year-old female describes left-sided chest pain that she states felt like a heart spasm or indigestion since yesterday.  She reports no relieving or exacerbating factors.  She states she has had a cough over the last 1 week.  She reports no hemoptysis.  She states she has had issues with her potassium being low in the past.  She called her doctor today and they told her to come to the hospital for her symptoms.    12/12/24:  Patient confirms the HPI noted above including approximately 2-day history of some left-sided chest pain described as if she is \"having a heart spasm\" though she feels this is significantly different from episodes of palpitations she has had previously.  Some dyspnea worse with exertion and progressive now to the point of dyspnea and fatigue with walking short distances on level ground or when performing ADLs such as putting on clothes.  Additionally patient reports that she previously was on PPI therapy however her dermatologist thought that this may be contributing to her eczema and it was switched to twice daily famotidine.  She does not smoke or drink alcohol          Past Medical History:   Diagnosis Date    Arthritis     Disease of thyroid gland     parathyroid nodules    GERD (gastroesophageal reflux disease)     Hyperlipidemia     Hypertension     Lung nodules     Macular degeneration     of eyes    PONV (postoperative nausea and vomiting)     Skin cancer     Sleep apnea     uses c pap    Vertigo      Past Surgical History:   Procedure Laterality Date    BREAST SURGERY      Jonatan breast bx's  benign    CARDIAC CATHETERIZATION      CHOLECYSTECTOMY      DILATATION AND CURETTAGE      x2    EYE SURGERY      cataracts    SKIN CANCER EXCISION      Face    THYROIDECTOMY Bilateral 10/12/2021    Procedure: NECK " EXPLORATION WITH LEFT PARATHYROID ADENOMA EXCISION AND FROZEN SECTION, INTRAOPERATIVE INTACT PTH ASSAY, RIGHT THYROIDECTOMY WITH FROZEN SECTION; LEFT PARTIAL THYROIDECTOMY; RECURRENT LARYNGEAL NERVE MONITORING;  Surgeon: Kenji Farley MD;  Location: [facility] MAIN OR;  Service: ENT;  Laterality: Bilateral;     Family History   Problem Relation Age of Onset    Stroke Mother     Heart disease Father      Social History     Tobacco Use    Smoking status: Never    Smokeless tobacco: Never   Vaping Use    Vaping status: Never Used   Substance Use Topics    Alcohol use: Not Currently     Alcohol/week: 2.0 standard drinks of alcohol     Types: 2 Shots of liquor per week    Drug use: Never     Facility-Administered Medications Prior to Admission   Medication Dose Route Frequency Provider Last Rate Last Admin    hydrocortisone 2.5 % cream 1 Application  1 Application Topical Q12H Pacheco Zhang MD         Medications Prior to Admission   Medication Sig Dispense Refill Last Dose/Taking    acetaminophen (TYLENOL) 500 MG tablet Take 2 tablets by mouth Every 6 (Six) Hours As Needed for Mild Pain.   Taking As Needed    aspirin 81 MG chewable tablet Chew 1 tablet Daily. Last dose 10/04/21 per pt   Taking    Dupilumab (Dupixent) 300 MG/2ML solution auto-injector injection Inject  under the skin into the appropriate area as directed Every 14 (Fourteen) Days.   Taking    famotidine (PEPCID) 20 MG tablet Take 1 tablet by mouth 2 (Two) Times a Day.   Taking    levothyroxine (SYNTHROID, LEVOTHROID) 88 MCG tablet Take 1 tablet by mouth Daily.   Taking    meclizine (ANTIVERT) 25 MG tablet Take 1 tablet by mouth 3 (Three) Times a Day As Needed for Dizziness.   Taking As Needed    multivitamins-minerals (PRESERVISION AREDS 2) capsule capsule Take 1 capsule by mouth 2 (Two) Times a Day.   Taking    nitroglycerin (NITROSTAT) 0.4 MG SL tablet Place 1 tablet under the tongue.   Taking    Probiotic Product (PROBIOTIC ADVANCED PO) Take 1  capsule by mouth Daily.   Taking    rosuvastatin (CRESTOR) 5 MG tablet Take 1 tablet by mouth.   Taking    sertraline (ZOLOFT) 50 MG tablet Take 1 tablet by mouth Every Night.   Taking    valsartan-hydrochlorothiazide (DIOVAN-HCT) 320-25 MG per tablet Take 1 tablet by mouth Daily.   Taking    Lancets (Safety Lancet 28G/Pressure Act) misc 1 each As Needed (test BG as needed once daily). 50 each 2      Allergies:  Biaxin [clarithromycin], Epinephrine, and Procaine    Immunization History   Administered Date(s) Administered    ABRYSVO (RSV, 60+ or pregnant women 32-36 wks) 11/10/2023    COVID-19 (MODERNA) 12YRS+ (SPIKEVAX) 02/20/2024, 10/28/2024    COVID-19 (MODERNA) 1st,2nd,3rd Dose Monovalent 01/14/2021, 02/19/2021, 10/27/2021, 08/25/2022    COVID-19 (MODERNA) 6-11 YRS Monovalent 08/25/2022, 11/02/2022    COVID-19 (MODERNA) BIVALENT 12+YRS 11/02/2022    FLUAD TRI 65YR+ 10/21/2024    Fluad Quad 65+ 09/27/2021    Fluzone High-Dose 65+yrs 10/25/2022, 11/14/2023    Pneumococcal Polysaccharide (PPSV23) 10/07/2002, 10/15/2020    Td (TDVAX) 01/13/1999           REVIEW OF SYSTEMS:    Review of Systems   Constitutional: Positive for malaise/fatigue.   HENT: Negative.     Eyes: Negative.    Cardiovascular:  Positive for chest pain and dyspnea on exertion.   Respiratory:  Positive for shortness of breath.    Skin: Negative.    Musculoskeletal: Negative.    Gastrointestinal:  Positive for heartburn.   Genitourinary: Negative.    Neurological: Negative.    Psychiatric/Behavioral: Negative.       Vital Signs  Temp:  [98.1 °F (36.7 °C)-98.7 °F (37.1 °C)] 98.2 °F (36.8 °C)  Heart Rate:  [63-99] 88  Resp:  [16-24] 16  BP: ()/(38-91) 121/67          Physical Exam:  Physical Exam  Vitals reviewed.   Constitutional:       General: She is not in acute distress.     Appearance: Normal appearance. She is normal weight. She is not ill-appearing, toxic-appearing or diaphoretic.   HENT:      Head: Normocephalic.      Right Ear: External  ear normal.      Left Ear: External ear normal.      Nose: Nose normal.      Mouth/Throat:      Mouth: Mucous membranes are moist.   Eyes:      Extraocular Movements: Extraocular movements intact.   Cardiovascular:      Rate and Rhythm: Normal rate and regular rhythm.      Pulses: Normal pulses.   Pulmonary:      Effort: Pulmonary effort is normal.      Breath sounds: Normal breath sounds.   Abdominal:      General: Bowel sounds are normal.      Palpations: Abdomen is soft.   Musculoskeletal:         General: Normal range of motion.      Cervical back: Normal range of motion.      Right lower leg: No edema.      Left lower leg: No edema.   Skin:     General: Skin is warm and dry.      Capillary Refill: Capillary refill takes less than 2 seconds.   Neurological:      General: No focal deficit present.      Mental Status: She is alert.   Psychiatric:         Mood and Affect: Mood normal.         Behavior: Behavior normal.         Thought Content: Thought content normal.         Judgment: Judgment normal.         Emotional Behavior:   Normal   Debilities:  None  Results Review:    I reviewed the patient's new clinical results.  Lab Results (most recent)       Procedure Component Value Units Date/Time    Lipid Panel [[id number]]  (Abnormal) Collected: 12/12/24 0256    Specimen: Blood Updated: 12/12/24 0956     Total Cholesterol 148 mg/dL      Triglycerides 152 mg/dL      HDL Cholesterol 51 mg/dL      LDL Cholesterol  71 mg/dL      VLDL Cholesterol 26 mg/dL      LDL/HDL Ratio 1.31    Narrative:      Cholesterol Reference Ranges  (U.S. Department of Health and Human Services ATP III Classifications)    Desirable          <200 mg/dL  Borderline High    200-239 mg/dL  High Risk          >240 mg/dL      Triglyceride Reference Ranges  (U.S. Department of Health and Human Services ATP III Classifications)    Normal           <150 mg/dL  Borderline High  150-199 mg/dL  High             200-499 mg/dL  Very High        >500  mg/dL    HDL Reference Ranges  (U.S. Department of Health and Human Services ATP III Classifications)    Low     <40 mg/dl (major risk factor for CHD)  High    >60 mg/dl ('negative' risk factor for CHD)        LDL Reference Ranges  (U.S. Department of Health and Human Services ATP III Classifications)    Optimal          <100 mg/dL  Near Optimal     100-129 mg/dL  Borderline High  130-159 mg/dL  High             160-189 mg/dL  Very High        >189 mg/dL    High Sensitivity Troponin T [[id number]]  (Abnormal) Collected: 12/12/24 0256    Specimen: Blood Updated: 12/12/24 0521     HS Troponin T 24 ng/L     Narrative:      High Sensitive Troponin T Reference Range:  <14.0 ng/L- Negative Female for AMI  <22.0 ng/L- Negative Male for AMI  >=14 - Abnormal Female indicating possible myocardial injury.  >=22 - Abnormal Male indicating possible myocardial injury.   Clinicians would have to utilize clinical acumen, EKG, Troponin, and serial changes to determine if it is an Acute Myocardial Infarction or myocardial injury due to an underlying chronic condition.         Basic Metabolic Panel [[id number]]  (Abnormal) Collected: 12/12/24 0256    Specimen: Blood Updated: 12/12/24 0521     Glucose 96 mg/dL      BUN 26 mg/dL      Creatinine 0.97 mg/dL      Sodium 137 mmol/L      Potassium 4.0 mmol/L      Chloride 98 mmol/L      CO2 27.4 mmol/L      Calcium 9.9 mg/dL      BUN/Creatinine Ratio 26.8     Anion Gap 11.6 mmol/L      eGFR 57.7 mL/min/1.73     Narrative:      GFR Categories in Chronic Kidney Disease (CKD)      GFR Category          GFR (mL/min/1.73)    Interpretation  G1                     90 or greater         Normal or high (1)  G2                      60-89                Mild decrease (1)  G3a                   45-59                Mild to moderate decrease  G3b                   30-44                Moderate to severe decrease  G4                    15-29                Severe decrease  G5                    14 or less            Kidney failure          (1)In the absence of evidence of kidney disease, neither GFR category G1 or G2 fulfill the criteria for CKD.    eGFR calculation 2021 CKD-EPI creatinine equation, which does not include race as a factor    CBC & Differential [[id number]]  (Abnormal) Collected: 12/12/24 0256    Specimen: Blood Updated: 12/12/24 0454    Narrative:      The following orders were created for panel order CBC & Differential.  Procedure                               Abnormality         Status                     ---------                               -----------         ------                     CBC Auto Differential[[id number]]        Abnormal            Final result                 Please view results for these tests on the individual orders.    CBC Auto Differential [[id number]]  (Abnormal) Collected: 12/12/24 0256    Specimen: Blood Updated: 12/12/24 0454     WBC 10.18 10*3/mm3      RBC 4.52 10*6/mm3      Hemoglobin 12.3 g/dL      Hematocrit 38.3 %      MCV 84.7 fL      MCH 27.2 pg      MCHC 32.1 g/dL      RDW 13.3 %      RDW-SD 41.2 fl      MPV 9.2 fL      Platelets 352 10*3/mm3      Neutrophil % 68.6 %      Lymphocyte % 17.6 %      Monocyte % 10.9 %      Eosinophil % 2.1 %      Basophil % 0.5 %      Immature Grans % 0.3 %      Neutrophils, Absolute 6.99 10*3/mm3      Lymphocytes, Absolute 1.79 10*3/mm3      Monocytes, Absolute 1.11 10*3/mm3      Eosinophils, Absolute 0.21 10*3/mm3      Basophils, Absolute 0.05 10*3/mm3      Immature Grans, Absolute 0.03 10*3/mm3      nRBC 0.0 /100 WBC     Respiratory Panel PCR w/COVID-19(SARS-CoV-2) KOURTNEY/ALIREZA/SHAWN/PAD/COR/MATTHEW In-House, NP Swab in UTM/VTM, 2 HR TAT - Swab, Nasopharynx [[id number]]  (Normal) Collected: 12/11/24 1819    Specimen: Swab from Nasopharynx Updated: 12/11/24 2002     ADENOVIRUS, PCR Not Detected     Coronavirus 229E Not Detected     Coronavirus HKU1 Not Detected     Coronavirus NL63 Not Detected     Coronavirus OC43 Not Detected     COVID19 Not  Detected     Human Metapneumovirus Not Detected     Human Rhinovirus/Enterovirus Not Detected     Influenza A PCR Not Detected     Influenza B PCR Not Detected     Parainfluenza Virus 1 Not Detected     Parainfluenza Virus 2 Not Detected     Parainfluenza Virus 3 Not Detected     Parainfluenza Virus 4 Not Detected     RSV, PCR Not Detected     Bordetella pertussis pcr Not Detected     Bordetella parapertussis PCR Not Detected     Chlamydophila pneumoniae PCR Not Detected     Mycoplasma pneumo by PCR Not Detected    Narrative:      In the setting of a positive respiratory panel with a viral infection PLUS a negative procalcitonin without other underlying concern for bacterial infection, consider observing off antibiotics or discontinuation of antibiotics and continue supportive care. If the respiratory panel is positive for atypical bacterial infection (Bordetella pertussis, Chlamydophila pneumoniae, or Mycoplasma pneumoniae), consider antibiotic de-escalation to target atypical bacterial infection.    High Sensitivity Troponin T 1Hr [[id number]]  (Abnormal) Collected: 12/11/24 1915    Specimen: Blood Updated: 12/11/24 1939     HS Troponin T 19 ng/L      Troponin T Delta -1 ng/L      Troponin T % Change -5 %     Narrative:      High Sensitive Troponin T Reference Range:  <14.0 ng/L- Negative Female for AMI  <22.0 ng/L- Negative Male for AMI  >=14 - Abnormal Female indicating possible myocardial injury.  >=22 - Abnormal Male indicating possible myocardial injury.   Clinicians would have to utilize clinical acumen, EKG, Troponin, and serial changes to determine if it is an Acute Myocardial Infarction or myocardial injury due to an underlying chronic condition.         Comprehensive Metabolic Panel [[id number]]  (Abnormal) Collected: 12/11/24 1819    Specimen: Blood Updated: 12/11/24 1848     Glucose 101 mg/dL      BUN 25 mg/dL      Creatinine 1.01 mg/dL      Sodium 138 mmol/L      Potassium 3.7 mmol/L      Chloride 96  mmol/L      CO2 29.7 mmol/L      Calcium 10.4 mg/dL      Total Protein 7.3 g/dL      Albumin 4.3 g/dL      ALT (SGPT) 16 U/L      AST (SGOT) 24 U/L      Alkaline Phosphatase 93 U/L      Total Bilirubin 0.2 mg/dL      Globulin 3.0 gm/dL      A/G Ratio 1.4 g/dL      BUN/Creatinine Ratio 24.8     Anion Gap 12.3 mmol/L      eGFR 55.0 mL/min/1.73     Narrative:      GFR Categories in Chronic Kidney Disease (CKD)      GFR Category          GFR (mL/min/1.73)    Interpretation  G1                     90 or greater         Normal or high (1)  G2                      60-89                Mild decrease (1)  G3a                   45-59                Mild to moderate decrease  G3b                   30-44                Moderate to severe decrease  G4                    15-29                Severe decrease  G5                    14 or less           Kidney failure          (1)In the absence of evidence of kidney disease, neither GFR category G1 or G2 fulfill the criteria for CKD.    eGFR calculation 2021 CKD-EPI creatinine equation, which does not include race as a factor    High Sensitivity Troponin T [[id number]]  (Abnormal) Collected: 12/11/24 1819    Specimen: Blood Updated: 12/11/24 1848     HS Troponin T 20 ng/L     Narrative:      High Sensitive Troponin T Reference Range:  <14.0 ng/L- Negative Female for AMI  <22.0 ng/L- Negative Male for AMI  >=14 - Abnormal Female indicating possible myocardial injury.  >=22 - Abnormal Male indicating possible myocardial injury.   Clinicians would have to utilize clinical acumen, EKG, Troponin, and serial changes to determine if it is an Acute Myocardial Infarction or myocardial injury due to an underlying chronic condition.         D-dimer, Quantitative [[id number]]  (Abnormal) Collected: 12/11/24 1819    Specimen: Blood Updated: 12/11/24 1844     D-Dimer, Quantitative 0.93 MCGFEU/mL     Narrative:      According to the assay 's published package insert, a normal (<0.50  "MCGFEU/mL) D-dimer result in conjunction with a non-high clinical probability assessment, excludes deep vein thrombosis (DVT) and pulmonary embolism (PE) with high sensitivity.    D-dimer values increase with age and this can make VTE exclusion of an older population difficult. To address this, the [employer], based on best available evidence and recent guidelines, recommends that clinicians use age-adjusted D-dimer thresholds in patients greater than 50 years of age with: a) a low probability of PE who do not meet all Pulmonary Embolism Rule Out Criteria, or b) in those with intermediate probability of PE.   The formula for an age-adjusted D-dimer cut-off is \"age/100\".  For example, a 60 year old patient would have an age-adjusted cut-off of 0.60 MCGFEU/mL and an 80 year old 0.80 MCGFEU/mL.    [facility] Draw [[id number]] Collected: 12/11/24 1819    Specimen: Blood Updated: 12/11/24 1832    Narrative:      The following orders were created for panel order [facility] Draw.  Procedure                               Abnormality         Status                     ---------                               -----------         ------                     Green Top (Gel)[[id number]]                                  Final result               Lavender Top[[id number]]                                     Final result               Gold Top - SST[[id number]]                                   Final result               Light Blue Top[[id number]]                                   Final result                 Please view results for these tests on the individual orders.    Green Top (Gel) [[id number]] Collected: 12/11/24 1819    Specimen: Blood Updated: 12/11/24 1832     Extra Tube Hold for add-ons.     Comment: Auto resulted.       Lavender Top [[id number]] Collected: 12/11/24 1819    Specimen: Blood Updated: 12/11/24 1832     Extra Tube hold for add-on     Comment: Auto resulted       Gold Top - SST [[id number]] Collected: " 12/11/24 1819    Specimen: Blood Updated: 12/11/24 1832     Extra Tube Hold for add-ons.     Comment: Auto resulted.       Light Blue Top [[id number]] Collected: 12/11/24 1819    Specimen: Blood Updated: 12/11/24 1832     Extra Tube Hold for add-ons.     Comment: Auto resulted       CBC & Differential [[id number]]  (Abnormal) Collected: 12/11/24 1819    Specimen: Blood Updated: 12/11/24 1829    Narrative:      The following orders were created for panel order CBC & Differential.  Procedure                               Abnormality         Status                     ---------                               -----------         ------                     CBC Auto Differential[[id number]]        Abnormal            Final result                 Please view results for these tests on the individual orders.    CBC Auto Differential [[id number]]  (Abnormal) Collected: 12/11/24 1819    Specimen: Blood Updated: 12/11/24 1829     WBC 9.87 10*3/mm3      RBC 4.68 10*6/mm3      Hemoglobin 12.9 g/dL      Hematocrit 39.4 %      MCV 84.2 fL      MCH 27.6 pg      MCHC 32.7 g/dL      RDW 13.1 %      RDW-SD 40.4 fl      MPV 9.1 fL      Platelets 350 10*3/mm3      Neutrophil % 74.9 %      Lymphocyte % 14.9 %      Monocyte % 8.2 %      Eosinophil % 1.1 %      Basophil % 0.4 %      Immature Grans % 0.5 %      Neutrophils, Absolute 7.39 10*3/mm3      Lymphocytes, Absolute 1.47 10*3/mm3      Monocytes, Absolute 0.81 10*3/mm3      Eosinophils, Absolute 0.11 10*3/mm3      Basophils, Absolute 0.04 10*3/mm3      Immature Grans, Absolute 0.05 10*3/mm3      nRBC 0.0 /100 WBC             Imaging Results (Most Recent)       Procedure Component Value Units Date/Time    CT Angiogram Chest Pulmonary Embolism [[id number]] Collected: 12/11/24 2015     Updated: 12/11/24 2021    Narrative:      CT ANGIOGRAM CHEST PULMONARY EMBOLISM    Date of Exam: 12/11/2024 7:50 PM EST    Indication: pain, soa.    Comparison: None available.    Technique: Axial CT images were  obtained of the chest after the uneventful intravenous administration of iodinated contrast utilizing pulmonary embolism protocol.  Sagittal and coronal reconstructions were performed.  Automated exposure control and   iterative reconstruction methods were used.    Findings: No consolidation. No pneumothorax or pleural effusion. The heart and pericardium are normal. Prior partial thyroidectomy. No evidence of aortic dissection. No pulmonary embolus. Calcified mediastinal lymph nodes consistent with prior   granulomatous disease. No consolidation. Nonspecific rounded pleural-based density in the left lower lobe measuring 1.9 cm x 1 cm.    Previous cholecystectomy. Otherwise the visualized upper abdomen is normal.    Thoracic vertebral body height and alignment are normal. The intervertebral disc spaces are maintained. The sternum is intact. No rib fractures.      Impression:      No acute cardiopulmonary disease. No pulmonary embolus.      Electronically Signed: Palomo Wilkinson MD    12/11/2024 8:19 PM EST    Workstation ID: [id number]    XR Chest 1 View [[id number]] Collected: 12/11/24 1930     Updated: 12/11/24 1933    Narrative:      XR CHEST 1 VW    Date of Exam: 12/11/2024 6:31 PM EST    Indication: Chest Pain Protocol  Chest Pain Protocol    Comparison: 11/26/2024    Findings:  Cardiomediastinal silhouette is unremarkable.  No airspace disease, pneumothorax, nor pleural effusion. No acute osseous abnormality identified.      Impression:      Impression:  No acute process identified      Electronically Signed: Gio Cordero MD    12/11/2024 7:31 PM EST    Workstation ID: [id number]          reviewed    ECG/EMG Results (most recent)       Procedure Component Value Units Date/Time    ECG 12 Lead Chest Pain [[id number]] Collected: 12/11/24 1720     Updated: 12/12/24 0731     QT Interval 368 ms      QTC Interval 429 ms     Narrative:      HEART RATE=81  bpm  RR Rsgnmwvt=380  ms  VA Nwrpqrsv=348  ms  P Horizontal Axis=-35   deg  P Front Axis=42  deg  QRSD Interval=85  ms  QT Irewuikx=781  ms  SPgI=354  ms  QRS Axis=115  deg  T Wave Axis=-5  deg  - OTHERWISE NORMAL ECG -  Sinus rhythm  Right axis deviation  Electronically Signed By: Jordy Ordaz (Domenic) 2024-12-12 07:30:43  Date and Time of Study:2024-12-11 17:20:13    Telemetry Scan [[id number]] Resulted: 12/11/24     Updated: 12/12/24 1325    Telemetry Scan [[id number]] Resulted: 12/11/24     Updated: 12/12/24 1325    Adult Transthoracic Echo Complete W/ Cont if Necessary Per Protocol [[id number]] Resulted: 12/12/24 1257     Updated: 12/12/24 1533     LV GLOBAL STRAIN  -17.9 %      LVIDd 4.1 cm      LVIDs 2.6 cm      IVSd 1.20 cm      LVPWd 0.90 cm      FS 36.6 %      IVS/LVPW 1.33 cm      ESV(cubed) 17.6 ml      LV Sys Vol (BSA corrected) 14.5 cm2      EDV(cubed) 68.9 ml      LV Hurley Vol (BSA corrected) 34.5 cm2      LV mass(C)d 141.5 grams      LVOT area 2.5 cm2      LVOT diam 1.80 cm      EDV(MOD-sp4) 60.6 ml      ESV(MOD-sp4) 25.4 ml      SV(MOD-sp4) 35.2 ml      SVi(MOD-SP4) 20.0 ml/m2      SVi (LVOT) 42.3 ml/m2      EF(MOD-sp4) 58.1 %      MV E max heri 68.7 cm/sec      MV A max heri 94.3 cm/sec      MV dec time 0.20 sec      MV E/A 0.73     Med Peak E' Heri 6.3 cm/sec      Lat Peak E' Heri 8.1 cm/sec      Avg E/e' ratio 9.54     SV(LVOT) 74.3 ml      RVIDd 2.00 cm      TAPSE (>1.6) 1.91 cm      RV S' 16.8 cm/sec      LA dimension (2D)  2.8 cm      LV V1 max 175.0 cm/sec      LV V1 max PG 12.3 mmHg      LV V1 mean PG 7.0 mmHg      LV V1 VTI 29.2 cm      Ao pk heri 175.0 cm/sec      Ao max PG 12.3 mmHg      Ao mean PG 7.0 mmHg      Ao V2 VTI 29.8 cm      MARCO A(I,D) 2.49 cm2      AI P1/2t 495.7 msec      MV max PG 6.9 mmHg      MV mean PG 3.0 mmHg      MV V2 VTI 26.3 cm      MV P1/2t 43.1 msec      MVA(P1/2t) 5.1 cm2      MVA(VTI) 2.8 cm2      MV dec slope 917.0 cm/sec2      RV V1 max PG 4.8 mmHg      RV V1 max 109.0 cm/sec      RV V1 VTI 20.9 cm      PA V2 max 132.0 cm/sec      PA acc  time 0.08 sec      ACS 1.80 cm      Sinus 2.8 cm      STJ 2.20 cm      EF(MOD-bp) 58.0 %      Dimensionless Index 1.00 (DI)           reviewed            Microbiology Results (last 10 days)       Procedure Component Value - Date/Time    Respiratory Panel PCR w/COVID-19(SARS-CoV-2) KOURTNEY/ALIREZA/SHAWN/PAD/COR/MATTHEW In-House, NP Swab in UTM/VTM, 2 HR TAT - Swab, Nasopharynx [[id number]]  (Normal) Collected: 12/11/24 1819    Lab Status: Final result Specimen: Swab from Nasopharynx Updated: 12/11/24 2002     ADENOVIRUS, PCR Not Detected     Coronavirus 229E Not Detected     Coronavirus HKU1 Not Detected     Coronavirus NL63 Not Detected     Coronavirus OC43 Not Detected     COVID19 Not Detected     Human Metapneumovirus Not Detected     Human Rhinovirus/Enterovirus Not Detected     Influenza A PCR Not Detected     Influenza B PCR Not Detected     Parainfluenza Virus 1 Not Detected     Parainfluenza Virus 2 Not Detected     Parainfluenza Virus 3 Not Detected     Parainfluenza Virus 4 Not Detected     RSV, PCR Not Detected     Bordetella pertussis pcr Not Detected     Bordetella parapertussis PCR Not Detected     Chlamydophila pneumoniae PCR Not Detected     Mycoplasma pneumo by PCR Not Detected    Narrative:      In the setting of a positive respiratory panel with a viral infection PLUS a negative procalcitonin without other underlying concern for bacterial infection, consider observing off antibiotics or discontinuation of antibiotics and continue supportive care. If the respiratory panel is positive for atypical bacterial infection (Bordetella pertussis, Chlamydophila pneumoniae, or Mycoplasma pneumoniae), consider antibiotic de-escalation to target atypical bacterial infection.            Assessment & Plan     Chest pain       Chest pain  Lab Results   Component Value Date    TROPONINT 24 (H) 12/12/2024    TROPONINT 19 (H) 12/11/2024    TROPONINT 20 (H) 12/11/2024   -D-dimer: 0.93  -Lipid panel total cholesterol of 148 with an  LDL of 71 and HDL of 51  -Respiratory panel negative  -Chest X-ray: No acute process  -CT PE protocol showed no acute cardiopulmonary disease with no pulmonary embolism noted  -EKG: Sinus rhythm at 81 with some T wave inversion noted in lead III but no obvious acute changes with a QTc of 429 ms  -In the ED pt given 324 mg aspirin, famotidine  -Cardiology consulted who recommended GI consultation, MCOT at discharge, added low-dose amlodipine and subsequently ordered Imdur, echocardiogram and stress test  -GI recommended consideration for resuming PPI and discussed inpatient versus outpatient EGD however given reassuring cardiac results will plan this outpatient  -Stress Test showed normal Myocard perfusion imaging without evidence of ischemia consistent with a low risk study with a hyperdynamic EF calculated at greater than 70% with some GI artifact present  -Telemetry  -NPO  -Continue aspirin, statin and famotidine    Hypertension  -Moderately controlled   BP Readings from Last 1 Encounters:   12/12/24 121/67   - Continue valsartan-hydrochlorothiazide and amlodipine added by cardiology  - Monitor while admitted      I discussed the patients findings and my recommendations with patient and nursing staff.     Discharge Diagnosis:      Chest pain      Hospital Course  Patient is a 84 y.o. female presented with chest pain with TALBERT and fatigue with an HPI noted above.  Serial troponins were assessed minimi 20, 19, 24 with D-dimer within normal limits at 0.93 and lipid panel showing a total cholesterol of 148 with an LDL of 71 and HDL of 51.  Respiratory panel was obtained and was negative and chest x-ray showed no acute process.  CT PE protocol was subsequently ordered which showed no acute cardiopulmonary disease or pulmonary embolism.  EKG was obtained and showed sinus rhythm at 81 with T wave inversion noted in lead III but no other obvious acute changes noted and she was continued on telemetry without other  significant events reported.  Patient received 324 mg aspirin as well as famotidine in the ED and cardiology was consulted who evaluated patient and initiated amlodipine therapy as well as Imdur and ordered echocardiogram and stress test.  Stress test showed normal myocardial perfusion imaging without evidence of ischemia consistent with a low risk study with a hyperdynamic EF.  Echocardiogram was obtained on 12/12/2024.  Cardiology provider also recommended GI consultation given symptoms and recent discontinuation of PPI with GI provider.  Shortly after administration of amlodipine and Imdur patient became hypotensive with a blood pressure of 70 systolic, was pale and diaphoretic and experienced some nausea and vomiting.  She was laid supine and symptoms resolved with improved blood pressure within 5 to 10 minutes.  She also experienced similar symptoms when injected with tracer for Myoview.  GI evaluated patient and recommended resuming PPI though patient is reluctant to pursue this at this time and also recommended EGD which they state could be done as an inpatient the following day if patient required continued hospitalization for further cardiac evaluation and if not would be scheduled as an outpatient.  Orthostatic vital signs were reassessed on the evening of 12/12 and found to be unremarkable.  She has an appointment previously scheduled with primary cardiologist on 1/10 and will monitor and log heart rates and blood pressures to discuss further therapy with provider at that time.  At this time patient is felt to be in good condition for discharge with close follow-up with her PCP as well as cardiology and GI on an outpatient basis.  Her full testing/results and plan were discussed with patient along with concerning/alarm symptoms for which to call 911/return to the ED.  All questions were answered and she verbalizes her understanding and agreement.    Past Medical History:     Past Medical History:    Diagnosis Date    Arthritis     Disease of thyroid gland     parathyroid nodules    GERD (gastroesophageal reflux disease)     Hyperlipidemia     Hypertension     Lung nodules     Macular degeneration     of eyes    PONV (postoperative nausea and vomiting)     Skin cancer     Sleep apnea     uses c pap    Vertigo        Past Surgical History:     Past Surgical History:   Procedure Laterality Date    BREAST SURGERY      Jonatan breast bx's  benign    CARDIAC CATHETERIZATION      CHOLECYSTECTOMY      DILATATION AND CURETTAGE      x2    EYE SURGERY      cataracts    SKIN CANCER EXCISION      Face    THYROIDECTOMY Bilateral 10/12/2021    Procedure: NECK EXPLORATION WITH LEFT PARATHYROID ADENOMA EXCISION AND FROZEN SECTION, INTRAOPERATIVE INTACT PTH ASSAY, RIGHT THYROIDECTOMY WITH FROZEN SECTION; LEFT PARTIAL THYROIDECTOMY; RECURRENT LARYNGEAL NERVE MONITORING;  Surgeon: Kenji Farley MD;  Location: [facility] MAIN OR;  Service: ENT;  Laterality: Bilateral;       Social History:   Social History     Socioeconomic History    Marital status:     Number of children: 1   Tobacco Use    Smoking status: Never    Smokeless tobacco: Never   Vaping Use    Vaping status: Never Used   Substance and Sexual Activity    Alcohol use: Not Currently     Alcohol/week: 2.0 standard drinks of alcohol     Types: 2 Shots of liquor per week    Drug use: Never    Sexual activity: Not Currently       Procedures Performed         Consults:   Consults       Date and Time Order Name Status Description    12/12/2024  8:46 AM Inpatient Gastroenterology Consult Completed     12/11/2024  9:10 PM Inpatient Cardiology Consult Completed             Condition on Discharge:     Stable    Discharge Disposition  Home or Self Care    Discharge Medications     Discharge Medications        Continue These Medications        Instructions Start Date   acetaminophen 500 MG tablet  Commonly known as: TYLENOL   1,000 mg, Every 6 Hours PRN      aspirin 81 MG  chewable tablet   81 mg, Oral, Daily, Last dose 10/04/21 per pt      Dupixent 300 MG/2ML solution auto-injector injection  Generic drug: Dupilumab   Every 14 Days      famotidine 20 MG tablet  Commonly known as: PEPCID   20 mg, 2 Times Daily      levothyroxine 88 MCG tablet  Commonly known as: SYNTHROID, LEVOTHROID   88 mcg, Daily      multivitamins-minerals capsule capsule   1 capsule, 2 Times Daily      nitroglycerin 0.4 MG SL tablet  Commonly known as: NITROSTAT   1 tablet      PROBIOTIC ADVANCED PO   1 capsule, Daily      rosuvastatin 5 MG tablet  Commonly known as: CRESTOR   5 mg      Safety Lancet 28G/Pressure Act misc   1 each, Not Applicable, As Needed      sertraline 50 MG tablet  Commonly known as: ZOLOFT   1 tablet, Nightly      valsartan-hydrochlorothiazide 320-25 MG per tablet  Commonly known as: DIOVAN-HCT   1 tablet, Daily             Stop These Medications      meclizine 25 MG tablet  Commonly known as: ANTIVERT              Discharge Diet:     Activity at Discharge:     Follow-up Appointments  Future Appointments   Date Time Provider Department Center   12/17/2024  1:00 PM [facility]     Additional Instructions for the Follow-ups that You Need to Schedule       Discharge Follow-up with PCP   As directed       Currently Documented PCP:    Marcus Field MD    PCP Phone Number:    [phone]     Follow Up Details: 5 to 7 days        Discharge Follow-up with Specified Provider: Cardiology   As directed      To: Cardiology   Follow Up Details: As scheduled                Test Results Pending at Discharge  Pending Results       Procedure [Order ID] Specimen - Date/Time    Adult Transthoracic Echo Complete W/ Cont if Necessary Per Protocol [[id number]] Resulted: 12/12/24 1257     Updated: 12/12/24 1533     LV GLOBAL STRAIN  -17.9 %      LVIDd 4.1 cm      LVIDs 2.6 cm      IVSd 1.20 cm      LVPWd 0.90 cm      FS 36.6 %      IVS/LVPW 1.33 cm      ESV(cubed) 17.6 ml      LV Sys Vol (BSA  corrected) 14.5 cm2      EDV(cubed) 68.9 ml      LV Hurley Vol (BSA corrected) 34.5 cm2      LV mass(C)d 141.5 grams      LVOT area 2.5 cm2      LVOT diam 1.80 cm      EDV(MOD-sp4) 60.6 ml      ESV(MOD-sp4) 25.4 ml      SV(MOD-sp4) 35.2 ml      SVi(MOD-SP4) 20.0 ml/m2      SVi (LVOT) 42.3 ml/m2      EF(MOD-sp4) 58.1 %      MV E max heri 68.7 cm/sec      MV A max heri 94.3 cm/sec      MV dec time 0.20 sec      MV E/A 0.73     Med Peak E' Heri 6.3 cm/sec      Lat Peak E' Heri 8.1 cm/sec      Avg E/e' ratio 9.54     SV(LVOT) 74.3 ml      RVIDd 2.00 cm      TAPSE (>1.6) 1.91 cm      RV S' 16.8 cm/sec      LA dimension (2D)  2.8 cm      LV V1 max 175.0 cm/sec      LV V1 max PG 12.3 mmHg      LV V1 mean PG 7.0 mmHg      LV V1 VTI 29.2 cm      Ao pk heri 175.0 cm/sec      Ao max PG 12.3 mmHg      Ao mean PG 7.0 mmHg      Ao V2 VTI 29.8 cm      MARCO A(I,D) 2.49 cm2      AI P1/2t 495.7 msec      MV max PG 6.9 mmHg      MV mean PG 3.0 mmHg      MV V2 VTI 26.3 cm      MV P1/2t 43.1 msec      MVA(P1/2t) 5.1 cm2      MVA(VTI) 2.8 cm2      MV dec slope 917.0 cm/sec2      RV V1 max PG 4.8 mmHg      RV V1 max 109.0 cm/sec      RV V1 VTI 20.9 cm      PA V2 max 132.0 cm/sec      PA acc time 0.08 sec      ACS 1.80 cm      Sinus 2.8 cm      STJ 2.20 cm      EF(MOD-bp) 58.0 %      Dimensionless Index 1.00 (DI)     BNP [[id number]]     Specimen: Blood     Cardiac Event Monitor (KASHIF) or Mobile Cardiac Outpatient Telemetry (MCT) [[id number]]     ECG 12 Lead Chest Pain [[id number]]              Risk for Readmission (LACE) Score: 2 (12/12/2024  6:00 AM)      Greater than 30 minutes spent in discharge activities for this patient    Signature:Electronically signed by Gera Jones PA-C, 12/12/24, 6:21 PM EST.

## 2024-12-12 NOTE — PLAN OF CARE
Problem: Adult Inpatient Plan of Care  Goal: Absence of Hospital-Acquired Illness or Injury  Intervention: Identify and Manage Fall Risk  Recent Flowsheet Documentation  Taken 12/12/2024 0200 by Tuyet Mon, RN  Safety Promotion/Fall Prevention: safety round/check completed  Taken 12/12/2024 0000 by Tuyet Mon, RN  Safety Promotion/Fall Prevention: safety round/check completed   Goal Outcome Evaluation:               Pt had no complaints through the night. Cardio consult and myoview scheduled for this AM. Npo since mn. No further orders at this time. Care continues.

## 2024-12-12 NOTE — PLAN OF CARE
Problem: Adult Inpatient Plan of Care  Goal: Plan of Care Review  Outcome: Progressing  Goal: Patient-Specific Goal (Individualized)  Outcome: Progressing  Goal: Absence of Hospital-Acquired Illness or Injury  Outcome: Progressing  Intervention: Identify and Manage Fall Risk  Recent Flowsheet Documentation  Taken 12/12/2024 1000 by Jake Kunz RN  Safety Promotion/Fall Prevention:   nonskid shoes/slippers when out of bed   toileting scheduled   safety round/check completed   clutter free environment maintained   assistive device/personal items within reach  Taken 12/12/2024 0801 by Jake Kunz RN  Safety Promotion/Fall Prevention:   nonskid shoes/slippers when out of bed   toileting scheduled   safety round/check completed   clutter free environment maintained   assistive device/personal items within reach  Intervention: Prevent Infection  Recent Flowsheet Documentation  Taken 12/12/2024 1000 by Jake Kunz RN  Infection Prevention: environmental surveillance performed  Taken 12/12/2024 0801 by Jake Kunz RN  Infection Prevention: environmental surveillance performed  Goal: Optimal Comfort and Wellbeing  Outcome: Progressing  Intervention: Provide Person-Centered Care  Recent Flowsheet Documentation  Taken 12/12/2024 0800 by Jake Kunz RN  Trust Relationship/Rapport:   care explained   questions answered  Goal: Readiness for Transition of Care  Outcome: Progressing     Problem: Comorbidity Management  Goal: Blood Pressure in Desired Range  Outcome: Progressing  Intervention: Maintain Blood Pressure Management  Recent Flowsheet Documentation  Taken 12/12/2024 1000 by Jake Kunz RN  Medication Review/Management: medications reviewed  Taken 12/12/2024 0801 by Jake Kunz RN  Medication Review/Management: medications reviewed     Problem: Chest Pain  Goal: Resolution of Chest Pain Symptoms  Outcome: Progressing   Goal Outcome Evaluation:

## 2024-12-12 NOTE — CONSULTS
Referring Provider: Jordy Ordaz MD    Reason for Consultation:      Chest pain  Palpitations  Dyspnea with exertion  GERD      Patient Care Team:  Marcus Field MD as PCP - General (Internal Medicine)  Kenji Farley MD as Consulting Physician (Otolaryngology)  Malu Shelley APRN as Nurse Practitioner (Endocrinology)      SUBJECTIVE     Chief Complaint: Chest discomfort    History of present illness:  Kathryn More is a 84 y.o. female with a history of hypertension who presented to [facility] with complaint of chest discomfort.    Patient is a 84-year-old female who does not have known coronary artery disease.  She presents with complaints of pain in the epigastric area and anterior chest wall.  She reports at times it feels like a spasm.  It does not occur with exertion.  Evaluation in the emergency room includes high-sensitivity troponin 20, 19, 24.  BUN and creatinine 25 and 1.0 D-dimer was 0.93 CBC was unremarkable.  Patient underwent CT PE protocol which showed no acute cardiopulmonary process.  There was no pulmonary embolism.  EKG on admission showed sinus rhythm with no acute ST or T wave segment abnormalities.    Review of previous cardiac records includes a cardiac catheterization from March 2021.  At that time the patient had normal epicardial coronary anatomy with a left dominant system with no angiographically significant stenosis noted in any of the arteries.  There was normal LVEDP.  Normal LV function.    Evaluation of noncardiac etiologies was recommended.    Follows routinely with Dr. Vogel in [place]    The patient reports that she has problems with acid reflux and previously had taken Prilosec but was advised to stop by her dermatologist due to eczema.  She has since been taking Pepcid.    The patient is known to have hypertension and dyslipidemia.  She is treated with losartan/HCTZ and Crestor.    Also reports periods of dyspnea with exertion and  palpitations.          Review of systems:    Review of Systems   Constitutional: Negative for chills and fever.   HENT:  Negative for ear discharge and nosebleeds.    Eyes:  Negative for discharge and redness.   Cardiovascular:  Positive for chest pain and palpitations. Negative for orthopnea, paroxysmal nocturnal dyspnea and syncope.   Respiratory:  Positive for shortness of breath. Negative for cough and wheezing.    Endocrine: Negative for heat intolerance.   Skin:  Negative for rash.   Musculoskeletal:  Negative for arthritis and myalgias.   Gastrointestinal:  Negative for abdominal pain, melena, nausea and vomiting.   Genitourinary:  Negative for dysuria and hematuria.   Neurological:  Negative for dizziness, light-headedness, numbness and tremors.   Psychiatric/Behavioral:  Negative for depression. The patient is not nervous/anxious.          Personal History:      Past Medical History:   Diagnosis Date    Arthritis     Disease of thyroid gland     parathyroid nodules    GERD (gastroesophageal reflux disease)     Hyperlipidemia     Hypertension     Lung nodules     Macular degeneration     of eyes    PONV (postoperative nausea and vomiting)     Skin cancer     Sleep apnea     uses c pap    Vertigo        Past Surgical History:   Procedure Laterality Date    BREAST SURGERY      Jonatan breast bx's  benign    CARDIAC CATHETERIZATION      CHOLECYSTECTOMY      DILATATION AND CURETTAGE      x2    EYE SURGERY      cataracts    SKIN CANCER EXCISION      Face    THYROIDECTOMY Bilateral 10/12/2021    Procedure: NECK EXPLORATION WITH LEFT PARATHYROID ADENOMA EXCISION AND FROZEN SECTION, INTRAOPERATIVE INTACT PTH ASSAY, RIGHT THYROIDECTOMY WITH FROZEN SECTION; LEFT PARTIAL THYROIDECTOMY; RECURRENT LARYNGEAL NERVE MONITORING;  Surgeon: Kenji Farley MD;  Location: [facility] MAIN OR;  Service: ENT;  Laterality: Bilateral;       Family History   Problem Relation Age of Onset    Stroke Mother     Heart disease Father         Social History     Tobacco Use    Smoking status: Never    Smokeless tobacco: Never   Vaping Use    Vaping status: Never Used   Substance Use Topics    Alcohol use: Not Currently     Alcohol/week: 2.0 standard drinks of alcohol     Types: 2 Shots of liquor per week    Drug use: Never        Home meds:  Prior to Admission medications    Medication Sig Start Date End Date Taking? Authorizing Provider   acetaminophen (TYLENOL) 500 MG tablet Take 2 tablets by mouth Every 6 (Six) Hours As Needed for Mild Pain.   Yes Luis Lucas MD   aspirin 81 MG chewable tablet Chew 1 tablet Daily. Last dose 10/04/21 per pt 10/20/21  Yes Kenji Farley MD   Dupilumab (Dupixent) 300 MG/2ML solution auto-injector injection Inject  under the skin into the appropriate area as directed Every 14 (Fourteen) Days.   Yes Luis Lucas MD   famotidine (PEPCID) 20 MG tablet Take 1 tablet by mouth 2 (Two) Times a Day.   Yes Luis Lucas MD   levothyroxine (SYNTHROID, LEVOTHROID) 88 MCG tablet Take 1 tablet by mouth Daily. 7/6/22  Yes Emergency, Nurse Epic, RN   meclizine (ANTIVERT) 25 MG tablet Take 1 tablet by mouth 3 (Three) Times a Day As Needed for Dizziness.   Yes Emergency, Nurse Epic, RN   multivitamins-minerals (PRESERVISION AREDS 2) capsule capsule Take 1 capsule by mouth 2 (Two) Times a Day.   Yes ProviderLuis MD   nitroglycerin (NITROSTAT) 0.4 MG SL tablet Place 1 tablet under the tongue.   Yes Luis Lucas MD   Probiotic Product (PROBIOTIC ADVANCED PO) Take 1 capsule by mouth Daily.   Yes Emergency, Nurse MONY Nunez   rosuvastatin (CRESTOR) 5 MG tablet Take 1 tablet by mouth. 1/25/22 12/11/25 Yes Emergency, Nurse MONY Nunez   sertraline (ZOLOFT) 50 MG tablet Take 1 tablet by mouth Every Night. 2/20/23  Yes Luis Lucas MD   valsartan-hydrochlorothiazide (DIOVAN-HCT) 320-25 MG per tablet Take 1 tablet by mouth Daily.   Yes Luis Lucas MD   Lancets (Safety Lancet  "28G/Pressure Act) misc 1 each As Needed (test BG as needed once daily). 11/16/22   Malu Shelley APRN       Allergies:     Biaxin [clarithromycin], Epinephrine, and Procaine    Scheduled Meds:aluminum-magnesium hydroxide-simethicone, 15 mL, Oral, Once  amLODIPine, 10 mg, Oral, Q24H  aspirin, 81 mg, Oral, Daily  famotidine, 20 mg, Oral, BID  valsartan, 320 mg, Oral, Nightly   And  hydroCHLOROthiazide, 25 mg, Oral, Nightly  isosorbide mononitrate, 30 mg, Oral, Q24H  levothyroxine, 88 mcg, Oral, Q AM  rosuvastatin, 5 mg, Oral, Nightly  senna-docusate sodium, 2 tablet, Oral, BID  sertraline, 50 mg, Oral, Nightly      Continuous Infusions:   PRN Meds:  acetaminophen    senna-docusate sodium **AND** polyethylene glycol **AND** bisacodyl **AND** bisacodyl    melatonin    Morphine **AND** naloxone    nitroglycerin    ondansetron    sodium chloride      OBJECTIVE    Vital Signs  Vitals:    12/12/24 1722 12/12/24 1723 12/12/24 1724 12/12/24 1725   BP:  123/63 121/67    BP Location:       Patient Position:  Sitting Standing    Pulse: 68 79 86 88   Resp:       Temp:       TempSrc:       SpO2: 95% 96% 93% 93%   Weight:       Height:           Flowsheet Rows      Flowsheet Row First Filed Value   Admission Height 157.5 cm (62\") Documented at 12/11/2024 1711   Admission Weight 78.4 kg (172 lb 13.5 oz) Documented at 12/11/2024 1711              Intake/Output Summary (Last 24 hours) at 12/12/2024 1809  Last data filed at 12/11/2024 2200  Gross per 24 hour   Intake 240 ml   Output --   Net 240 ml        Telemetry:  SR    Physical Exam:    Head: Atraumatic, normocephalic  Eyes: No conjunctival injection or redness noted.  No discharge  Neck: No JVD, no lymphadenopathy or carotid bruit  Chest: No obvious deformity noted  Lungs: Air entry is present in all lung zones.  Decreased breath sounds at the bases.  No crackles or rhonchi  Heart: Normal S1 and S2.  No pericardial rub or murmur  Neuro: Neurological exam was grossly " intact.  No focal deficit.  Psychiatric: Patient appears to be emotionally stable.  No depression or anxiety noted  Extremities: No leg edema noted          Results Review:  I have personally reviewed the results from the time of this admission to 12/12/2024 18:09 EST and agree with these findings:  []  Laboratory  []  Microbiology  []  Radiology  []  EKG/Telemetry   []  Cardiology/Vascular   []  Pathology  []  Old records  []  Other:    Most notable findings include:     Lab Results (last 24 hours)       Procedure Component Value Units Date/Time    Lipid Panel [[id number]]  (Abnormal) Collected: 12/12/24 0256    Specimen: Blood Updated: 12/12/24 0956     Total Cholesterol 148 mg/dL      Triglycerides 152 mg/dL      HDL Cholesterol 51 mg/dL      LDL Cholesterol  71 mg/dL      VLDL Cholesterol 26 mg/dL      LDL/HDL Ratio 1.31    Narrative:      Cholesterol Reference Ranges  (U.S. Department of Health and Human Services ATP III Classifications)    Desirable          <200 mg/dL  Borderline High    200-239 mg/dL  High Risk          >240 mg/dL      Triglyceride Reference Ranges  (U.S. Department of Health and Human Services ATP III Classifications)    Normal           <150 mg/dL  Borderline High  150-199 mg/dL  High             200-499 mg/dL  Very High        >500 mg/dL    HDL Reference Ranges  (U.S. Department of Health and Human Services ATP III Classifications)    Low     <40 mg/dl (major risk factor for CHD)  High    >60 mg/dl ('negative' risk factor for CHD)        LDL Reference Ranges  (U.S. Department of Health and Human Services ATP III Classifications)    Optimal          <100 mg/dL  Near Optimal     100-129 mg/dL  Borderline High  130-159 mg/dL  High             160-189 mg/dL  Very High        >189 mg/dL    High Sensitivity Troponin T [[id number]]  (Abnormal) Collected: 12/12/24 0256    Specimen: Blood Updated: 12/12/24 0521     HS Troponin T 24 ng/L     Narrative:      High Sensitive Troponin T Reference  Range:  <14.0 ng/L- Negative Female for AMI  <22.0 ng/L- Negative Male for AMI  >=14 - Abnormal Female indicating possible myocardial injury.  >=22 - Abnormal Male indicating possible myocardial injury.   Clinicians would have to utilize clinical acumen, EKG, Troponin, and serial changes to determine if it is an Acute Myocardial Infarction or myocardial injury due to an underlying chronic condition.         Basic Metabolic Panel [[id number]]  (Abnormal) Collected: 12/12/24 0256    Specimen: Blood Updated: 12/12/24 0521     Glucose 96 mg/dL      BUN 26 mg/dL      Creatinine 0.97 mg/dL      Sodium 137 mmol/L      Potassium 4.0 mmol/L      Chloride 98 mmol/L      CO2 27.4 mmol/L      Calcium 9.9 mg/dL      BUN/Creatinine Ratio 26.8     Anion Gap 11.6 mmol/L      eGFR 57.7 mL/min/1.73     Narrative:      GFR Categories in Chronic Kidney Disease (CKD)      GFR Category          GFR (mL/min/1.73)    Interpretation  G1                     90 or greater         Normal or high (1)  G2                      60-89                Mild decrease (1)  G3a                   45-59                Mild to moderate decrease  G3b                   30-44                Moderate to severe decrease  G4                    15-29                Severe decrease  G5                    14 or less           Kidney failure          (1)In the absence of evidence of kidney disease, neither GFR category G1 or G2 fulfill the criteria for CKD.    eGFR calculation 2021 CKD-EPI creatinine equation, which does not include race as a factor    CBC & Differential [[id number]]  (Abnormal) Collected: 12/12/24 0256    Specimen: Blood Updated: 12/12/24 0454    Narrative:      The following orders were created for panel order CBC & Differential.  Procedure                               Abnormality         Status                     ---------                               -----------         ------                     CBC Auto Differential[[id number]]        Abnormal             Final result                 Please view results for these tests on the individual orders.    CBC Auto Differential [[id number]]  (Abnormal) Collected: 12/12/24 0256    Specimen: Blood Updated: 12/12/24 0454     WBC 10.18 10*3/mm3      RBC 4.52 10*6/mm3      Hemoglobin 12.3 g/dL      Hematocrit 38.3 %      MCV 84.7 fL      MCH 27.2 pg      MCHC 32.1 g/dL      RDW 13.3 %      RDW-SD 41.2 fl      MPV 9.2 fL      Platelets 352 10*3/mm3      Neutrophil % 68.6 %      Lymphocyte % 17.6 %      Monocyte % 10.9 %      Eosinophil % 2.1 %      Basophil % 0.5 %      Immature Grans % 0.3 %      Neutrophils, Absolute 6.99 10*3/mm3      Lymphocytes, Absolute 1.79 10*3/mm3      Monocytes, Absolute 1.11 10*3/mm3      Eosinophils, Absolute 0.21 10*3/mm3      Basophils, Absolute 0.05 10*3/mm3      Immature Grans, Absolute 0.03 10*3/mm3      nRBC 0.0 /100 WBC     Respiratory Panel PCR w/COVID-19(SARS-CoV-2) KOURTNEY/ALIREZA/SHAWN/PAD/COR/MATTHEW In-House, NP Swab in UTM/VTM, 2 HR TAT - Swab, Nasopharynx [[id number]]  (Normal) Collected: 12/11/24 1819    Specimen: Swab from Nasopharynx Updated: 12/11/24 2002     ADENOVIRUS, PCR Not Detected     Coronavirus 229E Not Detected     Coronavirus HKU1 Not Detected     Coronavirus NL63 Not Detected     Coronavirus OC43 Not Detected     COVID19 Not Detected     Human Metapneumovirus Not Detected     Human Rhinovirus/Enterovirus Not Detected     Influenza A PCR Not Detected     Influenza B PCR Not Detected     Parainfluenza Virus 1 Not Detected     Parainfluenza Virus 2 Not Detected     Parainfluenza Virus 3 Not Detected     Parainfluenza Virus 4 Not Detected     RSV, PCR Not Detected     Bordetella pertussis pcr Not Detected     Bordetella parapertussis PCR Not Detected     Chlamydophila pneumoniae PCR Not Detected     Mycoplasma pneumo by PCR Not Detected    Narrative:      In the setting of a positive respiratory panel with a viral infection PLUS a negative procalcitonin without other underlying  concern for bacterial infection, consider observing off antibiotics or discontinuation of antibiotics and continue supportive care. If the respiratory panel is positive for atypical bacterial infection (Bordetella pertussis, Chlamydophila pneumoniae, or Mycoplasma pneumoniae), consider antibiotic de-escalation to target atypical bacterial infection.    High Sensitivity Troponin T 1Hr [[id number]]  (Abnormal) Collected: 12/11/24 1915    Specimen: Blood Updated: 12/11/24 1939     HS Troponin T 19 ng/L      Troponin T Delta -1 ng/L      Troponin T % Change -5 %     Narrative:      High Sensitive Troponin T Reference Range:  <14.0 ng/L- Negative Female for AMI  <22.0 ng/L- Negative Male for AMI  >=14 - Abnormal Female indicating possible myocardial injury.  >=22 - Abnormal Male indicating possible myocardial injury.   Clinicians would have to utilize clinical acumen, EKG, Troponin, and serial changes to determine if it is an Acute Myocardial Infarction or myocardial injury due to an underlying chronic condition.         Comprehensive Metabolic Panel [[id number]]  (Abnormal) Collected: 12/11/24 1819    Specimen: Blood Updated: 12/11/24 1848     Glucose 101 mg/dL      BUN 25 mg/dL      Creatinine 1.01 mg/dL      Sodium 138 mmol/L      Potassium 3.7 mmol/L      Chloride 96 mmol/L      CO2 29.7 mmol/L      Calcium 10.4 mg/dL      Total Protein 7.3 g/dL      Albumin 4.3 g/dL      ALT (SGPT) 16 U/L      AST (SGOT) 24 U/L      Alkaline Phosphatase 93 U/L      Total Bilirubin 0.2 mg/dL      Globulin 3.0 gm/dL      A/G Ratio 1.4 g/dL      BUN/Creatinine Ratio 24.8     Anion Gap 12.3 mmol/L      eGFR 55.0 mL/min/1.73     Narrative:      GFR Categories in Chronic Kidney Disease (CKD)      GFR Category          GFR (mL/min/1.73)    Interpretation  G1                     90 or greater         Normal or high (1)  G2                      60-89                Mild decrease (1)  G3a                   45-59                Mild to moderate  decrease  G3b                   30-44                Moderate to severe decrease  G4                    15-29                Severe decrease  G5                    14 or less           Kidney failure          (1)In the absence of evidence of kidney disease, neither GFR category G1 or G2 fulfill the criteria for CKD.    eGFR calculation 2021 CKD-EPI creatinine equation, which does not include race as a factor    High Sensitivity Troponin T [[id number]]  (Abnormal) Collected: 12/11/24 1819    Specimen: Blood Updated: 12/11/24 1848     HS Troponin T 20 ng/L     Narrative:      High Sensitive Troponin T Reference Range:  <14.0 ng/L- Negative Female for AMI  <22.0 ng/L- Negative Male for AMI  >=14 - Abnormal Female indicating possible myocardial injury.  >=22 - Abnormal Male indicating possible myocardial injury.   Clinicians would have to utilize clinical acumen, EKG, Troponin, and serial changes to determine if it is an Acute Myocardial Infarction or myocardial injury due to an underlying chronic condition.         D-dimer, Quantitative [[id number]]  (Abnormal) Collected: 12/11/24 1819    Specimen: Blood Updated: 12/11/24 1844     D-Dimer, Quantitative 0.93 MCGFEU/mL     Narrative:      According to the assay 's published package insert, a normal (<0.50 MCGFEU/mL) D-dimer result in conjunction with a non-high clinical probability assessment, excludes deep vein thrombosis (DVT) and pulmonary embolism (PE) with high sensitivity.    D-dimer values increase with age and this can make VTE exclusion of an older population difficult. To address this, the [employer], based on best available evidence and recent guidelines, recommends that clinicians use age-adjusted D-dimer thresholds in patients greater than 50 years of age with: a) a low probability of PE who do not meet all Pulmonary Embolism Rule Out Criteria, or b) in those with intermediate probability of PE.   The formula for an age-adjusted  "D-dimer cut-off is \"age/100\".  For example, a 60 year old patient would have an age-adjusted cut-off of 0.60 MCGFEU/mL and an 80 year old 0.80 MCGFEU/mL.    [facility] Draw [[id number]] Collected: 12/11/24 1819    Specimen: Blood Updated: 12/11/24 1832    Narrative:      The following orders were created for panel order Le[facility] Draw.  Procedure                               Abnormality         Status                     ---------                               -----------         ------                     Green Top (Gel)[[id number]]                                  Final result               Lavender Top[[id number]]                                     Final result               Gold Top - SST[[id number]]                                   Final result               Light Blue Top[[id number]]                                   Final result                 Please view results for these tests on the individual orders.    Green Top (Gel) [[id number]] Collected: 12/11/24 1819    Specimen: Blood Updated: 12/11/24 1832     Extra Tube Hold for add-ons.     Comment: Auto resulted.       Lavender Top [[id number]] Collected: 12/11/24 1819    Specimen: Blood Updated: 12/11/24 1832     Extra Tube hold for add-on     Comment: Auto resulted       Gold Top - SST [[id number]] Collected: 12/11/24 1819    Specimen: Blood Updated: 12/11/24 1832     Extra Tube Hold for add-ons.     Comment: Auto resulted.       Light Blue Top [[id number]] Collected: 12/11/24 1819    Specimen: Blood Updated: 12/11/24 1832     Extra Tube Hold for add-ons.     Comment: Auto resulted       CBC & Differential [[id number]]  (Abnormal) Collected: 12/11/24 1819    Specimen: Blood Updated: 12/11/24 1829    Narrative:      The following orders were created for panel order CBC & Differential.  Procedure                               Abnormality         Status                     ---------                               -----------         ------                     CBC Auto " Differential[[id number]]        Abnormal            Final result                 Please view results for these tests on the individual orders.    CBC Auto Differential [[id number]]  (Abnormal) Collected: 12/11/24 1819    Specimen: Blood Updated: 12/11/24 1829     WBC 9.87 10*3/mm3      RBC 4.68 10*6/mm3      Hemoglobin 12.9 g/dL      Hematocrit 39.4 %      MCV 84.2 fL      MCH 27.6 pg      MCHC 32.7 g/dL      RDW 13.1 %      RDW-SD 40.4 fl      MPV 9.1 fL      Platelets 350 10*3/mm3      Neutrophil % 74.9 %      Lymphocyte % 14.9 %      Monocyte % 8.2 %      Eosinophil % 1.1 %      Basophil % 0.4 %      Immature Grans % 0.5 %      Neutrophils, Absolute 7.39 10*3/mm3      Lymphocytes, Absolute 1.47 10*3/mm3      Monocytes, Absolute 0.81 10*3/mm3      Eosinophils, Absolute 0.11 10*3/mm3      Basophils, Absolute 0.04 10*3/mm3      Immature Grans, Absolute 0.05 10*3/mm3      nRBC 0.0 /100 WBC             Imaging Results (Last 24 Hours)       Procedure Component Value Units Date/Time    CT Angiogram Chest Pulmonary Embolism [[id number]] Collected: 12/11/24 2015     Updated: 12/11/24 2021    Narrative:      CT ANGIOGRAM CHEST PULMONARY EMBOLISM    Date of Exam: 12/11/2024 7:50 PM EST    Indication: pain, soa.    Comparison: None available.    Technique: Axial CT images were obtained of the chest after the uneventful intravenous administration of iodinated contrast utilizing pulmonary embolism protocol.  Sagittal and coronal reconstructions were performed.  Automated exposure control and   iterative reconstruction methods were used.    Findings: No consolidation. No pneumothorax or pleural effusion. The heart and pericardium are normal. Prior partial thyroidectomy. No evidence of aortic dissection. No pulmonary embolus. Calcified mediastinal lymph nodes consistent with prior   granulomatous disease. No consolidation. Nonspecific rounded pleural-based density in the left lower lobe measuring 1.9 cm x 1 cm.    Previous  cholecystectomy. Otherwise the visualized upper abdomen is normal.    Thoracic vertebral body height and alignment are normal. The intervertebral disc spaces are maintained. The sternum is intact. No rib fractures.      Impression:      No acute cardiopulmonary disease. No pulmonary embolus.      Electronically Signed: Palomo Wilkinson MD    12/11/2024 8:19 PM EST    Workstation ID: [id number]    XR Chest 1 View [[id number]] Collected: 12/11/24 1930     Updated: 12/11/24 1933    Narrative:      XR CHEST 1 VW    Date of Exam: 12/11/2024 6:31 PM EST    Indication: Chest Pain Protocol  Chest Pain Protocol    Comparison: 11/26/2024    Findings:  Cardiomediastinal silhouette is unremarkable.  No airspace disease, pneumothorax, nor pleural effusion. No acute osseous abnormality identified.      Impression:      Impression:  No acute process identified      Electronically Signed: Gio Cordero MD    12/11/2024 7:31 PM EST    Workstation ID: [id number]            LAB RESULTS (LAST 7 DAYS)    CBC  Results from last 7 days   Lab Units 12/12/24  0256 12/11/24  1819   WBC 10*3/mm3 10.18 9.87   RBC 10*6/mm3 4.52 4.68   HEMOGLOBIN g/dL 12.3 12.9   HEMATOCRIT % 38.3 39.4   MCV fL 84.7 84.2   PLATELETS 10*3/mm3 352 350       BMP  Results from last 7 days   Lab Units 12/12/24  0256 12/11/24  1819   SODIUM mmol/L 137 138   POTASSIUM mmol/L 4.0 3.7   CHLORIDE mmol/L 98 96*   CO2 mmol/L 27.4 29.7*   BUN mg/dL 26* 25*   CREATININE mg/dL 0.97 1.01*   GLUCOSE mg/dL 96 101*       CMP   Results from last 7 days   Lab Units 12/12/24  0256 12/11/24  1819   SODIUM mmol/L 137 138   POTASSIUM mmol/L 4.0 3.7   CHLORIDE mmol/L 98 96*   CO2 mmol/L 27.4 29.7*   BUN mg/dL 26* 25*   CREATININE mg/dL 0.97 1.01*   GLUCOSE mg/dL 96 101*   ALBUMIN g/dL  --  4.3   BILIRUBIN mg/dL  --  0.2   ALK PHOS U/L  --  93   AST (SGOT) U/L  --  24   ALT (SGPT) U/L  --  16       BNP        TROPONIN  Results from last 7 days   Lab Units 12/12/24  0256   HSTROP T ng/L 24*        CoAg        Creatinine Clearance  Estimated Creatinine Clearance: 40.8 mL/min (by C-G formula based on SCr of 0.97 mg/dL).    ABG          Radiology  CT Angiogram Chest Pulmonary Embolism    Result Date: 12/11/2024  No acute cardiopulmonary disease. No pulmonary embolus. Electronically Signed: Palomo Wilkinson MD  12/11/2024 8:19 PM EST  Workstation ID: [id number]    XR Chest 1 View    Result Date: 12/11/2024  Impression: No acute process identified Electronically Signed: Gio Cordero MD  12/11/2024 7:31 PM EST  Workstation ID: [id number]       EKG  I personally viewed and interpreted the patient's EKG/Telemetry data:  ECG 12 Lead Chest Pain   Final Result   HEART RATE=81  bpm   RR Qmayqxzu=390  ms   FL Wnzikntm=004  ms   P Horizontal Axis=-35  deg   P Front Axis=42  deg   QRSD Interval=85  ms   QT Tgaaklul=150  ms   HIwX=611  ms   QRS Axis=115  deg   T Wave Axis=-5  deg   - OTHERWISE NORMAL ECG -   Sinus rhythm   Right axis deviation   Electronically Signed By: Jordy Ordaz (Domenic) 2024-12-12 07:30:43   Date and Time of Study:2024-12-11 17:20:13      Telemetry Scan   Final Result      Telemetry Scan   Final Result      ECG 12 Lead Chest Pain    (Results Pending)               Echocardiogram:          Stress Test:  Results for orders placed during the hospital encounter of 12/11/24    Stress Test With Myocardial Perfusion One Day    Interpretation Summary    Myocardial perfusion imaging indicates a normal myocardial perfusion study with no evidence of ischemia. Impressions are consistent with a low risk study.    Left ventricular ejection fraction is hyperdynamic (Calculated EF > 70%).    GI artifact is present.    This is normal Cardiolite imaging stress test with no evidence of ischemia or myocardial infarction.  Small fixed inferior defect noted which showed normal thickening and contractibility consistent with attenuation artifact left ventricle size and function is normal on gated SPECT imaging.  No wall motion  abnormality was noted.  Clinical correlation recommended.  Further recommendation as per ordering physician. .    Findings consistent with a normal ECG stress test.        Cardiac Catheterization:  No results found for this or any previous visit.  RESULTS OF PROCEDURE:    A) HEMODYNAMICS: The opening aortic pressure was 106/49 with a mean of 72        mmHg.  The left ventricular end-diastolic pressure was 14 mmHg.  There        was no gradient across the aortic valve on pullback.  The closing aortic        pressure was 127/58 with a mean of 87 mmHg.         B) CORONARY ANATOMY:    1.  The left main coronary artery is a short segment, which is free of any        stenosis.    2.  Left circumflex artery is a very large dominant vessel giving rise to        various obtuse marginal branches and the left posterior descending        artery.  The entire left circumflex artery had branches free of any        stenosis.    3.  Left anterior descending artery is a large vessel, giving rise to various        diagonal and septal  branches.  Some luminal irregularities are        noted in the proximal left anterior descending artery but no        angiographically significant disease noted.    4.  The right coronary artery is a small nondominant vessel, which is free of        any stenosis.         CUMULATIVE FLUOROSCOPY TIME:    2.8 minutes.         CUMULATIVE AIR KERMA:    484.65 mGy.         TOTAL AMOUNT OF CONTRAST USED:    37 mL of Isovue.         COMPLICATIONS:    None.         ESTIMATED BLOOD LOSS:    Less than 5 mL.         IMPRESSION:    1.  Normal epicardial coronary anatomy with a left dominant system and no        angiographically significant stenosis noted in any of the arteries.    2.  Normal left ventricular end-diastolic pressure.    3.  No culprit lesions are identified for the patient's presentation with        angina, indicating that the symptoms are non-cardiac in origin.         RECOMMENDATIONS:    1.   Continue aggressive risk factor modification.    2.  If symptoms persist, non-cardiac etiology needs to be pursued.             Other:      ASSESSMENT & PLAN:    Principal Problem:    Chest pain    Chest pain  Somewhat atypical for angina  EKG without acute ST or T wave segment abnormalities  High-sensitivity troponin x 3:20, 19, 24  Normal cardiac catheterization in Mar.bhesig   2021 with normal epicardial anatomy with no significant stenosis noted.  Recommended evaluation for noncardiac etiologies of chest pain  Would consider coronary CTA as outpatient   Would recommend GI consult patient had to previously stop Prilosec due to eczema per her dermatologist and she is having ongoing issues with reflux    Hypertension  Blood pressure is not ideally controlled  Will add low-dose amlodipine  Patient reports she had previous intolerance to beta-blocker    Dyspnea with exertion  Patient appears euvolemic with no evidence of volume overload on CT or chest x-ray  Echocardiogram will be repeated to reassess LV function    Palpitations  Will observe telemetry monitor for arrhythmias  currently in sinus rhythm  Consider M cot monitor at discharge      Proceed with echocardiogram  Will ask GI to evaluate patient  Add low-dose amlodipine for blood pressure  Check lipid panel  Consider M cot at discharge  Consider coronary CTA as outpatient    Additional recommendations per Dr. Saravia      Patient is seen and examined and findings are verified.  All data is reviewed by me personally.  Assessment and plan formulated by APC was done after discussion with attending.  I spent more than 50% of time in taking care of the patient.    Patient is presented with chest pain which is atypical.  Troponins are negative.    Hemodynamics are stable    Normal S1 and S2.  No pericardial rub or murmur abdominal exam is benign.    Her blood pressure is elevated.  I would recommend aggressive blood pressure control.  I would add amlodipine.   Patient is intolerant to beta-blocker.  I would also recommend to have Imdur.  However patient is very reluctant to take Imdur because of the headache.  I would recommend that patient should proceed with stress test.  Patient underwent stress test which was negative for ischemia.  Echocardiogram is unremarkable.  I would recommend other causes of chest pain should be entertained especially GI.  Patient is recommended to have GI consultation.  Patient can be discharged from cardiac standpoint.  Consider MCOT on discharge.    Patient has Dr. goldman as her cardiologist patient can follow-up with him.      Rufus Saravia MD  12/12/24  18:09 EST

## 2024-12-13 ENCOUNTER — TELEPHONE (OUTPATIENT)
Dept: CARDIOLOGY | Facility: CLINIC | Age: 84
End: 2024-12-13
Payer: MEDICARE

## 2024-12-13 NOTE — CASE MANAGEMENT/SOCIAL WORK
Case Management Discharge Note      Final Note: Routine home         Selected Continued Care - Discharged on 12/12/2024 Admission date: 12/11/2024 - Discharge disposition: Home or Self Care         Transportation Services  Private: Car    Final Discharge Disposition Code: 01 - home or self-care

## 2024-12-13 NOTE — TELEPHONE ENCOUNTER
Left message for callback.    From Latosha Goss:    Please call pt.   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   She should f/u with her primary cardiologist to discuss   If she would like to switch to a cardiologist in the [facility] system Dr. Saravia would be happy to see.   Please also fax echo report to dr. Hughes office with a note that pt was advissed to f/u with him about her echo

## 2024-12-13 NOTE — PROGRESS NOTES
Please call pt.   Her echo was finalized after she left  There is some progression of her aortic valve leakage.   She should f/u with her primary cardiologist to discuss  If she would like to switch to a cardiologist in the [facility] system Dr. Saravia would be happy to see.   Please also fax echo report to dr. Hughes office with a note that pt was advissed to f/u with him about her echo

## 2025-01-02 ENCOUNTER — HOSPITAL ENCOUNTER (OUTPATIENT)
Dept: MAMMOGRAPHY | Facility: HOSPITAL | Age: 85
Discharge: HOME | End: 2025-01-02
Admitting: INTERNAL MEDICINE
Payer: MEDICARE

## 2025-01-02 DIAGNOSIS — Z12.31 BREAST CANCER SCREENING BY MAMMOGRAM: ICD-10-CM

## 2025-01-02 PROCEDURE — 77067 SCR MAMMO BI INCL CAD: CPT

## 2025-01-02 PROCEDURE — 77063 BREAST TOMOSYNTHESIS BI: CPT

## 2025-01-28 ENCOUNTER — HOSPITAL ENCOUNTER (OUTPATIENT)
Facility: HOSPITAL | Age: 85
Discharge: HOME | End: 2025-01-28
Attending: EMERGENCY MEDICINE | Admitting: EMERGENCY MEDICINE
Payer: MEDICARE

## 2025-01-28 ENCOUNTER — APPOINTMENT (OUTPATIENT)
Dept: GENERAL RADIOLOGY | Facility: HOSPITAL | Age: 85
End: 2025-01-28
Payer: MEDICARE

## 2025-01-28 VITALS
WEIGHT: 171.1 LBS | SYSTOLIC BLOOD PRESSURE: 141 MMHG | RESPIRATION RATE: 16 BRPM | OXYGEN SATURATION: 96 % | TEMPERATURE: 98.7 F | HEART RATE: 98 BPM | BODY MASS INDEX: 30.32 KG/M2 | HEIGHT: 63 IN | DIASTOLIC BLOOD PRESSURE: 86 MMHG

## 2025-01-28 DIAGNOSIS — B96.89 ACUTE BACTERIAL BRONCHITIS: ICD-10-CM

## 2025-01-28 DIAGNOSIS — J20.8 ACUTE BACTERIAL BRONCHITIS: ICD-10-CM

## 2025-01-28 DIAGNOSIS — J21.0 RSV (ACUTE BRONCHIOLITIS DUE TO RESPIRATORY SYNCYTIAL VIRUS): Primary | ICD-10-CM

## 2025-01-28 LAB
FLUAV SUBTYP SPEC NAA+PROBE: NOT DETECTED
FLUBV RNA ISLT QL NAA+PROBE: NOT DETECTED
SARS-COV-2 RNA RESP QL NAA+PROBE: NOT DETECTED

## 2025-01-28 PROCEDURE — 87636 SARSCOV2 & INF A&B AMP PRB: CPT | Performed by: EMERGENCY MEDICINE

## 2025-01-28 PROCEDURE — G0463 HOSPITAL OUTPT CLINIC VISIT: HCPCS | Performed by: EMERGENCY MEDICINE

## 2025-01-28 PROCEDURE — 63710000001 PREDNISONE PER 1 MG: Performed by: EMERGENCY MEDICINE

## 2025-01-28 PROCEDURE — 71046 X-RAY EXAM CHEST 2 VIEWS: CPT

## 2025-01-28 RX ORDER — ALBUTEROL SULFATE 0.83 MG/ML
2.5 SOLUTION RESPIRATORY (INHALATION) ONCE
Status: COMPLETED | OUTPATIENT
Start: 2025-01-28 | End: 2025-01-28

## 2025-01-28 RX ORDER — PREDNISONE 20 MG/1
20 TABLET ORAL ONCE
Status: COMPLETED | OUTPATIENT
Start: 2025-01-28 | End: 2025-01-28

## 2025-01-28 RX ORDER — PREDNISONE 20 MG/1
20 TABLET ORAL 2 TIMES DAILY
Qty: 10 TABLET | Refills: 0 | Status: SHIPPED | OUTPATIENT
Start: 2025-01-28 | End: 2025-02-02

## 2025-01-28 RX ORDER — CEFDINIR 300 MG/1
300 CAPSULE ORAL 2 TIMES DAILY
Qty: 20 CAPSULE | Refills: 0 | Status: SHIPPED | OUTPATIENT
Start: 2025-01-28 | End: 2025-02-07

## 2025-01-28 RX ORDER — ALBUTEROL SULFATE 0.83 MG/ML
2.5 SOLUTION RESPIRATORY (INHALATION) EVERY 4 HOURS PRN
Qty: 25 EACH | Refills: 0 | Status: SHIPPED | OUTPATIENT
Start: 2025-01-28

## 2025-01-28 RX ADMIN — PREDNISONE 20 MG: 20 TABLET ORAL at 20:05

## 2025-01-28 RX ADMIN — ALBUTEROL SULFATE 2.5 MG: 2.5 SOLUTION RESPIRATORY (INHALATION) at 21:14

## 2025-01-29 NOTE — DISCHARGE INSTRUCTIONS
Please be advised chest x-ray shows no pneumonia however suspect possible bacterial bronchitis.  Please take cefdinir as prescribed.  Take prednisone twice a day for 5 days as prescribed.  Please start this tomorrow as you were given a first dose this evening.  Take cefdinir twice a day and start this tonight.  Use albuterol nebulizer every 4-6 hours as needed for difficulty breathing.  Follow-up with your provider.  Seek immediate medical attention anytime if having any concerns.

## 2025-01-29 NOTE — FSED PROVIDER NOTE
Subjective   History of Present Illness    Patient 84-year-old woman who had recently been diagnosed with RSV and admitted to [facility].  This was approximately 2 weeks ago.  She was released 1 week ago.  Patient presents here with cough and congestion but no fevers or vomiting or diarrhea.  The patient was sent here by her primary provider for chest x-ray for evaluation of possible pneumonia.  Patient had used her grandson's nebulizer machine which had helped with her cough symptoms.    Review of Systems    Past Medical History:   Diagnosis Date    Arthritis     Disease of thyroid gland     parathyroid nodules    GERD (gastroesophageal reflux disease)     Hyperlipidemia     Hypertension     Lung nodules     Macular degeneration     of eyes    PONV (postoperative nausea and vomiting)     Skin cancer     Sleep apnea     uses c pap    Vertigo        Allergies   Allergen Reactions    Biaxin [Clarithromycin] GI Intolerance     Terrible stomach pain    Epinephrine Unknown - High Severity     syncope    Procaine Unknown - High Severity       Past Surgical History:   Procedure Laterality Date    BREAST SURGERY      Jonatan breast bx's  benign    CARDIAC CATHETERIZATION      CHOLECYSTECTOMY      DILATATION AND CURETTAGE      x2    EYE SURGERY      cataracts    SKIN CANCER EXCISION      Face    THYROIDECTOMY Bilateral 10/12/2021    Procedure: NECK EXPLORATION WITH LEFT PARATHYROID ADENOMA EXCISION AND FROZEN SECTION, INTRAOPERATIVE INTACT PTH ASSAY, RIGHT THYROIDECTOMY WITH FROZEN SECTION; LEFT PARTIAL THYROIDECTOMY; RECURRENT LARYNGEAL NERVE MONITORING;  Surgeon: Kenji Farley MD;  Location: [facility] MAIN OR;  Service: ENT;  Laterality: Bilateral;       Family History   Problem Relation Age of Onset    Stroke Mother     Heart disease Father        Social History     Socioeconomic History    Marital status:     Number of children: 1   Tobacco Use    Smoking status: Never    Smokeless tobacco: Never   Vaping Use     Vaping status: Never Used   Substance and Sexual Activity    Alcohol use: Not Currently     Alcohol/week: 2.0 standard drinks of alcohol     Types: 2 Shots of liquor per week    Drug use: Never    Sexual activity: Not Currently           Objective   Physical Exam  Vitals and nursing note reviewed.   Constitutional:       General: She is not in acute distress.     Appearance: Normal appearance. She is not ill-appearing or toxic-appearing.      Comments: Patient resting company on [facility].  O2 sat at 98% on room air.   HENT:      Head: Normocephalic and atraumatic.      Nose: Rhinorrhea present.      Mouth/Throat:      Mouth: Mucous membranes are moist.      Pharynx: Oropharynx is clear.   Eyes:      Extraocular Movements: Extraocular movements intact.      Conjunctiva/sclera: Conjunctivae normal.   Cardiovascular:      Rate and Rhythm: Normal rate and regular rhythm.      Heart sounds: Normal heart sounds.   Pulmonary:      Effort: Pulmonary effort is normal. No respiratory distress.      Breath sounds: Normal breath sounds. No stridor.   Abdominal:      Palpations: Abdomen is soft.      Tenderness: There is no abdominal tenderness.   Musculoskeletal:         General: No swelling or tenderness. Normal range of motion.      Cervical back: Normal range of motion and neck supple.   Skin:     General: Skin is warm and dry.      Capillary Refill: Capillary refill takes less than 2 seconds.   Neurological:      General: No focal deficit present.      Mental Status: She is alert.         Procedures           ED Course                                           Medical Decision Making    Patient 84-year-old woman who had recently been diagnosed with RSV and admitted at [facility] for approximately 4 days.  She was discharged approxi-1 week ago.  Patient has persistent cough symptoms and is concerned she may have a pneumonia.  Has had no vomiting or diarrhea.  She has used her grandson's nebulizer machine with relief of  her symptoms.  On examination she appears to be in no distress and nontoxic-appearing.  Lungs are clear to auscultation.  She does occasionally have coughing spells but O2 sats have remained above 93% and mostly at 98%.  Chest x-ray was obtained which shows no acute cardiopulmonary findings however the patient may have early bacterial bronchitis or pneumonia and the patient will be placed on cefdinir.  Patient also be placed on prednisone.  COVID and flu test were obtained which were negative.  Patient does have access to a nebulizer machine and prescription for albuterol nebs provided.    Final diagnoses:   RSV (acute bronchiolitis due to respiratory syncytial virus)   Acute bacterial bronchitis       ED Disposition  ED Disposition       ED Disposition   Discharge    Condition   Stable    Comment   --               Marcus Field MD  [address] [place] [address] [place] [address] [phone]    In 3 days           Medication List        New Prescriptions      albuterol (2.5 MG/3ML) 0.083% nebulizer solution  Commonly known as: PROVENTIL  Take 2.5 mg by nebulization Every 4 (Four) Hours As Needed for Wheezing or Shortness of Air.     cefdinir 300 MG capsule  Commonly known as: OMNICEF  Take 1 capsule by mouth 2 (Two) Times a Day for 10 days.     predniSONE 20 MG tablet  Commonly known as: DELTASONE  Take 1 tablet by mouth 2 (Two) Times a Day for 5 days.               Where to Get Your Medications        These medications were sent to [facility]/pharmacy #[id number] - [place], [place] - [address] [place] [address] - [phone]  - [phone] [address] [place] [address]      Hours: 24-hours Phone: [phone]   albuterol (2.5 MG/3ML) 0.083% nebulizer solution  cefdinir 300 MG capsule  predniSONE 20 MG tablet

## 2025-07-30 ENCOUNTER — LAB (OUTPATIENT)
Dept: LAB | Facility: HOSPITAL | Age: 85
End: 2025-07-30
Payer: MEDICARE

## 2025-07-30 ENCOUNTER — TRANSCRIBE ORDERS (OUTPATIENT)
Dept: ADMINISTRATIVE | Facility: HOSPITAL | Age: 85
End: 2025-07-30
Payer: MEDICARE

## 2025-07-30 DIAGNOSIS — I12.9 HYPERTENSIVE NEPHROPATHY: ICD-10-CM

## 2025-07-30 DIAGNOSIS — E87.1 HYPOSMOLALITY SYNDROME: ICD-10-CM

## 2025-07-30 DIAGNOSIS — N18.2 CHRONIC KIDNEY DISEASE, STAGE II (MILD): ICD-10-CM

## 2025-07-30 DIAGNOSIS — N18.2 CHRONIC KIDNEY DISEASE, STAGE II (MILD): Primary | ICD-10-CM

## 2025-07-30 DIAGNOSIS — R60.0 LOCALIZED EDEMA: ICD-10-CM

## 2025-07-30 LAB
ALBUMIN SERPL-MCNC: 4.5 G/DL (ref 3.5–5.2)
ANION GAP SERPL CALCULATED.3IONS-SCNC: 15.5 MMOL/L (ref 5–15)
BACTERIA UR QL AUTO: NORMAL /HPF
BILIRUB UR QL STRIP: NEGATIVE
BUN SERPL-MCNC: 26.6 MG/DL (ref 8–23)
BUN/CREAT SERPL: 25.3 (ref 7–25)
CALCIUM SPEC-SCNC: 9.9 MG/DL (ref 8.6–10.5)
CHLORIDE SERPL-SCNC: 90 MMOL/L (ref 98–107)
CLARITY UR: CLEAR
CO2 SERPL-SCNC: 27.5 MMOL/L (ref 22–29)
COLOR UR: YELLOW
CREAT SERPL-MCNC: 1.05 MG/DL (ref 0.57–1)
DEPRECATED RDW RBC AUTO: 42.7 FL (ref 37–54)
EGFRCR SERPLBLD CKD-EPI 2021: 52.2 ML/MIN/1.73
ERYTHROCYTE [DISTWIDTH] IN BLOOD BY AUTOMATED COUNT: 14.2 % (ref 12.3–15.4)
GLUCOSE SERPL-MCNC: 137 MG/DL (ref 65–99)
GLUCOSE UR STRIP-MCNC: NEGATIVE MG/DL
HCT VFR BLD AUTO: 40 % (ref 34–46.6)
HGB BLD-MCNC: 13.2 G/DL (ref 12–15.9)
HGB UR QL STRIP.AUTO: NEGATIVE
HYALINE CASTS UR QL AUTO: NORMAL /LPF
KETONES UR QL STRIP: NEGATIVE
LEUKOCYTE ESTERASE UR QL STRIP.AUTO: ABNORMAL
MCH RBC QN AUTO: 27.3 PG (ref 26.6–33)
MCHC RBC AUTO-ENTMCNC: 33 G/DL (ref 31.5–35.7)
MCV RBC AUTO: 82.6 FL (ref 79–97)
NITRITE UR QL STRIP: NEGATIVE
PH UR STRIP.AUTO: 7 [PH] (ref 5–8)
PHOSPHATE SERPL-MCNC: 4.2 MG/DL (ref 2.5–4.5)
PLATELET # BLD AUTO: 305 10*3/MM3 (ref 140–450)
PMV BLD AUTO: 9.6 FL (ref 6–12)
POTASSIUM SERPL-SCNC: 4.5 MMOL/L (ref 3.5–5.2)
PROT UR QL STRIP: NEGATIVE
RBC # BLD AUTO: 4.84 10*6/MM3 (ref 3.77–5.28)
RBC # UR STRIP: NORMAL /HPF
REF LAB TEST METHOD: NORMAL
SODIUM SERPL-SCNC: 133 MMOL/L (ref 136–145)
SP GR UR STRIP: 1.01 (ref 1–1.03)
SQUAMOUS #/AREA URNS HPF: NORMAL /HPF
UROBILINOGEN UR QL STRIP: ABNORMAL
WBC # UR STRIP: NORMAL /HPF
WBC NRBC COR # BLD AUTO: 8.58 10*3/MM3 (ref 3.4–10.8)

## 2025-07-30 PROCEDURE — 81001 URINALYSIS AUTO W/SCOPE: CPT

## 2025-07-30 PROCEDURE — 85027 COMPLETE CBC AUTOMATED: CPT

## 2025-07-30 PROCEDURE — 36415 COLL VENOUS BLD VENIPUNCTURE: CPT

## 2025-07-30 PROCEDURE — 80069 RENAL FUNCTION PANEL: CPT

## (undated) DEVICE — DEV OPN LIGASURE DISSCT EXACT 40DEG 21.6MM BX/1

## (undated) DEVICE — PENCL E/S SMOKEEVAC W/TELESCP CANN

## (undated) DEVICE — PROBE 8225825 3PK INCREMT STD PRASS ROHS

## (undated) DEVICE — VAGINAL PREP TRAY: Brand: MEDLINE INDUSTRIES, INC.

## (undated) DEVICE — ENT-LF: Brand: MEDLINE INDUSTRIES, INC.

## (undated) DEVICE — SUT VIC COAT 5/0 P3 18IN

## (undated) DEVICE — Device

## (undated) DEVICE — SLV SCD LEG COMFORT KENDALLSCD MD REPROC

## (undated) DEVICE — DRSNG SURESITE WNDW 4X4.5

## (undated) DEVICE — RETR STAY ELAS SLD/BLD 6.5X16MM PK/4

## (undated) DEVICE — SUT PROLN 6/0 P1 18IN 8697G

## (undated) DEVICE — RETR RNG GEN2 18.6X8.9CM

## (undated) DEVICE — ROUND DISSECTORS: Brand: DEROYAL

## (undated) DEVICE — EMG TUBE 8229707 NIM TRIVANTAGE 7.0MM ID: Brand: NIM TRIVANTAGE™

## (undated) DEVICE — GLV SURG BIOGEL LTX PF 7 1/2

## (undated) DEVICE — KT MINI/BULB HEMOVAC INFCT/CONTRL END/PERFTROC RND 1/8IN

## (undated) DEVICE — INTENDED FOR TISSUE SEPARATION, AND OTHER PROCEDURES THAT REQUIRE A SHARP SURGICAL BLADE TO PUNCTURE OR CUT.: Brand: BARD-PARKER ® CARBON RIB-BACK BLADES

## (undated) DEVICE — SUT VIC PLS UD BR COAT 4/0 PS4 18IN